# Patient Record
Sex: FEMALE | Race: WHITE | NOT HISPANIC OR LATINO | Employment: FULL TIME | URBAN - METROPOLITAN AREA
[De-identification: names, ages, dates, MRNs, and addresses within clinical notes are randomized per-mention and may not be internally consistent; named-entity substitution may affect disease eponyms.]

---

## 2017-01-03 DIAGNOSIS — N95.1 PERIMENOPAUSAL: ICD-10-CM

## 2017-01-03 DIAGNOSIS — B37.31 CANDIDIASIS OF VULVA AND VAGINA: ICD-10-CM

## 2017-01-03 DIAGNOSIS — N90.89 IRRITATION OF VULVA: ICD-10-CM

## 2017-01-04 RX ORDER — CONJUGATED ESTROGENS 0.62 MG/G
CREAM VAGINAL
Qty: 30 G | Refills: 0 | Status: SHIPPED | OUTPATIENT
Start: 2017-01-04 | End: 2017-08-06 | Stop reason: SDUPTHER

## 2017-01-06 ENCOUNTER — PATIENT MESSAGE (OUTPATIENT)
Dept: OBSTETRICS AND GYNECOLOGY | Facility: CLINIC | Age: 52
End: 2017-01-06

## 2017-01-06 DIAGNOSIS — Z00.00 PERIODIC HEALTH ASSESSMENT, GENERAL SCREENING, ADULT: Primary | ICD-10-CM

## 2017-01-16 ENCOUNTER — HOSPITAL ENCOUNTER (EMERGENCY)
Facility: OTHER | Age: 52
Discharge: HOME | End: 2017-01-16
Attending: EMERGENCY MEDICINE
Payer: COMMERCIAL

## 2017-01-16 ENCOUNTER — NURSE TRIAGE (OUTPATIENT)
Dept: ADMINISTRATIVE | Facility: CLINIC | Age: 52
End: 2017-01-16

## 2017-01-16 VITALS
RESPIRATION RATE: 17 BRPM | TEMPERATURE: 98 F | WEIGHT: 130 LBS | BODY MASS INDEX: 19.7 KG/M2 | SYSTOLIC BLOOD PRESSURE: 104 MMHG | HEIGHT: 68 IN | OXYGEN SATURATION: 99 % | DIASTOLIC BLOOD PRESSURE: 60 MMHG | HEART RATE: 68 BPM

## 2017-01-16 DIAGNOSIS — R10.9 ABDOMINAL PAIN, UNSPECIFIED LOCATION: Primary | ICD-10-CM

## 2017-01-16 LAB
ALBUMIN SERPL BCP-MCNC: 3.7 G/DL
ALP SERPL-CCNC: 62 U/L
ALT SERPL W/O P-5'-P-CCNC: 11 U/L
ANION GAP SERPL CALC-SCNC: 12 MMOL/L
AST SERPL-CCNC: 14 U/L
BACTERIA #/AREA URNS HPF: ABNORMAL /HPF
BASOPHILS # BLD AUTO: 0.01 K/UL
BASOPHILS NFR BLD: 0.1 %
BILIRUB SERPL-MCNC: 0.7 MG/DL
BILIRUB UR QL STRIP: NEGATIVE
BUN SERPL-MCNC: 9 MG/DL
CALCIUM SERPL-MCNC: 8.9 MG/DL
CHLORIDE SERPL-SCNC: 107 MMOL/L
CLARITY UR: CLEAR
CO2 SERPL-SCNC: 20 MMOL/L
COLOR UR: YELLOW
CREAT SERPL-MCNC: 0.7 MG/DL
DIFFERENTIAL METHOD: ABNORMAL
EOSINOPHIL # BLD AUTO: 0 K/UL
EOSINOPHIL NFR BLD: 0.1 %
ERYTHROCYTE [DISTWIDTH] IN BLOOD BY AUTOMATED COUNT: 12.8 %
EST. GFR  (AFRICAN AMERICAN): >60 ML/MIN/1.73 M^2
EST. GFR  (NON AFRICAN AMERICAN): >60 ML/MIN/1.73 M^2
GLUCOSE SERPL-MCNC: 119 MG/DL
GLUCOSE UR QL STRIP: NEGATIVE
HCT VFR BLD AUTO: 37.8 %
HGB BLD-MCNC: 13 G/DL
HGB UR QL STRIP: ABNORMAL
KETONES UR QL STRIP: ABNORMAL
LEUKOCYTE ESTERASE UR QL STRIP: NEGATIVE
LYMPHOCYTES # BLD AUTO: 0.8 K/UL
LYMPHOCYTES NFR BLD: 7.8 %
MCH RBC QN AUTO: 30.7 PG
MCHC RBC AUTO-ENTMCNC: 34.4 %
MCV RBC AUTO: 89 FL
MICROSCOPIC COMMENT: ABNORMAL
MONOCYTES # BLD AUTO: 0.5 K/UL
MONOCYTES NFR BLD: 4.3 %
NEUTROPHILS # BLD AUTO: 9.4 K/UL
NEUTROPHILS NFR BLD: 87.6 %
NITRITE UR QL STRIP: NEGATIVE
PH UR STRIP: 6 [PH] (ref 5–8)
PLATELET # BLD AUTO: 159 K/UL
PMV BLD AUTO: 9.6 FL
POTASSIUM SERPL-SCNC: 3.7 MMOL/L
PROT SERPL-MCNC: 7.1 G/DL
PROT UR QL STRIP: NEGATIVE
RBC # BLD AUTO: 4.24 M/UL
RBC #/AREA URNS HPF: 10 /HPF (ref 0–4)
SODIUM SERPL-SCNC: 139 MMOL/L
SP GR UR STRIP: >=1.03 (ref 1–1.03)
SQUAMOUS #/AREA URNS HPF: 8 /HPF
URN SPEC COLLECT METH UR: ABNORMAL
UROBILINOGEN UR STRIP-ACNC: NEGATIVE EU/DL
WBC # BLD AUTO: 10.76 K/UL
WBC #/AREA URNS HPF: 5 /HPF (ref 0–5)
WBC CLUMPS URNS QL MICRO: ABNORMAL

## 2017-01-16 PROCEDURE — 96361 HYDRATE IV INFUSION ADD-ON: CPT

## 2017-01-16 PROCEDURE — 87086 URINE CULTURE/COLONY COUNT: CPT

## 2017-01-16 PROCEDURE — 63600175 PHARM REV CODE 636 W HCPCS: Performed by: PHYSICIAN ASSISTANT

## 2017-01-16 PROCEDURE — 96374 THER/PROPH/DIAG INJ IV PUSH: CPT

## 2017-01-16 PROCEDURE — 25000003 PHARM REV CODE 250: Performed by: PHYSICIAN ASSISTANT

## 2017-01-16 PROCEDURE — 96375 TX/PRO/DX INJ NEW DRUG ADDON: CPT

## 2017-01-16 PROCEDURE — 99284 EMERGENCY DEPT VISIT MOD MDM: CPT | Mod: 25

## 2017-01-16 PROCEDURE — 81000 URINALYSIS NONAUTO W/SCOPE: CPT

## 2017-01-16 PROCEDURE — 93010 ELECTROCARDIOGRAM REPORT: CPT | Mod: ,,, | Performed by: INTERNAL MEDICINE

## 2017-01-16 PROCEDURE — 85025 COMPLETE CBC W/AUTO DIFF WBC: CPT

## 2017-01-16 PROCEDURE — 80053 COMPREHEN METABOLIC PANEL: CPT

## 2017-01-16 PROCEDURE — 93005 ELECTROCARDIOGRAM TRACING: CPT

## 2017-01-16 RX ORDER — ONDANSETRON 4 MG/1
4 TABLET, ORALLY DISINTEGRATING ORAL EVERY 8 HOURS PRN
Qty: 20 TABLET | Refills: 0
Start: 2017-01-16

## 2017-01-16 RX ORDER — FLUTICASONE PROPIONATE 50 MCG
2 SPRAY, SUSPENSION (ML) NASAL DAILY
COMMUNITY

## 2017-01-16 RX ORDER — ONDANSETRON 2 MG/ML
4 INJECTION INTRAMUSCULAR; INTRAVENOUS
Status: COMPLETED | OUTPATIENT
Start: 2017-01-16 | End: 2017-01-16

## 2017-01-16 RX ORDER — KETOROLAC TROMETHAMINE 30 MG/ML
10 INJECTION, SOLUTION INTRAMUSCULAR; INTRAVENOUS
Status: COMPLETED | OUTPATIENT
Start: 2017-01-16 | End: 2017-01-16

## 2017-01-16 RX ORDER — HYDROCODONE BITARTRATE AND ACETAMINOPHEN 5; 325 MG/1; MG/1
1 TABLET ORAL EVERY 6 HOURS PRN
Qty: 10 TABLET | Refills: 0
Start: 2017-01-16 | End: 2017-01-17 | Stop reason: SDUPTHER

## 2017-01-16 RX ORDER — HYDROCODONE BITARTRATE AND ACETAMINOPHEN 5; 325 MG/1; MG/1
1 TABLET ORAL EVERY 6 HOURS PRN
Qty: 10 TABLET | Refills: 0
Start: 2017-01-16 | End: 2017-01-16

## 2017-01-16 RX ADMIN — SODIUM CHLORIDE 1000 ML: 0.9 INJECTION, SOLUTION INTRAVENOUS at 03:01

## 2017-01-16 RX ADMIN — KETOROLAC TROMETHAMINE 10 MG: 30 INJECTION, SOLUTION INTRAMUSCULAR at 02:01

## 2017-01-16 RX ADMIN — ONDANSETRON 4 MG: 2 INJECTION, SOLUTION INTRAMUSCULAR; INTRAVENOUS at 02:01

## 2017-01-16 NOTE — ED AVS SNAPSHOT
[facility]  [address] [facility] [address]               Joy Grijalvabot   2017  1:38 PM   ED    Description:  Female : 1965   Department:  [facility]           Your Care was Coordinated By:     Provider Role From To    Zohaib Prado MD Attending Provider [phone] --    Jessica Mack PA-C Physician Assistant [phone] --      Reason for Visit     Abdominal Pain           Diagnoses this Visit        Comments    Abdominal pain, unspecified location    -  Primary       ED Disposition     None           To Do List           Follow-up Information     Follow up with Hortencia Nettles MD In 2 days.    Specialty:  Internal Medicine    Contact information:    [address]  [facility] [id number] [phone]          Follow up with [facility].    Specialty:  Emergency Medicine    Why:  If symptoms worsen    Contact information:    [address]  [facility] [address] [phone]       These Medications        Disp Refills Start End    ondansetron (ZOFRAN-ODT) 4 MG TbDL 20 tablet 0 2017     Take 1 tablet (4 mg total) by mouth every 8 (eight) hours as needed (nausea). - Oral    Pharmacy: [facility] - [place], [place] - [address] Ph #: [phone]       hydrocodone-acetaminophen 5-325mg (NORCO) 5-325 mg per tablet 10 tablet 0 2017    Take 1 tablet by mouth every 6 (six) hours as needed for Pain. - Oral    Pharmacy: [facility] - [place], [place] - [address] Ph #: [phone]         [facility]     [facility] -  Assistance  Registered nurses in the [facility] provide clinical advisement, health education, appointment booking, and other advisory services.  Call for this free service at [phone].             Medications           Message  regarding Medications     Verify the changes and/or additions to your medication regime listed below are the same as discussed with your clinician today.  If any of these changes or additions are incorrect, please notify your healthcare provider.        START taking these NEW medications        Refills    ondansetron (ZOFRAN-ODT) 4 MG TbDL 0    Sig: Take 1 tablet (4 mg total) by mouth every 8 (eight) hours as needed (nausea).    Class: No Print    Route: Oral    hydrocodone-acetaminophen 5-325mg (NORCO) 5-325 mg per tablet 0    Sig: Take 1 tablet by mouth every 6 (six) hours as needed for Pain.    Class: No Print    Route: Oral      These medications were administered today        Dose Freq    sodium chloride 0.9% bolus 1,000 mL 1,000 mL ED 1 Time    Sig: Inject 1,000 mLs into the vein ED 1 Time.    Class: Normal    Route: Intravenous    ondansetron injection 4 mg 4 mg ED 1 Time    Sig: Inject 4 mg into the vein ED 1 Time.    Class: Normal    Route: Intravenous    ketorolac injection 10 mg 10 mg ED 1 Time    Sig: Inject 10 mg into the vein ED 1 Time.    Class: Normal    Route: Intravenous      STOP taking these medications     fluconazole (DIFLUCAN) 150 MG Tab            Verify that the below list of medications is an accurate representation of the medications you are currently taking.  If none reported, the list may be blank. If incorrect, please contact your healthcare provider. Carry this list with you in case of emergency.           Current Medications     Bifidobacterium infantis (ALIGN) 4 mg Cap Take 1 capsule by mouth 3 (three) times a week.    escitalopram oxalate (LEXAPRO) 10 MG tablet TAKE 1 TABLET(10 MG) BY MOUTH EVERY DAY    estrogen, conjugated,-medroxyprogesterone 0.625-2.5mg (PREMPRO) 0.625-2.5 mg per tablet Take 1 tablet by mouth once daily.    fluticasone (FLONASE) 50 mcg/actuation nasal spray 2 sprays by Each Nare route once daily.    gabapentin (NEURONTIN) 300 MG capsule TAKE ONE CAPSULE BY MOUTH  "TWICE DAILY AS NEEDED    PREMARIN vaginal cream APPLY 1 GRAM( 1/4 APPLICATOR) VAGINALLY ON MONDAYS AND THURSDAYS    acyclovir (ZOVIRAX) 400 MG tablet Take 1 tablet (400 mg total) by mouth daily as needed.    alprazolam (XANAX) 0.5 MG tablet Take 1 tablet (0.5 mg total) by mouth 3 (three) times daily.    hydrocodone-acetaminophen 5-325mg (NORCO) 5-325 mg per tablet Take 1 tablet by mouth every 6 (six) hours as needed for Pain.    metoprolol tartrate (LOPRESSOR) 25 MG tablet Take 1 tablet (25 mg total) by mouth daily as needed.    ondansetron (ZOFRAN-ODT) 4 MG TbDL Take 1 tablet (4 mg total) by mouth every 8 (eight) hours as needed (nausea).           Clinical Reference Information           Your Vitals Were     BP Pulse Temp Resp Height Weight    116/66 66 98 °F (36.7 °C) (Oral) 15 5' 8" (1.727 m) 59 kg (130 lb)    SpO2 BMI             100% 19.77 kg/m2         Allergies as of 1/16/2017        Reactions    Remeron [Mirtazapine] Nausea And Vomiting    Iodinated Contrast Media - Iv Dye Hives    Monistat 1 (Tioconazole) [Tioconazole] Other (See Comments)    Burning    Clindamycin Rash      Immunizations Administered on Date of Encounter - 1/16/2017     None      ED Micro, Lab, POCT     Start Ordered       Status Ordering Provider    01/16/17 1438 01/16/17 1437  CBC auto differential  STAT      Final result     01/16/17 1438 01/16/17 1437  Comprehensive metabolic panel  STAT      Final result     01/16/17 1339 01/16/17 1338  Urinalysis  STAT      Final result     01/16/17 1338 01/16/17 1338  Urinalysis Microscopic  Once      Final result       ED Imaging Orders     Start Ordered       Status Ordering Provider    01/16/17 1441 01/16/17 1440  CT Renal Stone Study ABD Pelvis WO  1 time imaging      Final result     01/16/17 1438 01/16/17 1437    1 time imaging,   Status:  Canceled      Canceled         Discharge Instructions         Abdominal Pain, Possible Appendicitis, Repeat Exam (Female)    Based on your visit today, the " exact cause of your abdominal (stomach) pain is not certain. You have some of the early symptoms of appendicitis. Because these symptoms can be similar to other stomach problems, however, the diagnosis cannot be made at this time.  Waiting for more time to pass and repeating the exam is the best way to find out whether you have appendicitis. Within the next 12 to 24 hours, the cause of your stomach pain should become clear. During this time, you need to watch for any new symptoms or worsening of your condition. (See below.)  Symptoms  The most common symptom of appendicitis is pain in the abdomen. Since the appendix is in the lower right part of the abdomen, that is the most common place to have pain. Typically, the pain starts near the navel (belly button) and then moves lower and to the right over hours. The pain also tends to worsen over time. It may hurt especially when moving, straining, or coughing.  Other symptoms include:  · Loss of appetite  · Nausea, vomiting  · Low-grade fever  · Constipation or diarrhea  · Not passing gas  Home care  · Rest until your next exam. No lifting, straining, or strenuous activities.  · You may be told not to eat or drink anything before your next exam. Be sure to follow any instructions youre given. If you are allowed to eat:  ¨ Eat a diet low in fiber (called a low-residue diet). Foods allowed include refined breads, white rice, fruit and vegetable juices without pulp, and tender meats. These foods will pass more easily through the colon.  ¨ Avoid whole-grain foods, whole fruits and vegetables, meats, seeds and nuts, fried or fatty foods, dairy, and alcohol and spicy foods.   Follow-up care  Return for your next exam as directed.  When to seek medical advice  Call your healthcare provider or seek treatment right away if:  · You have a fever of 100.4°F (38°C) or higher, or as directed by your provider.  · Your pain gets worse or moves to the lower right part of your  abdomen.  · You have nausea or vomiting that worsens.  · You have diarrhea or constipation that worsens.  · You have blood in your vomit or stool (dark red or black color).  · Your abdomen becomes swollen.  · You become weak or dizzy.  · You think you might be pregnant or have unexpected vaginal bleeding.  Call 911  Call 911 right away if:  · You have trouble breathing.  · You become very confused or sleepy.  · You feel faint or lose consciousness.  · You have an usually fast heart rate.  © 0515-0425 [employer]. [address] [place] [address]. All rights reserved. This information is not intended as a substitute for professional medical care. Always follow your healthcare professional's instructions.          Your Scheduled Appointments     Jan 19, 2017  9:10 AM CST   Mammo Screening with [facility] MAMMO2 SCREEN   [facility] ([facility] )    [address] [place] [id number] [phone]               [facility] complies with applicable Federal civil rights laws and does not discriminate on the basis of race, color, national origin, age, disability, or sex.        Language Assistance Services     ATTENTION: Language assistance services are available, free of charge. Please call [phone].      ATENCIÓN: Si tyrellla jim, tiene a fermin disposición servicios gratuitos de asistencia lingüística. Llame al [phone].     [facility] Ý: N?u b?n nói Ti?ng Vi?t, có các d?ch v? h? tr? ngôn ng? mi?n phí dành cho b?n. G?i s? [phone].

## 2017-01-16 NOTE — DISCHARGE INSTRUCTIONS
Abdominal Pain, Possible Appendicitis, Repeat Exam (Female)    Based on your visit today, the exact cause of your abdominal (stomach) pain is not certain. You have some of the early symptoms of appendicitis. Because these symptoms can be similar to other stomach problems, however, the diagnosis cannot be made at this time.  Waiting for more time to pass and repeating the exam is the best way to find out whether you have appendicitis. Within the next 12 to 24 hours, the cause of your stomach pain should become clear. During this time, you need to watch for any new symptoms or worsening of your condition. (See below.)  Symptoms  The most common symptom of appendicitis is pain in the abdomen. Since the appendix is in the lower right part of the abdomen, that is the most common place to have pain. Typically, the pain starts near the navel (belly button) and then moves lower and to the right over hours. The pain also tends to worsen over time. It may hurt especially when moving, straining, or coughing.  Other symptoms include:  · Loss of appetite  · Nausea, vomiting  · Low-grade fever  · Constipation or diarrhea  · Not passing gas  Home care  · Rest until your next exam. No lifting, straining, or strenuous activities.  · You may be told not to eat or drink anything before your next exam. Be sure to follow any instructions youre given. If you are allowed to eat:  ¨ Eat a diet low in fiber (called a low-residue diet). Foods allowed include refined breads, white rice, fruit and vegetable juices without pulp, and tender meats. These foods will pass more easily through the colon.  ¨ Avoid whole-grain foods, whole fruits and vegetables, meats, seeds and nuts, fried or fatty foods, dairy, and alcohol and spicy foods.   Follow-up care  Return for your next exam as directed.  When to seek medical advice  Call your healthcare provider or seek treatment right away if:  · You have a fever of 100.4°F (38°C) or higher, or as  directed by your provider.  · Your pain gets worse or moves to the lower right part of your abdomen.  · You have nausea or vomiting that worsens.  · You have diarrhea or constipation that worsens.  · You have blood in your vomit or stool (dark red or black color).  · Your abdomen becomes swollen.  · You become weak or dizzy.  · You think you might be pregnant or have unexpected vaginal bleeding.  Call 911  Call 911 right away if:  · You have trouble breathing.  · You become very confused or sleepy.  · You feel faint or lose consciousness.  · You have an usually fast heart rate.  © 9383-7859 [employer]. [address] [place] [address], [place] [address]. All rights reserved. This information is not intended as a substitute for professional medical care. Always follow your healthcare professional's instructions.

## 2017-01-16 NOTE — ED PROVIDER NOTES
Encounter Date: 1/16/2017       History     Chief Complaint   Patient presents with    Abdominal Pain     Pt c/o generalized abdominal pain since this AM with nausea. Denies CP, SOB, or diarrhea.      Review of patient's allergies indicates:   Allergen Reactions    Remeron [mirtazapine] Nausea And Vomiting    Iodinated contrast media - iv dye Hives    Monistat 1 (tioconazole) [tioconazole] Other (See Comments)     Burning    Clindamycin Rash     HPI Comments: Patient is a 51-year-old female history of SVT who presents to the emergency department with abdominal pain.  Patient states she woke up this morning with diffuse abdominal cramping.  Patient states the pain didn't localize in the right lower quadrant.  Patient states the pain is worse with movement and walking.  Patient reports nausea with no vomiting.  Patient denies chest pain or shortness of breath.  Patient denies diarrhea.  Patient denies urinary symptoms.  Patient states she did start her period today.    The history is provided by the patient.     Past Medical History   Diagnosis Date    Anxiety     H/O head injury     Palpitations     Recurrent sinusitis 1/21/2014    Supraventricular tachycardia      Past Medical History Pertinent Negatives   Diagnosis Date Noted    Abnormal Pap smear of cervix 8/18/2016    Asthma 4/1/2016    COPD (chronic obstructive pulmonary disease) 4/1/2016    Diabetes mellitus 4/1/2016    Hypertension 4/1/2016     Past Surgical History   Procedure Laterality Date    Bone marrow surgery      Kidney stone surgery      Ablation  10/2/2014    Colonoscopy N/A 3/7/2016     Procedure: COLONOSCOPY;  Surgeon: Abelardo Mari MD;  Location: [address]);  Service: Endoscopy;  Laterality: N/A;     Family History   Problem Relation Age of Onset    Hypertension Mother     Prostate cancer Father     Breast cancer Cousin      maternal first cousin    Breast cancer Maternal Grandmother      dx in age early 50's     Stroke Maternal Grandfather     Colon cancer Neg Hx     Ovarian cancer Neg Hx     Diabetes Neg Hx      Social History   Substance Use Topics    Smoking status: Never Smoker    Smokeless tobacco: Never Used    Alcohol use Yes      Comment: socially     Review of Systems   Constitutional: Negative for activity change, appetite change, chills, fatigue and fever.   HENT: Negative for congestion, ear discharge, ear pain, hearing loss, mouth sores, sore throat and trouble swallowing.    Respiratory: Negative for cough and shortness of breath.    Cardiovascular: Negative for chest pain.   Gastrointestinal: Positive for abdominal pain and nausea. Negative for blood in stool, constipation, diarrhea and vomiting.   Genitourinary: Negative for dysuria, flank pain and hematuria.   Musculoskeletal: Negative for back pain, neck pain and neck stiffness.   Skin: Negative for rash and wound.   Neurological: Negative for dizziness, seizures, syncope, speech difficulty, weakness, light-headedness and headaches.       Physical Exam   Initial Vitals   BP Pulse Resp Temp SpO2   01/16/17 1336 01/16/17 1336 01/16/17 1336 01/16/17 1336 01/16/17 1336   88/51 63 18 98 °F (36.7 °C) 99 %     Physical Exam    Nursing note and vitals reviewed.  Constitutional: She appears well-developed and well-nourished. She is not diaphoretic.  Non-toxic appearance. No distress.   HENT:   Head: Normocephalic.   Right Ear: Hearing and external ear normal.   Left Ear: Hearing and external ear normal.   Nose: Nose normal.   Mouth/Throat: Uvula is midline and oropharynx is clear and moist. No trismus in the jaw. No uvula swelling. No oropharyngeal exudate.   Eyes: Conjunctivae are normal. Pupils are equal, round, and reactive to light.   Neck: Normal range of motion.   Cardiovascular: Normal rate, regular rhythm and normal heart sounds. Exam reveals no gallop and no friction rub.    No murmur heard.  Pulmonary/Chest: Breath sounds normal. No respiratory  distress. She has no wheezes. She has no rhonchi. She has no rales. She exhibits no tenderness.   Abdominal: Soft. Bowel sounds are normal. She exhibits no distension and no mass. There is tenderness. There is guarding and tenderness at McBurney's point. There is no rebound, no CVA tenderness and negative De Leon's sign.   Lymphadenopathy:     She has no cervical adenopathy.   Neurological: She is alert and oriented to person, place, and time.   Skin: Skin is warm and dry.   Psychiatric: She has a normal mood and affect.         ED Course   Procedures  Labs Reviewed   URINALYSIS - Abnormal; Notable for the following:        Result Value    Specific Gravity, UA >=1.030 (*)     Ketones, UA Trace (*)     Occult Blood UA 2+ (*)     All other components within normal limits   URINALYSIS MICROSCOPIC - Abnormal; Notable for the following:     RBC, UA 10 (*)     Bacteria, UA Moderate (*)     All other components within normal limits   CBC W/ AUTO DIFFERENTIAL - Abnormal; Notable for the following:     Gran # 9.4 (*)     Lymph # 0.8 (*)     Gran% 87.6 (*)     Lymph% 7.8 (*)     All other components within normal limits   COMPREHENSIVE METABOLIC PANEL - Abnormal; Notable for the following:     CO2 20 (*)     Glucose 119 (*)     All other components within normal limits              Imaging Results         CT Renal Stone Study ABD Pelvis WO (Final result) Result time:  01/16/17 15:11:09    Final result by Hugh Katz MD (01/16/17 15:11:09)    Impression:        No nephrolithiasis or other acute findings to explain pain.      Electronically signed by: HUGH KATZ MD  Date:     01/16/17  Time:    15:11     Narrative:    Procedure comments: 5-mm axial images through the abdomen and pelvis were obtained without the use of contrast material per the renal stone study protocol.  Coronal, axial, and sagittal reformatted images were reviewed.    Comparison: The    Findings:    The lung bases are clear without evidence of  pleural fluid.    The kidneys are normal in size and position.  There is no evidence of nephrolithiasis.  The ureters are normal in course to the urinary bladder which itself is normal.  There are no stones in the collecting system or hydronephrosis.    Imaged portions of the liver and spleen demonstrate homogenous attenuation.   The bile ducts, adrenal glands, and pancreas demonstrate no abnormality.  The stomach, small bowel, and colon are unremarkable without evidence of obstruction or inflammation.  There is no ascites or free air.    The aorta and venous structures of the abdomen demonstrate no abnormality.  There is no lymph node enlargement.  The osseous structures are unremarkable.              Medical Decision Making:   Initial Assessment:   Urgent evaluation of a 51-year-old female with history of SVT who presents to the emergency department with abdominal pain.  Patient is afebrile, nontoxic appearing, and hemodynamically stable.  On exam, there is positive McBurney's point.  Patient's pain started diffusely in the abdomen.  Patient does have decreased appetite with nausea.  My differential diagnosis includes but is not limited to gastritis, constipation, appendicitis, UTI.  Labs will be obtained.  Patient is ALLERGIC to contrast.  Abdominal/pelvis CT no contrast will be ordered.  Pt will be given antiemetic, fluids, and analgesic.  Clinical Tests:   Lab Tests: Ordered and Reviewed  Radiological Study: Ordered and Reviewed  ED Management:  4:39 PM  No leukocytosis.  No kidney insufficiency or electrolyte disturbance.  U/A does show red blood cells with moderate bacteria, but patient just recently started her period.  I do not feel that this is urinary tract infection.  Culture will be sent.  CT shows no nephrolithiasis or acute findings to explain pain.  On reevaluation, patient is  in the right lower quadrant.  Her symptoms have improved some.  I gave patient the option of being admitted for  serial abdominal exams.  Patient states she would rather go home and will return with any worsening symptoms.  Patient is given return precautions.  Other:   I have discussed this case with another health care provider.       <> Summary of the Discussion: Dr. Hancock                   ED Course     Clinical Impression:   The encounter diagnosis was Abdominal pain, unspecified location.          Jessica Mack PA-C  01/16/17 [id number]

## 2017-01-16 NOTE — ED NOTES
"Pt presents to ED c/o generalized abdominal pain since 0500 rating pain 5/10. Pt also reporting recent fatigue and "has been sleeping a lot, I slept for 15 hours yesterday." Denies vomiting, SOB, or fever. Reports dizziness and nausea upon standing. Pt AAOx4 and appropriate at this time. Respirations even and unlabored. No acute distress noted. Awaiting further orders. Pt updated on POC. Bed is locked and in lowest position with side rails up x2. Call bell within reach and pt oriented to use of call bell. Pt on continuous cardiac monitoring, continuous pulse ox, and continuous BP cuff. Will continue to monitor.     "

## 2017-01-16 NOTE — ED NOTES
Rounding on the pt has been done. Pt reports pain 0/10, no other complaints at this time. .Pt AAOx4 and appropriate at this time. Respirations even and unlabored. No acute distress noted. Awaiting further orders. Pt updated on POC. Bed is locked and in lowest position with side rails up x2. Call bell within reach and pt oriented to use of call bell. Pt on continuous cardiac monitoring, continuous pulse ox, and continuous BP cuff. Will continue to monitor.

## 2017-01-18 ENCOUNTER — OFFICE VISIT (OUTPATIENT)
Dept: GASTROENTEROLOGY | Facility: CLINIC | Age: 52
End: 2017-01-18
Payer: COMMERCIAL

## 2017-01-18 ENCOUNTER — HOSPITAL ENCOUNTER (OUTPATIENT)
Dept: RADIOLOGY | Facility: HOSPITAL | Age: 52
Discharge: HOME | End: 2017-01-18
Attending: INTERNAL MEDICINE
Payer: COMMERCIAL

## 2017-01-18 VITALS
WEIGHT: 141.13 LBS | TEMPERATURE: 98 F | HEIGHT: 68 IN | SYSTOLIC BLOOD PRESSURE: 115 MMHG | DIASTOLIC BLOOD PRESSURE: 70 MMHG | HEART RATE: 70 BPM | BODY MASS INDEX: 21.39 KG/M2

## 2017-01-18 DIAGNOSIS — R10.814 LEFT LOWER QUADRANT ABDOMINAL TENDERNESS WITHOUT REBOUND TENDERNESS: ICD-10-CM

## 2017-01-18 DIAGNOSIS — R10.813 RIGHT LOWER QUADRANT ABDOMINAL TENDERNESS WITHOUT REBOUND TENDERNESS: Primary | ICD-10-CM

## 2017-01-18 DIAGNOSIS — R14.0 ABDOMINAL BLOATING: ICD-10-CM

## 2017-01-18 DIAGNOSIS — R10.84 GENERALIZED ABDOMINAL PAIN: ICD-10-CM

## 2017-01-18 LAB — BACTERIA UR CULT: NO GROWTH

## 2017-01-18 PROCEDURE — 1159F MED LIST DOCD IN RCRD: CPT | Mod: S$GLB,,, | Performed by: PHYSICIAN ASSISTANT

## 2017-01-18 PROCEDURE — 99213 OFFICE O/P EST LOW 20 MIN: CPT | Mod: S$GLB,,, | Performed by: PHYSICIAN ASSISTANT

## 2017-01-18 PROCEDURE — 74020 XR ABDOMEN FLAT AND ERECT: CPT | Mod: 26,,, | Performed by: RADIOLOGY

## 2017-01-18 PROCEDURE — 74020 XR ABDOMEN FLAT AND ERECT: CPT | Mod: TC

## 2017-01-18 PROCEDURE — 99999 PR PBB SHADOW E&M-EST. PATIENT-LVL IV: CPT | Mod: PBBFAC,,, | Performed by: PHYSICIAN ASSISTANT

## 2017-01-18 NOTE — PATIENT INSTRUCTIONS
Take miralax or sennakot daily for 1 week to help your bowels move    Consider changing to culturelle

## 2017-01-18 NOTE — PROGRESS NOTES
[facility] Consultation Note    Reason for Consult:  The primary encounter diagnosis was Right lower quadrant abdominal tenderness without rebound tenderness. Diagnoses of Left lower quadrant abdominal tenderness without rebound tenderness and Abdominal bloating were also pertinent to this visit.    PCP:   Hortencia Nettles       Referring MD:  No referring provider defined for this encounter.    HPI:  This is a 51 y.o. female here for evaluation of abdominal pain. Seen in the ED 1/16/17 for RLQ pain and nausea, dizziness,  Increased pain with bumps riding in the car. A CT abd/pelvis was normal, performed without contrast since she is allergic to contrast dye. Serial abdominal x-rays were offered to her, however she elected to be discharged. Normal WBC, HGb, lfts.    Today she is feeling 70-80% better. No further abdominal pain. Still having RLQ abdominal tenderness, bloating, poor appetite. Constipation since Monday. Her son does have a stomach virus so she is not sure if she has a similar viral illness.    Hx of IBS-D  Colon 3/2016 - normal    ROS:  Constitutional: No fevers, chills, No weight loss, + poor appetite  ENT: No allergies  CV: No chest pain  Pulm: No cough, No shortness of breath  Ophtho: No vision changes  GI: see HPI  Derm: No rash  Heme: No lymphadenopathy, No bruising  MSK: No arthritis  : No dysuria, No hematuria  Endo: No hot or cold intolerance  Neuro: No syncope, No seizure  Psych: No anxiety, No depression    Medical History:  has a past medical history of Anxiety; H/O head injury; Palpitations; Recurrent sinusitis (1/21/2014); and Supraventricular tachycardia.    Surgical History:  has a past surgical history that includes bone marrow surgery; Kidney stone surgery; Ablation (10/2/2014); and Colonoscopy (N/A, 3/7/2016).    Family History: family history includes Breast cancer in her cousin and maternal grandmother; Hypertension in her mother; Prostate cancer in her  father; Stroke in her maternal grandfather. There is no history of Colon cancer, Ovarian cancer, or Diabetes..     Social History:  reports that she has never smoked. She has never used smokeless tobacco. She reports that she drinks alcohol. She reports that she does not use illicit drugs.    Review of patient's allergies indicates:   Allergen Reactions    Remeron [mirtazapine] Nausea And Vomiting    Iodinated contrast media - iv dye Hives    Monistat 1 (tioconazole) [tioconazole] Other (See Comments)     Burning    Clindamycin Rash       Current Outpatient Prescriptions on File Prior to Visit   Medication Sig Dispense Refill    Bifidobacterium infantis (ALIGN) 4 mg Cap Take 1 capsule by mouth 3 (three) times a week.      estrogen, conjugated,-medroxyprogesterone 0.625-2.5mg (PREMPRO) 0.625-2.5 mg per tablet Take 1 tablet by mouth once daily. 30 tablet 11    gabapentin (NEURONTIN) 300 MG capsule TAKE ONE CAPSULE BY MOUTH TWICE DAILY AS NEEDED 180 capsule 0    PREMARIN vaginal cream APPLY 1 GRAM( 1/4 APPLICATOR) VAGINALLY ON MONDAYS AND THURSDAYS 30 g 0    acyclovir (ZOVIRAX) 400 MG tablet Take 1 tablet (400 mg total) by mouth daily as needed. 30 tablet 2    alprazolam (XANAX) 0.5 MG tablet Take 1 tablet (0.5 mg total) by mouth 3 (three) times daily. 90 tablet 0    escitalopram oxalate (LEXAPRO) 10 MG tablet TAKE 1 TABLET(10 MG) BY MOUTH EVERY DAY 90 tablet 3    fluticasone (FLONASE) 50 mcg/actuation nasal spray 2 sprays by Each Nare route once daily.      metoprolol tartrate (LOPRESSOR) 25 MG tablet Take 1 tablet (25 mg total) by mouth daily as needed. 30 tablet 6    ondansetron (ZOFRAN-ODT) 4 MG TbDL Take 1 tablet (4 mg total) by mouth every 8 (eight) hours as needed (nausea). 20 tablet 0    [DISCONTINUED] ketoconazole (NIZORAL) 2 % shampoo Apply topically once daily. 1 mL 0     No current facility-administered medications on file prior to visit.          Objective Findings:    Vital Signs:  Visit  "Vitals    /70    Pulse 70    Temp 98.3 °F (36.8 °C)    Ht 5' 8" (1.727 m)    Wt 64 kg (141 lb 1.5 oz)    LMP 01/16/2017    BMI 21.45 kg/m2     Body mass index is 21.45 kg/(m^2).    Physical Exam:  General Appearance: Well appearing in no acute distress  Head:   Normocephalic, without obvious abnormality  Eyes:    No scleral icterus  ENT: Neck supple, Lips, mucosa, and tongue normal  Lungs: CTA bilaterally in anterior and posterior fields, no wheezes, no crackles.  Heart:  Regular rate and rhythm, S1, S2 normal, no murmurs heard  Abdomen: Soft, RLQ and LLQ tender to palpation, no guarding or rebound tenderness, non distended with positive bowel sounds in all four quadrants.   Extremities: 2+ pulses, no edema  Skin: No rash  Neurologic: AAO x 3      Labs:  Lab Results   Component Value Date    WBC 10.76 01/16/2017    HGB 13.0 01/16/2017    HCT 37.8 01/16/2017     01/16/2017    CHOL 188 10/11/2012    TRIG 189 (H) 10/11/2012    HDL 44 10/11/2012    ALT 11 01/16/2017    AST 14 01/16/2017     01/16/2017    K 3.7 01/16/2017     01/16/2017    CREATININE 0.7 01/16/2017    BUN 9 01/16/2017    CO2 20 (L) 01/16/2017    TSH 2.353 04/01/2016    INR 1.0 09/29/2014       Imaging:  CT abd/pel without contract - no acute    Endoscopy:    Colon 3/2016 - normal    Assessment:  1. Right lower quadrant abdominal tenderness without rebound tenderness    2. Left lower quadrant abdominal tenderness without rebound tenderness    3. Abdominal bloating    hx IBS    Severe RLQ abdominal pain that started 2 days ago. Concern for appendicitis and CT scan performed without contrast due to contrast dye allergy could not completely rule out appendicitis. Labs did not support appendicitis and Pt has been afebrile for the past 3 days.     Now 70% better, no abdominal pain, but does still have tenderness and bloating    Recommendations:  1. Abdominal x-ray to rule out free air, obstruction, and evaluate stool " burden.    2. miralax or sennakot daily for 1 week, in case her abdominal tenderness and bloating is related to constipation    3. Start taking culturelle    If sx persist consider a gallbladder etiology      No Follow-up on file.      Order summary:  Orders Placed This Encounter    X-Ray Abdomen Flat And Erect         Thank you so much for allowing me to participate in the care of Joy Muhammad PA-C

## 2017-01-18 NOTE — MR AVS SNAPSHOT
[facility] - Gastroenterology  [address]  [facility] [address]  Phone: [phone]  Fax: [phone]                  Joy Veliz   2017 10:30 AM   Office Visit    Description:  Female : 1965   Provider:  Jenny Muhammad PA-C   Department:  [facility] - Gastroenterology           Reason for Visit     Abdominal Pain           Diagnoses this Visit        Comments    Generalized abdominal pain    -  Primary            To Do List           Future Appointments        Provider Department Dept Phone    2017 11:45 AM [facility] [id number] LB LIMIT [facility] [phone]    2017 9:10 AM [facility] MAMMO2 SCREEN [facility] [phone]      Goals (5 Years of Data)     None      [facility]     [facility] Care [facility] -  Assistance  Registered nurses in the [facility] provide clinical advisement, health education, appointment booking, and other advisory services.  Call for this free service at [phone].             Medications           Message regarding Medications     Verify the changes and/or additions to your medication regime listed below are the same as discussed with your clinician today.  If any of these changes or additions are incorrect, please notify your healthcare provider.             Verify that the below list of medications is an accurate representation of the medications you are currently taking.  If none reported, the list may be blank. If incorrect, please contact your healthcare provider. Carry this list with you in case of emergency.           Current Medications     Bifidobacterium infantis (ALIGN) 4 mg Cap Take 1 capsule by mouth 3 (three) times a week.    estrogen, conjugated,-medroxyprogesterone 0.625-2.5mg (PREMPRO) 0.625-2.5 mg per tablet Take 1 tablet by mouth once daily.    gabapentin (NEURONTIN) 300 MG capsule TAKE ONE CAPSULE BY MOUTH TWICE DAILY AS NEEDED    PREMARIN vaginal cream APPLY 1  "GRAM( 1/4 APPLICATOR) VAGINALLY ON MONDAYS AND THURSDAYS    acyclovir (ZOVIRAX) 400 MG tablet Take 1 tablet (400 mg total) by mouth daily as needed.    alprazolam (XANAX) 0.5 MG tablet Take 1 tablet (0.5 mg total) by mouth 3 (three) times daily.    escitalopram oxalate (LEXAPRO) 10 MG tablet TAKE 1 TABLET(10 MG) BY MOUTH EVERY DAY    fluticasone (FLONASE) 50 mcg/actuation nasal spray 2 sprays by Each Nare route once daily.    metoprolol tartrate (LOPRESSOR) 25 MG tablet Take 1 tablet (25 mg total) by mouth daily as needed.    ondansetron (ZOFRAN-ODT) 4 MG TbDL Take 1 tablet (4 mg total) by mouth every 8 (eight) hours as needed (nausea).           Clinical Reference Information           Vital Signs - Last Recorded  Most recent update: 1/18/2017 10:54 AM by Swati Valdez MA    BP Pulse Ht Wt LMP BMI    115/70 70 5' 8" (1.727 m) 64 kg (141 lb 1.5 oz) 01/16/2017 21.45 kg/m2      Blood Pressure          Most Recent Value    BP  115/70      Allergies as of 1/18/2017     Remeron [Mirtazapine]    Iodinated Contrast Media - Iv Dye    Monistat 1 (Tioconazole) [Tioconazole]    Clindamycin      Immunizations Administered on Date of Encounter - 1/18/2017     None      Orders Placed During Today's Visit     Future Labs/Procedures Expected by Expires    X-Ray Abdomen Flat And Erect  1/18/2017 1/18/2018      Instructions    Take miralax or sennakot daily for 1 week to help your bowels move    Consider changing to culturelle       "

## 2017-01-19 ENCOUNTER — PATIENT MESSAGE (OUTPATIENT)
Dept: GASTROENTEROLOGY | Facility: CLINIC | Age: 52
End: 2017-01-19

## 2017-01-28 ENCOUNTER — NURSE TRIAGE (OUTPATIENT)
Dept: ADMINISTRATIVE | Facility: CLINIC | Age: 52
End: 2017-01-28

## 2017-01-28 NOTE — TELEPHONE ENCOUNTER
Reason for Disposition   Caller requesting an appointment, triage offered and declined    Protocols used:  PCP CALL - NO TRIAGE-A-    Patient states that she wants urgent care appointment tomorrow because she is having sinus congestion. appt made.

## 2017-01-29 ENCOUNTER — OFFICE VISIT (OUTPATIENT)
Dept: INTERNAL MEDICINE | Facility: CLINIC | Age: 52
End: 2017-01-29
Payer: COMMERCIAL

## 2017-01-29 VITALS
SYSTOLIC BLOOD PRESSURE: 100 MMHG | HEART RATE: 87 BPM | OXYGEN SATURATION: 99 % | HEIGHT: 68 IN | WEIGHT: 138 LBS | TEMPERATURE: 98 F | BODY MASS INDEX: 20.92 KG/M2 | DIASTOLIC BLOOD PRESSURE: 70 MMHG

## 2017-01-29 DIAGNOSIS — J01.41 ACUTE RECURRENT PANSINUSITIS: Primary | ICD-10-CM

## 2017-01-29 PROCEDURE — 99213 OFFICE O/P EST LOW 20 MIN: CPT | Mod: S$GLB,,, | Performed by: NURSE PRACTITIONER

## 2017-01-29 PROCEDURE — 1159F MED LIST DOCD IN RCRD: CPT | Mod: S$GLB,,, | Performed by: NURSE PRACTITIONER

## 2017-01-29 PROCEDURE — 99999 PR PBB SHADOW E&M-EST. PATIENT-LVL IV: CPT | Mod: PBBFAC,,, | Performed by: NURSE PRACTITIONER

## 2017-01-29 RX ORDER — AMOXICILLIN AND CLAVULANATE POTASSIUM 875; 125 MG/1; MG/1
1 TABLET, FILM COATED ORAL EVERY 12 HOURS
Qty: 14 TABLET | Refills: 0 | Status: SHIPPED | OUTPATIENT
Start: 2017-01-29

## 2017-01-29 RX ORDER — BENZONATATE 200 MG/1
200 CAPSULE ORAL NIGHTLY
Qty: 30 CAPSULE | Refills: 0 | Status: SHIPPED | OUTPATIENT
Start: 2017-01-29

## 2017-01-29 NOTE — PATIENT INSTRUCTIONS
Flonase 1 spray to both nares twice a day or 2 sprays once a day    Sinus wash with neti pot, using sterile water only.    Ibuprofen 400mg every 6h for 3-4 days with food.    Sinusitis (Antibiotic Treatment)    The sinuses are air-filled spaces within the bones of the face. They connect to the inside of the nose. Sinusitis is an inflammation of the tissue lining the sinus cavity. Sinus inflammation can occur during a cold. It can also be due to allergies to pollens and other particles in the air. Sinusitis can cause symptoms of sinus congestion and fullness. A sinus infection causes fever, headache and facial pain. There is often green or yellow drainage from the nose or into the back of the throat (post-nasal drip). You have been given antibiotics to treat this condition.  Home care:  · Take the full course of antibiotics as instructed. Do not stop taking them, even if you feel better.  · Drink plenty of water, hot tea, and other liquids. This may help thin mucus. It also may promote sinus drainage.  · Heat may help soothe painful areas of the face. Use a towel soaked in hot water. Or,  the shower and direct the hot spray onto your face. Using a vaporizer along with a menthol rub at night may also help.   · An expectorant containing guaifenesin may help thin the mucus and promote drainage from the sinuses.  · Over-the-counter decongestants may be used unless a similar medicine was prescribed. Nasal sprays work the fastest. Use one that contains phenylephrine or oxymetazoline. First blow the nose gently. Then use the spray. Do not use these medicines more often than directed on the label or symptoms may get worse. You may also use tablets containing pseudoephedrine. Avoid products that combine ingredients, because side effects may be increased. Read labels. You can also ask the pharmacist for help. (NOTE: Persons with high blood pressure should not use decongestants. They can raise blood  pressure.)  · Over-the-counter antihistamines may help if allergies contributed to your sinusitis.    · Do not use nasal rinses or irrigation during an acute sinus infection, unless told to by your health care provider. Rinsing may spread the infection to other sinuses.  · Use acetaminophen or ibuprofen to control pain, unless another pain medicine was prescribed. (If you have chronic liver or kidney disease or ever had a stomach ulcer, talk with your doctor before using these medicines. Aspirin should never be used in anyone under 18 years of age who is ill with a fever. It may cause severe liver damage.)  · Don't smoke. This can worsen symptoms.  Follow-up care  Follow up with your healthcare provider or our staff if you are not improving within the next week.  When to seek medical advice  Call your healthcare provider if any of these occur:  · Facial pain or headache becoming more severe  · Stiff neck  · Unusual drowsiness or confusion  · Swelling of the forehead or eyelids  · Vision problems, including blurred or double vision  · Fever of 100.4ºF (38ºC) or higher, or as directed by your healthcare provider  · Seizure  · Breathing problems  · Symptoms not resolving within 10 days  © 6840-2010 [employer]. [address] [place] [address], [place] [address]. All rights reserved. This information is not intended as a substitute for professional medical care. Always follow your healthcare professional's instructions.

## 2017-01-29 NOTE — MR AVS SNAPSHOT
[facility] - Internal Medicine  [address]  [place] [address]  Phone: [phone]  Fax: [phone]                  Joy Veliz   2017 2:00 PM   Office Visit    Description:  Female : 1965   Provider:  Gregg Blas DNP   Department:  [facility]           Reason for Visit     Nasal Congestion           Diagnoses this Visit        Comments    Acute recurrent pansinusitis    -  Primary            To Do List           Future Appointments        Provider Department Dept Phone    2017 9:00 AM [facility] M[facility]2 SCREEN [facility] [phone]      Goals (5 Years of Data)     None       These Medications        Disp Refills Start End    amoxicillin-clavulanate 875-125mg (AUGMENTIN) 875-125 mg per tablet 14 tablet 0 2017     Take 1 tablet by mouth every 12 (twelve) hours. - Oral    Pharmacy: [facility] [id number] [place] [address] AT [facility] Ph #: [phone]       benzonatate (TESSALON) 200 MG capsule 30 capsule 0 2017     Take 1 capsule (200 mg total) by mouth every evening. - Oral    Pharmacy: [facility] [id number] [place] [address] AT [facility] Ph #: [phone]         [facility] On [facility]     [facility] -  Assistance  Registered nurses in the [facility] provide clinical advisement, health education, appointment booking, and other advisory services.  Call for this free service at [phone].             Medications           Message regarding Medications     Verify the changes and/or additions to your medication regime listed below are the same as discussed with your clinician today.  If any of these changes or additions are incorrect, please notify your healthcare provider.        START taking these NEW medications        Refills    amoxicillin-clavulanate 875-125mg (AUGMENTIN) 875-125 mg per tablet 0    Sig:  "Take 1 tablet by mouth every 12 (twelve) hours.    Class: Print    Route: Oral    benzonatate (TESSALON) 200 MG capsule 0    Sig: Take 1 capsule (200 mg total) by mouth every evening.    Class: Normal    Route: Oral           Verify that the below list of medications is an accurate representation of the medications you are currently taking.  If none reported, the list may be blank. If incorrect, please contact your healthcare provider. Carry this list with you in case of emergency.           Current Medications     acyclovir (ZOVIRAX) 400 MG tablet Take 1 tablet (400 mg total) by mouth daily as needed.    alprazolam (XANAX) 0.5 MG tablet Take 1 tablet (0.5 mg total) by mouth 3 (three) times daily.    Bifidobacterium infantis (ALIGN) 4 mg Cap Take 1 capsule by mouth 3 (three) times a week.    escitalopram oxalate (LEXAPRO) 10 MG tablet TAKE 1 TABLET(10 MG) BY MOUTH EVERY DAY    estrogen, conjugated,-medroxyprogesterone 0.625-2.5mg (PREMPRO) 0.625-2.5 mg per tablet Take 1 tablet by mouth once daily.    fluticasone (FLONASE) 50 mcg/actuation nasal spray 2 sprays by Each Nare route once daily.    gabapentin (NEURONTIN) 300 MG capsule TAKE ONE CAPSULE BY MOUTH TWICE DAILY AS NEEDED    metoprolol tartrate (LOPRESSOR) 25 MG tablet Take 1 tablet (25 mg total) by mouth daily as needed.    ondansetron (ZOFRAN-ODT) 4 MG TbDL Take 1 tablet (4 mg total) by mouth every 8 (eight) hours as needed (nausea).    PREMARIN vaginal cream APPLY 1 GRAM( 1/4 APPLICATOR) VAGINALLY ON MONDAYS AND THURSDAYS    amoxicillin-clavulanate 875-125mg (AUGMENTIN) 875-125 mg per tablet Take 1 tablet by mouth every 12 (twelve) hours.    benzonatate (TESSALON) 200 MG capsule Take 1 capsule (200 mg total) by mouth every evening.           Clinical Reference Information           Vital Signs - Last Recorded  Most recent update: 1/29/2017  2:05 PM by Mayuri Mccall MA    BP Pulse Temp Ht Wt LMP    100/70 87 97.7 °F (36.5 °C) 5' 8" (1.727 m) 62.6 kg " (138 lb) 01/16/2017    SpO2 BMI             99% 20.98 kg/m2         Blood Pressure          Most Recent Value    BP  100/70      Allergies as of 1/29/2017     Remeron [Mirtazapine]    Iodinated Contrast Media - Iv Dye    Monistat 1 (Tioconazole) [Tioconazole]    Clindamycin      Immunizations Administered on Date of Encounter - 1/29/2017     None      Instructions    Flonase 1 spray to both nares twice a day or 2 sprays once a day    Sinus wash with neti pot, using sterile water only.    Ibuprofen 400mg every 6h for 3-4 days with food.    Sinusitis (Antibiotic Treatment)    The sinuses are air-filled spaces within the bones of the face. They connect to the inside of the nose. Sinusitis is an inflammation of the tissue lining the sinus cavity. Sinus inflammation can occur during a cold. It can also be due to allergies to pollens and other particles in the air. Sinusitis can cause symptoms of sinus congestion and fullness. A sinus infection causes fever, headache and facial pain. There is often green or yellow drainage from the nose or into the back of the throat (post-nasal drip). You have been given antibiotics to treat this condition.  Home care:  · Take the full course of antibiotics as instructed. Do not stop taking them, even if you feel better.  · Drink plenty of water, hot tea, and other liquids. This may help thin mucus. It also may promote sinus drainage.  · Heat may help soothe painful areas of the face. Use a towel soaked in hot water. Or,  the shower and direct the hot spray onto your face. Using a vaporizer along with a menthol rub at night may also help.   · An expectorant containing guaifenesin may help thin the mucus and promote drainage from the sinuses.  · Over-the-counter decongestants may be used unless a similar medicine was prescribed. Nasal sprays work the fastest. Use one that contains phenylephrine or oxymetazoline. First blow the nose gently. Then use the spray. Do not use these  medicines more often than directed on the label or symptoms may get worse. You may also use tablets containing pseudoephedrine. Avoid products that combine ingredients, because side effects may be increased. Read labels. You can also ask the pharmacist for help. (NOTE: Persons with high blood pressure should not use decongestants. They can raise blood pressure.)  · Over-the-counter antihistamines may help if allergies contributed to your sinusitis.    · Do not use nasal rinses or irrigation during an acute sinus infection, unless told to by your health care provider. Rinsing may spread the infection to other sinuses.  · Use acetaminophen or ibuprofen to control pain, unless another pain medicine was prescribed. (If you have chronic liver or kidney disease or ever had a stomach ulcer, talk with your doctor before using these medicines. Aspirin should never be used in anyone under 18 years of age who is ill with a fever. It may cause severe liver damage.)  · Don't smoke. This can worsen symptoms.  Follow-up care  Follow up with your healthcare provider or our staff if you are not improving within the next week.  When to seek medical advice  Call your healthcare provider if any of these occur:  · Facial pain or headache becoming more severe  · Stiff neck  · Unusual drowsiness or confusion  · Swelling of the forehead or eyelids  · Vision problems, including blurred or double vision  · Fever of 100.4ºF (38ºC) or higher, or as directed by your healthcare provider  · Seizure  · Breathing problems  · Symptoms not resolving within 10 days  © 8533-7561 [employer]. [address] [place] [address], [place] [address]. All rights reserved. This information is not intended as a substitute for professional medical care. Always follow your healthcare professional's instructions.

## 2017-01-29 NOTE — PROGRESS NOTES
Subjective:       Patient ID: Joy Veliz is a 51 y.o. female.    Chief Complaint: Nasal Congestion    Sinus Problem   This is a new problem. The current episode started in the past 7 days (4d). There has been no fever. Pain scale: 10 is excruciating, 7 is acceptable. She is experiencing no pain. Associated symptoms include chills, congestion, coughing (green phlegm), ear pain (blockage), sinus pressure, sneezing and a sore throat. Pertinent negatives include no diaphoresis, headaches or shortness of breath. Treatments tried: motrin, flonase.     Review of Systems   Constitutional: Positive for activity change (increased rest for sleep) and chills. Negative for appetite change, diaphoresis and fever.   HENT: Positive for congestion, ear pain (blockage), rhinorrhea, sinus pressure, sneezing, sore throat and voice change (hoarse). Negative for ear discharge, nosebleeds, postnasal drip and trouble swallowing.    Eyes: Negative for photophobia, discharge, redness, itching and visual disturbance.   Respiratory: Positive for cough (green phlegm) and chest tightness (rib pain with cough). Negative for shortness of breath.    Cardiovascular: Negative for chest pain and leg swelling.   Gastrointestinal: Negative for abdominal distention, abdominal pain, blood in stool, constipation, diarrhea, nausea and vomiting.   Genitourinary: Negative for dysuria, frequency, hematuria, urgency and vaginal discharge.   Musculoskeletal: Negative for arthralgias, back pain and myalgias.   Skin: Negative for rash and wound.   Neurological: Negative for dizziness, syncope and headaches.   Hematological: Negative for adenopathy.   Psychiatric/Behavioral: Negative for suicidal ideas.   All other systems reviewed and are negative.      Objective:      Physical Exam   Constitutional: She is oriented to person, place, and time. Vital signs are normal. She appears well-developed and well-nourished. She is cooperative.  Non-toxic appearance. She does  not have a sickly appearance. She does not appear ill. No distress.   HENT:   Head: Normocephalic and atraumatic.   Right Ear: Hearing, tympanic membrane, external ear and ear canal normal.   Left Ear: Hearing, tympanic membrane, external ear and ear canal normal.   Nose: Nose normal.   Mouth/Throat: Uvula is midline, oropharynx is clear and moist and mucous membranes are normal.   Eyes: Conjunctivae and EOM are normal. Pupils are equal, round, and reactive to light. Right eye exhibits no discharge. Left eye exhibits no discharge.   Neck: Normal range of motion. Neck supple.   Cardiovascular: Normal rate, regular rhythm, S1 normal, S2 normal, normal heart sounds and intact distal pulses.  Exam reveals no gallop, no S3, no S4 and no friction rub.    No murmur heard.  Pulmonary/Chest: Effort normal and breath sounds normal. No respiratory distress. She has no decreased breath sounds. She has no wheezes. She has no rhonchi. She has no rales. She exhibits no tenderness.   Abdominal: Soft. Bowel sounds are normal. She exhibits no distension and no mass. There is no guarding.   Musculoskeletal: Normal range of motion. She exhibits no edema or tenderness.   Neurological: She is alert and oriented to person, place, and time. She has normal reflexes. She displays normal reflexes. No cranial nerve deficit. She exhibits normal muscle tone. Coordination normal.   Skin: Skin is warm and dry.   Psychiatric: She has a normal mood and affect. Her behavior is normal. Judgment and thought content normal.       Assessment:       1. Acute recurrent pansinusitis        Plan:       Joy was seen today for nasal congestion.    Diagnoses and all orders for this visit:    Acute recurrent pansinusitis  -     amoxicillin-clavulanate 875-125mg (AUGMENTIN) 875-125 mg per tablet; Take 1 tablet by mouth every 12 (twelve) hours.  -     benzonatate (TESSALON) 200 MG capsule; Take 1 capsule (200 mg total) by mouth every evening.    Recommended  "flonase nasal spray, pt verbalized instructions to look down, spray, then gently sniff  Recommended use of neti-pot with sterile water only  Recommended ibuprofen otc      Pt has been given instructions populated from [employer] database and has verbalized understanding of the after visit summary and information contained wherein.    Follow up with a primary care provider. May go to ER for acute shortness of breath, lightheadedness, fever, or any other emergent complaints or changes in condition.    "This note will be shared with the patient"    The [employer] medical Dictation software was used during the dictation of portions or the entirety of this medical record.  Phonetic or grammatic errors may exist due to the use of this software. For clarification, refer to the author of the document.             "

## 2017-02-01 RX ORDER — GABAPENTIN 300 MG/1
CAPSULE ORAL
Qty: 180 CAPSULE | Refills: 0 | OUTPATIENT
Start: 2017-02-01

## 2017-02-02 ENCOUNTER — PATIENT MESSAGE (OUTPATIENT)
Dept: PSYCHIATRY | Facility: CLINIC | Age: 52
End: 2017-02-02

## 2017-02-02 RX ORDER — GABAPENTIN 300 MG/1
CAPSULE ORAL
Qty: 180 CAPSULE | Refills: 0 | Status: SHIPPED | OUTPATIENT
Start: 2017-02-02 | End: 2017-04-04 | Stop reason: SDUPTHER

## 2017-02-03 RX ORDER — ACYCLOVIR 400 MG/1
TABLET ORAL
Qty: 30 TABLET | Refills: 0 | Status: SHIPPED | OUTPATIENT
Start: 2017-02-03

## 2017-02-22 ENCOUNTER — PATIENT MESSAGE (OUTPATIENT)
Dept: INTERNAL MEDICINE | Facility: CLINIC | Age: 52
End: 2017-02-22

## 2017-03-23 ENCOUNTER — HOSPITAL ENCOUNTER (OUTPATIENT)
Dept: RADIOLOGY | Facility: HOSPITAL | Age: 52
Discharge: HOME | End: 2017-03-23
Attending: OBSTETRICS & GYNECOLOGY
Payer: COMMERCIAL

## 2017-03-23 DIAGNOSIS — Z12.31 VISIT FOR SCREENING MAMMOGRAM: ICD-10-CM

## 2017-03-23 DIAGNOSIS — Z00.00 PERIODIC HEALTH ASSESSMENT, GENERAL SCREENING, ADULT: ICD-10-CM

## 2017-03-23 PROCEDURE — 77063 BREAST TOMOSYNTHESIS BI: CPT | Mod: 26,,, | Performed by: RADIOLOGY

## 2017-03-23 PROCEDURE — 77067 SCR MAMMO BI INCL CAD: CPT | Mod: TC

## 2017-03-23 PROCEDURE — 77067 SCR MAMMO BI INCL CAD: CPT | Mod: 26,,, | Performed by: RADIOLOGY

## 2017-04-04 ENCOUNTER — OFFICE VISIT (OUTPATIENT)
Dept: PSYCHIATRY | Facility: CLINIC | Age: 52
End: 2017-04-04
Payer: COMMERCIAL

## 2017-04-04 DIAGNOSIS — F41.9 ANXIETY: Primary | ICD-10-CM

## 2017-04-04 PROCEDURE — 99214 OFFICE O/P EST MOD 30 MIN: CPT | Mod: S$GLB,,, | Performed by: PSYCHIATRY & NEUROLOGY

## 2017-04-04 PROCEDURE — 1160F RVW MEDS BY RX/DR IN RCRD: CPT | Mod: S$GLB,,, | Performed by: PSYCHIATRY & NEUROLOGY

## 2017-04-04 RX ORDER — ESCITALOPRAM OXALATE 10 MG/1
TABLET ORAL
Qty: 90 TABLET | Refills: 3 | Status: SHIPPED | OUTPATIENT
Start: 2017-04-04 | End: 2017-08-07 | Stop reason: SDUPTHER

## 2017-04-04 RX ORDER — GABAPENTIN 300 MG/1
CAPSULE ORAL
Qty: 180 CAPSULE | Refills: 3 | Status: SHIPPED | OUTPATIENT
Start: 2017-04-04 | End: 2017-08-07 | Stop reason: SDUPTHER

## 2017-04-04 NOTE — PROGRESS NOTES
ESTABLISHED OUTPATIENT VISIT   E/M LEVEL 4: [id number]    ENCOUNTER DATE: 4/4/2017  SITE: [facility] [address]    HISTORY    CHIEF COMPLAINT   Joy Veliz is a 52 y.o. female who presents for follow up of anxiety.    HPI   Doing well psychiatrically.    Plans to move to [place] soon, possibly in June[this is a promotion for the patient]. Son is doing well.    Psychiatric Review Of Systems - Is patient experiencing or having changes in:  sleep: sleeping varies  appetite: no  weight: no  energy/anergy: no  interest/pleasure/anhedonia: no  somatic symptoms: no  libido: no  anxiety/panic: no  guilty/hopelessness: no  concentration: no  S.I.B.s/risky behavior: no  Irritability: no  Racing thoughts: no  Impulsive behaviors: no  Paranoia:no  AVH:no    Recent alcohol: no  Recent drug: no    Medical ROS   No current physical complaint.     PAST MEDICAL, FAMILY AND SOCIAL HISTORY: The patient's past medical, family and social history have been reviewed and updated as appropriate within the electronic medical record - see encounter notes.    PSYCHOTROPIC MEDICATIONS   Neurontin 300 mg at bedtime[at times takes 600 mg], Lexapro 10 mg qam    EXAMINATION    There were no vitals filed for this visit.  Pt. To have vitals and weight done after today's visit    CONSTITUTIONAL  General Appearance: well nourished    MUSCULOSKELETAL  Muscle Strength and Tone: normal strength and tone  Abnormal Involuntary Movements: no abnormal movement noted  Gait and Station: normal gait    PSYCHIATRIC   Level of Consciousness: alert  Orientation: oriented to person, place and time  Grooming: well groomed  Psychomotor Behavior: no restlessness/agitation  Speech: normal in rate, rhythm and volume  Language: normal vocabulary  Mood: stable  Affect: full range and appropriate  Thought Process: logical and goal directed  Associations: intact associations  Thought Content: no SI/HI  Memory: grossly intact  Attention: intact to content of  interview  Fund of Knowledge: appears adequate  Insight: good  Judgement: good    MEDICAL DECISION MAKING    DIAGNOSES  Anxiety d/o N.O.S.    PROBLEM LIST AND MANAGEMENT PLANS    - Anxiety: continue Neurontin and Lexapro as above  - f/u with psychiatrist in [place]    Time with patient: 20 min    LABORATORY DATA  Admission on 01/16/2017, Discharged on 01/16/2017   Component Date Value Ref Range Status    Specimen UA 01/16/2017 Urine, Clean Catch   Final    Color, UA 01/16/2017 Yellow  Yellow, Straw, Zeny Final    Appearance, UA 01/16/2017 Clear  Clear Final    pH, UA 01/16/2017 6.0  5.0 - 8.0 Final    Specific Gravity, UA 01/16/2017 >=1.030* 1.005 - 1.030 Final    Protein, UA 01/16/2017 Negative  Negative Final    Comment: Recommend a 24 hour urine protein or a urine   protein/creatinine ratio if globulin induced proteinuria is  clinically suspected.      Glucose, UA 01/16/2017 Negative  Negative Final    Ketones, UA 01/16/2017 Trace* Negative Final    Bilirubin (UA) 01/16/2017 Negative  Negative Final    Occult Blood UA 01/16/2017 2+* Negative Final    Nitrite, UA 01/16/2017 Negative  Negative Final    Urobilinogen, UA 01/16/2017 Negative  <2.0 EU/dL Final    Leukocytes, UA 01/16/2017 Negative  Negative Final    RBC, UA 01/16/2017 10* 0 - 4 /hpf Final    WBC, UA 01/16/2017 5  0 - 5 /hpf Final    WBC Clumps, UA 01/16/2017 Rare  None-Rare Final    Bacteria, UA 01/16/2017 Moderate* None-Occ /hpf Final    Squam Epithel, UA 01/16/2017 8  /hpf Final    Microscopic Comment 01/16/2017 SEE COMMENT   Final    Comment: Other formed elements not mentioned in the report are not   present in the microscopic examination.       WBC 01/16/2017 10.76  3.90 - 12.70 K/uL Final    RBC 01/16/2017 4.24  4.00 - 5.40 M/uL Final    Hemoglobin 01/16/2017 13.0  12.0 - 16.0 g/dL Final    Hematocrit 01/16/2017 37.8  37.0 - 48.5 % Final    MCV 01/16/2017 89  82 - 98 fL Final    MCH 01/16/2017 30.7  27.0 - 31.0 pg Final     MCHC 01/16/2017 34.4  32.0 - 36.0 % Final    RDW 01/16/2017 12.8  11.5 - 14.5 % Final    Platelets 01/16/2017 159  150 - 350 K/uL Final    MPV 01/16/2017 9.6  9.2 - 12.9 fL Final    Gran # 01/16/2017 9.4* 1.8 - 7.7 K/uL Final    Lymph # 01/16/2017 0.8* 1.0 - 4.8 K/uL Final    Mono # 01/16/2017 0.5  0.3 - 1.0 K/uL Final    Eos # 01/16/2017 0.0  0.0 - 0.5 K/uL Final    Baso # 01/16/2017 0.01  0.00 - 0.20 K/uL Final    Gran% 01/16/2017 87.6* 38.0 - 73.0 % Final    Lymph% 01/16/2017 7.8* 18.0 - 48.0 % Final    Mono% 01/16/2017 4.3  4.0 - 15.0 % Final    Eosinophil% 01/16/2017 0.1  0.0 - 8.0 % Final    Basophil% 01/16/2017 0.1  0.0 - 1.9 % Final    Differential Method 01/16/2017 Automated   Final    Sodium 01/16/2017 139  136 - 145 mmol/L Final    Potassium 01/16/2017 3.7  3.5 - 5.1 mmol/L Final    Chloride 01/16/2017 107  95 - 110 mmol/L Final    CO2 01/16/2017 20* 23 - 29 mmol/L Final    Glucose 01/16/2017 119* 70 - 110 mg/dL Final    BUN, Bld 01/16/2017 9  6 - 20 mg/dL Final    Creatinine 01/16/2017 0.7  0.5 - 1.4 mg/dL Final    Calcium 01/16/2017 8.9  8.7 - 10.5 mg/dL Final    Total Protein 01/16/2017 7.1  6.0 - 8.4 g/dL Final    Albumin 01/16/2017 3.7  3.5 - 5.2 g/dL Final    Total Bilirubin 01/16/2017 0.7  0.1 - 1.0 mg/dL Final    Comment: For infants and newborns, interpretation of results should be based  on gestational age, weight and in agreement with clinical  observations.  Premature Infant recommended reference ranges:  Up to 24 hours.............<8.0 mg/dL  Up to 48 hours............<12.0 mg/dL  3-5 days..................<15.0 mg/dL  6-29 days.................<15.0 mg/dL      Alkaline Phosphatase 01/16/2017 62  55 - 135 U/L Final    AST 01/16/2017 14  10 - 40 U/L Final    ALT 01/16/2017 11  10 - 44 U/L Final    Anion Gap 01/16/2017 12  8 - 16 mmol/L Final    eGFR if African American 01/16/2017 >60  >60 mL/min/1.73 m^2 Final    eGFR if non African American 01/16/2017 >60   >60 mL/min/1.73 m^2 Final    Comment: Calculation used to obtain the estimated glomerular filtration  rate (eGFR) is the CKD-EPI equation. Since race is unknown   in our information system, the eGFR values for   -American and Non--American patients are given   for each creatinine result.      Urine Culture, Routine 01/16/2017 No growth   Final           Alfonso Wood

## 2017-04-04 NOTE — MR AVS SNAPSHOT
[facility]  [address]  [facility] [address]  Phone: [phone]  Fax: [phone]                  Joy Veliz   2017 3:00 PM   Office Visit    Description:  Female : 1965   Provider:  Alfonso Wood MD   Department:  [facility]           Diagnoses this Visit        Comments    Anxiety    -  Primary            To Do List           Goals (5 Years of Data)     None       These Medications        Disp Refills Start End    escitalopram oxalate (LEXAPRO) 10 MG tablet 90 tablet 3 2017     TAKE 1 TABLET(10 MG) BY MOUTH EVERY DAY    Pharmacy: [facility] [address] [place] [address] [facility] Ph #: [phone]       Notes to Pharmacy: **Patient requests 90 days supply**    gabapentin (NEURONTIN) 300 MG capsule 180 capsule 3 2017     Take one or two capsules at bedtime as needed    Pharmacy: [facility] [address] [place] [address] [address] AT [facility] Ph #: [phone]         [facility]     [facility] Nurse Care [facility] -  Assistance  Unless otherwise directed by your provider, please contact [facility], our nurse care line that is available for  assistance.     Registered nurses in the [facility] provide: appointment scheduling, clinical advisement, health education, and other advisory services.  Call: [phone] (toll free)               Medications           Message regarding Medications     Verify the changes and/or additions to your medication regime listed below are the same as discussed with your clinician today.  If any of these changes or additions are incorrect, please notify your healthcare provider.             Verify that the below list of medications is an accurate representation of the medications you are currently taking.  If none reported, the list may be blank. If incorrect, please contact your healthcare provider. Carry this  list with you in case of emergency.           Current Medications     acyclovir (ZOVIRAX) 400 MG tablet Take 1 tablet (400 mg total) by mouth daily as needed.    acyclovir (ZOVIRAX) 400 MG tablet TAKE 1 TABLET BY MOUTH DAILY AS NEEDED    alprazolam (XANAX) 0.5 MG tablet Take 1 tablet (0.5 mg total) by mouth 3 (three) times daily.    amoxicillin-clavulanate 875-125mg (AUGMENTIN) 875-125 mg per tablet Take 1 tablet by mouth every 12 (twelve) hours.    benzonatate (TESSALON) 200 MG capsule Take 1 capsule (200 mg total) by mouth every evening.    Bifidobacterium infantis (ALIGN) 4 mg Cap Take 1 capsule by mouth 3 (three) times a week.    escitalopram oxalate (LEXAPRO) 10 MG tablet TAKE 1 TABLET(10 MG) BY MOUTH EVERY DAY    estrogen, conjugated,-medroxyprogesterone 0.625-2.5mg (PREMPRO) 0.625-2.5 mg per tablet Take 1 tablet by mouth once daily.    fluticasone (FLONASE) 50 mcg/actuation nasal spray 2 sprays by Each Nare route once daily.    gabapentin (NEURONTIN) 300 MG capsule Take one or two capsules at bedtime as needed    metoprolol tartrate (LOPRESSOR) 25 MG tablet Take 1 tablet (25 mg total) by mouth daily as needed.    ondansetron (ZOFRAN-ODT) 4 MG TbDL Take 1 tablet (4 mg total) by mouth every 8 (eight) hours as needed (nausea).    PREMARIN vaginal cream APPLY 1 GRAM( 1/4 APPLICATOR) VAGINALLY ON MONDAYS AND THURSDAYS           Clinical Reference Information           Allergies as of 4/4/2017     Remeron [Mirtazapine]    Iodinated Contrast Media - Iv Dye    Monistat 1 (Tioconazole) [Tioconazole]    Clindamycin      Immunizations Administered on Date of Encounter - 4/4/2017     None      Language Assistance Services     ATTENTION: Language assistance services are available, free of charge. Please call [phone].      ATENCIÓN: Si habla brigidpearl, tiene a fermin disposición servicios gratuitos de asistencia lingüística. Llame al [phone].     CHÚ Ý: N?u b?n nói Ti?ng Vi?t, có các d?ch v? h? tr? ngôn ng? mi?n  [facility] dành cho b?n. G?i s? [phone].         [facility] complies with applicable Federal civil rights laws and does not discriminate on the basis of race, color, national origin, age, disability, or sex.

## 2017-04-06 DIAGNOSIS — N95.1 MENOPAUSAL SYMPTOMS: ICD-10-CM

## 2017-04-06 RX ORDER — CONJUGATED ESTROGENS AND MEDROXYPROGESTERONE ACETATE .625; 2.5 MG/1; MG/1
TABLET, SUGAR COATED ORAL
Qty: 28 TABLET | Refills: 0 | Status: SHIPPED | OUTPATIENT
Start: 2017-04-06 | End: 2017-05-31 | Stop reason: SDUPTHER

## 2017-05-01 RX ORDER — ESCITALOPRAM OXALATE 10 MG/1
TABLET ORAL
Qty: 90 TABLET | Refills: 0 | Status: SHIPPED | OUTPATIENT
Start: 2017-05-01

## 2017-05-31 ENCOUNTER — PATIENT MESSAGE (OUTPATIENT)
Dept: OBSTETRICS AND GYNECOLOGY | Facility: CLINIC | Age: 52
End: 2017-05-31

## 2017-05-31 DIAGNOSIS — N95.1 MENOPAUSAL SYMPTOMS: ICD-10-CM

## 2017-05-31 NOTE — TELEPHONE ENCOUNTER
Called pt and informed her that her prescription is being sent to her pharmacy. Pt verbalized understanding.

## 2017-06-08 ENCOUNTER — TELEPHONE (OUTPATIENT)
Dept: OBSTETRICS AND GYNECOLOGY | Facility: CLINIC | Age: 52
End: 2017-06-08

## 2017-06-08 NOTE — TELEPHONE ENCOUNTER
Left message for pt to call office back. Need to inform her that her appt Wednesday will be at [facility] [place] now instead of

## 2017-06-29 RX ORDER — ACYCLOVIR 400 MG/1
TABLET ORAL
Qty: 30 TABLET | Refills: 0 | Status: SHIPPED | OUTPATIENT
Start: 2017-06-29

## 2017-07-31 ENCOUNTER — PATIENT MESSAGE (OUTPATIENT)
Dept: INTERNAL MEDICINE | Facility: CLINIC | Age: 52
End: 2017-07-31

## 2017-08-06 DIAGNOSIS — N95.1 PERIMENOPAUSAL: ICD-10-CM

## 2017-08-06 DIAGNOSIS — B37.31 CANDIDIASIS OF VULVA AND VAGINA: ICD-10-CM

## 2017-08-06 DIAGNOSIS — N90.89 IRRITATION OF VULVA: ICD-10-CM

## 2017-08-07 RX ORDER — CONJUGATED ESTROGENS 0.62 MG/G
CREAM VAGINAL
Qty: 30 G | Refills: 0 | Status: SHIPPED | OUTPATIENT
Start: 2017-08-07

## 2017-08-07 RX ORDER — GABAPENTIN 300 MG/1
CAPSULE ORAL
Qty: 180 CAPSULE | Refills: 0 | Status: SHIPPED | OUTPATIENT
Start: 2017-08-07

## 2017-08-07 RX ORDER — HYDROXYZINE HYDROCHLORIDE 10 MG/1
10 TABLET, FILM COATED ORAL NIGHTLY PRN
Qty: 30 TABLET | Refills: 2 | Status: SHIPPED | OUTPATIENT
Start: 2017-08-07

## 2017-08-07 RX ORDER — ESCITALOPRAM OXALATE 10 MG/1
TABLET ORAL
Qty: 90 TABLET | Refills: 0 | Status: SHIPPED | OUTPATIENT
Start: 2017-08-07

## 2017-08-07 RX ORDER — ESCITALOPRAM OXALATE 10 MG/1
TABLET ORAL
Qty: 90 TABLET | Refills: 0 | OUTPATIENT
Start: 2017-08-07

## 2017-08-28 ENCOUNTER — HOSPITAL ENCOUNTER (OUTPATIENT)
Dept: LAB | Age: 52
Discharge: HOME | End: 2017-08-28
Payer: COMMERCIAL

## 2017-08-28 PROCEDURE — 87624 HPV HI-RISK TYP POOLED RSLT: CPT | Performed by: OBSTETRICS & GYNECOLOGY

## 2017-08-28 PROCEDURE — 88175 CYTOPATH C/V AUTO FLUID REDO: CPT | Performed by: OBSTETRICS & GYNECOLOGY

## 2017-10-09 RX ORDER — FLUTICASONE PROPIONATE 50 MCG
SPRAY, SUSPENSION (ML) NASAL
Qty: 16 G | Refills: 2 | Status: SHIPPED | OUTPATIENT
Start: 2017-10-09 | End: 2018-06-17 | Stop reason: SDUPTHER

## 2017-10-09 RX ORDER — FLUTICASONE PROPIONATE 50 MCG
SPRAY, SUSPENSION (ML) NASAL
Qty: 16 G | Refills: 0 | Status: SHIPPED | OUTPATIENT
Start: 2017-10-09 | End: 2017-10-09 | Stop reason: SDUPTHER

## 2017-10-16 RX ORDER — ACYCLOVIR 400 MG/1
TABLET ORAL
Qty: 30 TABLET | Refills: 0 | OUTPATIENT
Start: 2017-10-16

## 2017-11-14 ENCOUNTER — ANESTHESIA EVENT (OUTPATIENT)
Dept: SURGERY | Age: 52
End: 2017-11-14
Payer: COMMERCIAL

## 2017-11-14 ENCOUNTER — HOSPITAL ENCOUNTER (OUTPATIENT)
Age: 52
Setting detail: OBSERVATION
Discharge: HOME | End: 2017-11-15
Attending: EMERGENCY MEDICINE | Admitting: SURGERY
Payer: COMMERCIAL

## 2017-11-14 ENCOUNTER — ANESTHESIA (OUTPATIENT)
Dept: SURGERY | Age: 52
End: 2017-11-14
Payer: COMMERCIAL

## 2017-11-14 ENCOUNTER — APPOINTMENT (OUTPATIENT)
Dept: CT IMAGING | Age: 52
End: 2017-11-14
Attending: PHYSICIAN ASSISTANT
Payer: COMMERCIAL

## 2017-11-14 DIAGNOSIS — K35.890 OTHER ACUTE APPENDICITIS: Primary | ICD-10-CM

## 2017-11-14 PROBLEM — K35.80 ACUTE APPENDICITIS: Status: ACTIVE | Noted: 2017-11-14

## 2017-11-14 LAB
ALBUMIN SERPL-MCNC: 3.7 G/DL (ref 3.4–5)
ALBUMIN/GLOB SERPL: 1.1 {RATIO} (ref 0.8–1.7)
ALP SERPL-CCNC: 81 U/L (ref 45–117)
ALT SERPL-CCNC: 19 U/L (ref 13–56)
ANION GAP SERPL CALC-SCNC: 5 MMOL/L (ref 3–18)
APPEARANCE UR: CLEAR
AST SERPL-CCNC: 13 U/L (ref 15–37)
ATRIAL RATE: 71 BPM
BACTERIA URNS QL MICRO: ABNORMAL /HPF
BASOPHILS # BLD: 0 K/UL (ref 0–0.06)
BASOPHILS NFR BLD: 0 % (ref 0–2)
BILIRUB SERPL-MCNC: 0.5 MG/DL (ref 0.2–1)
BILIRUB UR QL: NEGATIVE
BUN SERPL-MCNC: 9 MG/DL (ref 7–18)
BUN/CREAT SERPL: 12 (ref 12–20)
CALCIUM SERPL-MCNC: 8.7 MG/DL (ref 8.5–10.1)
CALCULATED P AXIS, ECG09: 62 DEGREES
CALCULATED R AXIS, ECG10: 88 DEGREES
CALCULATED T AXIS, ECG11: 77 DEGREES
CHLORIDE SERPL-SCNC: 105 MMOL/L (ref 100–108)
CK MB CFR SERPL CALC: NORMAL % (ref 0–4)
CK MB SERPL-MCNC: <1 NG/ML (ref 5–25)
CK SERPL-CCNC: 55 U/L (ref 26–192)
CO2 SERPL-SCNC: 28 MMOL/L (ref 21–32)
COLOR UR: YELLOW
CREAT SERPL-MCNC: 0.74 MG/DL (ref 0.6–1.3)
DIAGNOSIS, 93000: NORMAL
DIFFERENTIAL METHOD BLD: ABNORMAL
EOSINOPHIL # BLD: 0.1 K/UL (ref 0–0.4)
EOSINOPHIL NFR BLD: 1 % (ref 0–5)
EPITH CASTS URNS QL MICRO: ABNORMAL /LPF (ref 0–5)
ERYTHROCYTE [DISTWIDTH] IN BLOOD BY AUTOMATED COUNT: 12.3 % (ref 11.6–14.5)
GLOBULIN SER CALC-MCNC: 3.4 G/DL (ref 2–4)
GLUCOSE SERPL-MCNC: 106 MG/DL (ref 74–99)
GLUCOSE UR STRIP.AUTO-MCNC: NEGATIVE MG/DL
HCG UR QL: NEGATIVE
HCT VFR BLD AUTO: 38.7 % (ref 35–45)
HGB BLD-MCNC: 13.6 G/DL (ref 12–16)
HGB UR QL STRIP: ABNORMAL
KETONES UR QL STRIP.AUTO: ABNORMAL MG/DL
LEUKOCYTE ESTERASE UR QL STRIP.AUTO: ABNORMAL
LYMPHOCYTES # BLD: 1.5 K/UL (ref 0.9–3.6)
LYMPHOCYTES NFR BLD: 14 % (ref 21–52)
MCH RBC QN AUTO: 31.1 PG (ref 24–34)
MCHC RBC AUTO-ENTMCNC: 35.1 G/DL (ref 31–37)
MCV RBC AUTO: 88.6 FL (ref 74–97)
MONOCYTES # BLD: 0.7 K/UL (ref 0.05–1.2)
MONOCYTES NFR BLD: 6 % (ref 3–10)
MUCOUS THREADS URNS QL MICRO: ABNORMAL /LPF
NEUTS SEG # BLD: 8.6 K/UL (ref 1.8–8)
NEUTS SEG NFR BLD: 79 % (ref 40–73)
NITRITE UR QL STRIP.AUTO: NEGATIVE
P-R INTERVAL, ECG05: 144 MS
PH UR STRIP: 6 [PH] (ref 5–8)
PLATELET # BLD AUTO: 179 K/UL (ref 135–420)
PMV BLD AUTO: 9.2 FL (ref 9.2–11.8)
POTASSIUM SERPL-SCNC: 3.9 MMOL/L (ref 3.5–5.5)
PROT SERPL-MCNC: 7.1 G/DL (ref 6.4–8.2)
PROT UR STRIP-MCNC: NEGATIVE MG/DL
Q-T INTERVAL, ECG07: 398 MS
QRS DURATION, ECG06: 82 MS
QTC CALCULATION (BEZET), ECG08: 432 MS
RBC # BLD AUTO: 4.37 M/UL (ref 4.2–5.3)
RBC #/AREA URNS HPF: ABNORMAL /HPF (ref 0–5)
SODIUM SERPL-SCNC: 138 MMOL/L (ref 136–145)
SP GR UR REFRACTOMETRY: 1.02 (ref 1–1.03)
TROPONIN I SERPL-MCNC: <0.02 NG/ML (ref 0–0.04)
UROBILINOGEN UR QL STRIP.AUTO: 1 EU/DL (ref 0.2–1)
VENTRICULAR RATE, ECG03: 71 BPM
WBC # BLD AUTO: 10.9 K/UL (ref 4.6–13.2)
WBC URNS QL MICRO: ABNORMAL /HPF (ref 0–4)

## 2017-11-14 PROCEDURE — 74011250636 HC RX REV CODE- 250/636

## 2017-11-14 PROCEDURE — 99285 EMERGENCY DEPT VISIT HI MDM: CPT

## 2017-11-14 PROCEDURE — 77030021508 HC STPLR ENDO GIA COVD -C: Performed by: SURGERY

## 2017-11-14 PROCEDURE — 76210000017 HC OR PH I REC 1.5 TO 2 HR: Performed by: SURGERY

## 2017-11-14 PROCEDURE — 74176 CT ABD & PELVIS W/O CONTRAST: CPT

## 2017-11-14 PROCEDURE — 77030035029 HC NDL INSUF VERES DISP COVD -B: Performed by: SURGERY

## 2017-11-14 PROCEDURE — 74011250636 HC RX REV CODE- 250/636: Performed by: PHYSICIAN ASSISTANT

## 2017-11-14 PROCEDURE — 77030031139 HC SUT VCRL2 J&J -A: Performed by: SURGERY

## 2017-11-14 PROCEDURE — 81001 URINALYSIS AUTO W/SCOPE: CPT | Performed by: EMERGENCY MEDICINE

## 2017-11-14 PROCEDURE — 77030032490 HC SLV COMPR SCD KNE COVD -B: Performed by: SURGERY

## 2017-11-14 PROCEDURE — 77030008688: Performed by: NURSE ANESTHETIST, CERTIFIED REGISTERED

## 2017-11-14 PROCEDURE — 77030035048 HC TRCR ENDOSC OPTCL COVD -B: Performed by: SURGERY

## 2017-11-14 PROCEDURE — 81025 URINE PREGNANCY TEST: CPT | Performed by: EMERGENCY MEDICINE

## 2017-11-14 PROCEDURE — 74011000258 HC RX REV CODE- 258: Performed by: PHYSICIAN ASSISTANT

## 2017-11-14 PROCEDURE — 99218 HC RM OBSERVATION: CPT

## 2017-11-14 PROCEDURE — 77030009963 HC RELD STPLR ECR J&J -C: Performed by: SURGERY

## 2017-11-14 PROCEDURE — 74011000250 HC RX REV CODE- 250: Performed by: SURGERY

## 2017-11-14 PROCEDURE — 74011250636 HC RX REV CODE- 250/636: Performed by: SURGERY

## 2017-11-14 PROCEDURE — 85025 COMPLETE CBC W/AUTO DIFF WBC: CPT | Performed by: EMERGENCY MEDICINE

## 2017-11-14 PROCEDURE — 74011000272 HC RX REV CODE- 272: Performed by: SURGERY

## 2017-11-14 PROCEDURE — 77030034850: Performed by: SURGERY

## 2017-11-14 PROCEDURE — 77030008771 HC TU NG SALEM SUMP -A: Performed by: NURSE ANESTHETIST, CERTIFIED REGISTERED

## 2017-11-14 PROCEDURE — 77030022473 HC HNDL ENDO GIA UNIV USDA -C: Performed by: SURGERY

## 2017-11-14 PROCEDURE — 77030008603 HC TRCR ENDOSC EPATH J&J -C: Performed by: SURGERY

## 2017-11-14 PROCEDURE — 77030027876 HC STPLR ENDOSC FLX PWR J&J -G1: Performed by: SURGERY

## 2017-11-14 PROCEDURE — 74011250636 HC RX REV CODE- 250/636: Performed by: ANESTHESIOLOGY

## 2017-11-14 PROCEDURE — 77030005515 HC CATH URETH FOL14 BARD -B: Performed by: SURGERY

## 2017-11-14 PROCEDURE — 77030008477 HC STYL SATN SLP COVD -A: Performed by: NURSE ANESTHETIST, CERTIFIED REGISTERED

## 2017-11-14 PROCEDURE — 80053 COMPREHEN METABOLIC PANEL: CPT | Performed by: EMERGENCY MEDICINE

## 2017-11-14 PROCEDURE — 77030016151 HC PROTCTR LNS DFOG COVD -B: Performed by: SURGERY

## 2017-11-14 PROCEDURE — 88304 TISSUE EXAM BY PATHOLOGIST: CPT | Performed by: SURGERY

## 2017-11-14 PROCEDURE — 77030027491 HC SHR ENDOSC COVD -B: Performed by: SURGERY

## 2017-11-14 PROCEDURE — 76010000161 HC OR TIME 1 TO 1.5 HR INTENSV-TIER 1: Performed by: SURGERY

## 2017-11-14 PROCEDURE — 77030018834: Performed by: SURGERY

## 2017-11-14 PROCEDURE — 77030010507 HC ADH SKN DERMBND J&J -B: Performed by: SURGERY

## 2017-11-14 PROCEDURE — 96361 HYDRATE IV INFUSION ADD-ON: CPT

## 2017-11-14 PROCEDURE — 74011000258 HC RX REV CODE- 258: Performed by: SURGERY

## 2017-11-14 PROCEDURE — 77030035051: Performed by: SURGERY

## 2017-11-14 PROCEDURE — 77030009852 HC PCH RTVR ENDOSC COVD -B: Performed by: SURGERY

## 2017-11-14 PROCEDURE — 77030009973 HC RELD STPLR J&J -C: Performed by: SURGERY

## 2017-11-14 PROCEDURE — 74011000250 HC RX REV CODE- 250

## 2017-11-14 PROCEDURE — 82550 ASSAY OF CK (CPK): CPT | Performed by: PHYSICIAN ASSISTANT

## 2017-11-14 PROCEDURE — 77030011640 HC PAD GRND REM COVD -A: Performed by: SURGERY

## 2017-11-14 PROCEDURE — 77030018525 HC STPLR ENDOSC J&J -G1: Performed by: SURGERY

## 2017-11-14 PROCEDURE — 96365 THER/PROPH/DIAG IV INF INIT: CPT

## 2017-11-14 PROCEDURE — 77030035045 HC TRCR ENDOSC VRSPRT BLDLSS COVD -B: Performed by: SURGERY

## 2017-11-14 PROCEDURE — 76060000033 HC ANESTHESIA 1 TO 1.5 HR: Performed by: SURGERY

## 2017-11-14 PROCEDURE — 93005 ELECTROCARDIOGRAM TRACING: CPT

## 2017-11-14 PROCEDURE — 77030002933 HC SUT MCRYL J&J -A: Performed by: SURGERY

## 2017-11-14 PROCEDURE — 74011250637 HC RX REV CODE- 250/637: Performed by: SURGERY

## 2017-11-14 PROCEDURE — 74011250636 HC RX REV CODE- 250/636: Performed by: NURSE ANESTHETIST, CERTIFIED REGISTERED

## 2017-11-14 RX ORDER — GABAPENTIN 300 MG/1
300 CAPSULE ORAL
Status: DISCONTINUED | OUTPATIENT
Start: 2017-11-14 | End: 2017-11-15 | Stop reason: HOSPADM

## 2017-11-14 RX ORDER — ESCITALOPRAM OXALATE 10 MG/1
10 TABLET ORAL DAILY
Status: DISCONTINUED | OUTPATIENT
Start: 2017-11-14 | End: 2017-11-15 | Stop reason: HOSPADM

## 2017-11-14 RX ORDER — VECURONIUM BROMIDE FOR INJECTION 1 MG/ML
INJECTION, POWDER, LYOPHILIZED, FOR SOLUTION INTRAVENOUS AS NEEDED
Status: DISCONTINUED | OUTPATIENT
Start: 2017-11-14 | End: 2017-11-14 | Stop reason: HOSPADM

## 2017-11-14 RX ORDER — SODIUM CHLORIDE 0.9 % (FLUSH) 0.9 %
5-10 SYRINGE (ML) INJECTION AS NEEDED
Status: DISCONTINUED | OUTPATIENT
Start: 2017-11-14 | End: 2017-11-15 | Stop reason: HOSPADM

## 2017-11-14 RX ORDER — NEOSTIGMINE METHYLSULFATE 5 MG/5 ML
SYRINGE (ML) INTRAVENOUS AS NEEDED
Status: DISCONTINUED | OUTPATIENT
Start: 2017-11-14 | End: 2017-11-14 | Stop reason: HOSPADM

## 2017-11-14 RX ORDER — SODIUM CHLORIDE, SODIUM LACTATE, POTASSIUM CHLORIDE, CALCIUM CHLORIDE 600; 310; 30; 20 MG/100ML; MG/100ML; MG/100ML; MG/100ML
INJECTION, SOLUTION INTRAVENOUS
Status: DISCONTINUED | OUTPATIENT
Start: 2017-11-14 | End: 2017-11-14 | Stop reason: HOSPADM

## 2017-11-14 RX ORDER — SODIUM CHLORIDE, SODIUM LACTATE, POTASSIUM CHLORIDE, CALCIUM CHLORIDE 600; 310; 30; 20 MG/100ML; MG/100ML; MG/100ML; MG/100ML
75 INJECTION, SOLUTION INTRAVENOUS CONTINUOUS
Status: DISCONTINUED | OUTPATIENT
Start: 2017-11-14 | End: 2017-11-14 | Stop reason: HOSPADM

## 2017-11-14 RX ORDER — DIPHENHYDRAMINE HYDROCHLORIDE 50 MG/ML
25 INJECTION, SOLUTION INTRAMUSCULAR; INTRAVENOUS
Status: DISCONTINUED | OUTPATIENT
Start: 2017-11-14 | End: 2017-11-15 | Stop reason: HOSPADM

## 2017-11-14 RX ORDER — HYDROCODONE BITARTRATE AND ACETAMINOPHEN 5; 325 MG/1; MG/1
1-2 TABLET ORAL
Status: DISCONTINUED | OUTPATIENT
Start: 2017-11-14 | End: 2017-11-15 | Stop reason: HOSPADM

## 2017-11-14 RX ORDER — MIDAZOLAM HYDROCHLORIDE 1 MG/ML
INJECTION, SOLUTION INTRAMUSCULAR; INTRAVENOUS AS NEEDED
Status: DISCONTINUED | OUTPATIENT
Start: 2017-11-14 | End: 2017-11-14 | Stop reason: HOSPADM

## 2017-11-14 RX ORDER — HYDROMORPHONE HYDROCHLORIDE 2 MG/ML
0.5 INJECTION, SOLUTION INTRAMUSCULAR; INTRAVENOUS; SUBCUTANEOUS
Status: DISCONTINUED | OUTPATIENT
Start: 2017-11-14 | End: 2017-11-14 | Stop reason: HOSPADM

## 2017-11-14 RX ORDER — GABAPENTIN 300 MG/1
300 CAPSULE ORAL
COMMUNITY

## 2017-11-14 RX ORDER — OXYCODONE AND ACETAMINOPHEN 5; 325 MG/1; MG/1
1-2 TABLET ORAL
Status: DISCONTINUED | OUTPATIENT
Start: 2017-11-14 | End: 2017-11-14

## 2017-11-14 RX ORDER — DIPHENHYDRAMINE HYDROCHLORIDE 50 MG/ML
25 INJECTION, SOLUTION INTRAMUSCULAR; INTRAVENOUS
Status: DISCONTINUED | OUTPATIENT
Start: 2017-11-14 | End: 2017-11-14 | Stop reason: HOSPADM

## 2017-11-14 RX ORDER — ESCITALOPRAM OXALATE 10 MG/1
10 TABLET ORAL DAILY
COMMUNITY

## 2017-11-14 RX ORDER — FENTANYL CITRATE 50 UG/ML
50 INJECTION, SOLUTION INTRAMUSCULAR; INTRAVENOUS AS NEEDED
Status: DISCONTINUED | OUTPATIENT
Start: 2017-11-14 | End: 2017-11-14 | Stop reason: HOSPADM

## 2017-11-14 RX ORDER — FENTANYL CITRATE 50 UG/ML
INJECTION, SOLUTION INTRAMUSCULAR; INTRAVENOUS AS NEEDED
Status: DISCONTINUED | OUTPATIENT
Start: 2017-11-14 | End: 2017-11-14 | Stop reason: HOSPADM

## 2017-11-14 RX ORDER — HYDROCODONE BITARTRATE AND ACETAMINOPHEN 5; 325 MG/1; MG/1
1.2 TABLET ORAL
Status: DISCONTINUED | OUTPATIENT
Start: 2017-11-14 | End: 2017-11-14

## 2017-11-14 RX ORDER — KETOROLAC TROMETHAMINE 30 MG/ML
INJECTION, SOLUTION INTRAMUSCULAR; INTRAVENOUS AS NEEDED
Status: DISCONTINUED | OUTPATIENT
Start: 2017-11-14 | End: 2017-11-14 | Stop reason: HOSPADM

## 2017-11-14 RX ORDER — ONDANSETRON 2 MG/ML
INJECTION INTRAMUSCULAR; INTRAVENOUS AS NEEDED
Status: DISCONTINUED | OUTPATIENT
Start: 2017-11-14 | End: 2017-11-14 | Stop reason: HOSPADM

## 2017-11-14 RX ORDER — SODIUM CHLORIDE 0.9 % (FLUSH) 0.9 %
5-10 SYRINGE (ML) INJECTION EVERY 8 HOURS
Status: DISCONTINUED | OUTPATIENT
Start: 2017-11-14 | End: 2017-11-14

## 2017-11-14 RX ORDER — LORAZEPAM 2 MG/ML
1 INJECTION INTRAMUSCULAR ONCE
Status: COMPLETED | OUTPATIENT
Start: 2017-11-14 | End: 2017-11-14

## 2017-11-14 RX ORDER — DEXTROSE MONOHYDRATE AND SODIUM CHLORIDE 5; .45 G/100ML; G/100ML
100 INJECTION, SOLUTION INTRAVENOUS CONTINUOUS
Status: DISCONTINUED | OUTPATIENT
Start: 2017-11-14 | End: 2017-11-15 | Stop reason: HOSPADM

## 2017-11-14 RX ORDER — HYDROCODONE BITARTRATE AND ACETAMINOPHEN 5; 325 MG/1; MG/1
1 TABLET ORAL ONCE
Status: DISCONTINUED | OUTPATIENT
Start: 2017-11-14 | End: 2017-11-14 | Stop reason: HOSPADM

## 2017-11-14 RX ORDER — GLYCOPYRROLATE 0.2 MG/ML
INJECTION INTRAMUSCULAR; INTRAVENOUS AS NEEDED
Status: DISCONTINUED | OUTPATIENT
Start: 2017-11-14 | End: 2017-11-14 | Stop reason: HOSPADM

## 2017-11-14 RX ORDER — DEXAMETHASONE SODIUM PHOSPHATE 4 MG/ML
INJECTION, SOLUTION INTRA-ARTICULAR; INTRALESIONAL; INTRAMUSCULAR; INTRAVENOUS; SOFT TISSUE AS NEEDED
Status: DISCONTINUED | OUTPATIENT
Start: 2017-11-14 | End: 2017-11-14 | Stop reason: HOSPADM

## 2017-11-14 RX ORDER — ONDANSETRON 2 MG/ML
4 INJECTION INTRAMUSCULAR; INTRAVENOUS
Status: DISCONTINUED | OUTPATIENT
Start: 2017-11-14 | End: 2017-11-15 | Stop reason: HOSPADM

## 2017-11-14 RX ORDER — PROPOFOL 10 MG/ML
INJECTION, EMULSION INTRAVENOUS AS NEEDED
Status: DISCONTINUED | OUTPATIENT
Start: 2017-11-14 | End: 2017-11-14 | Stop reason: HOSPADM

## 2017-11-14 RX ORDER — LIDOCAINE HYDROCHLORIDE 20 MG/ML
INJECTION, SOLUTION EPIDURAL; INFILTRATION; INTRACAUDAL; PERINEURAL AS NEEDED
Status: DISCONTINUED | OUTPATIENT
Start: 2017-11-14 | End: 2017-11-14 | Stop reason: HOSPADM

## 2017-11-14 RX ORDER — SUCCINYLCHOLINE CHLORIDE 20 MG/ML
INJECTION INTRAMUSCULAR; INTRAVENOUS AS NEEDED
Status: DISCONTINUED | OUTPATIENT
Start: 2017-11-14 | End: 2017-11-14 | Stop reason: HOSPADM

## 2017-11-14 RX ADMIN — DEXTROSE MONOHYDRATE AND SODIUM CHLORIDE 100 ML/HR: 5; .45 INJECTION, SOLUTION INTRAVENOUS at 15:50

## 2017-11-14 RX ADMIN — DIPHENHYDRAMINE HYDROCHLORIDE 25 MG: 50 INJECTION, SOLUTION INTRAMUSCULAR; INTRAVENOUS at 21:14

## 2017-11-14 RX ADMIN — SODIUM CHLORIDE, SODIUM LACTATE, POTASSIUM CHLORIDE, AND CALCIUM CHLORIDE 75 ML/HR: 600; 310; 30; 20 INJECTION, SOLUTION INTRAVENOUS at 14:43

## 2017-11-14 RX ADMIN — ONDANSETRON 4 MG: 2 INJECTION INTRAMUSCULAR; INTRAVENOUS at 14:05

## 2017-11-14 RX ADMIN — SODIUM CHLORIDE 1000 ML: 900 INJECTION, SOLUTION INTRAVENOUS at 10:12

## 2017-11-14 RX ADMIN — LORAZEPAM 1 MG: 2 INJECTION INTRAMUSCULAR; INTRAVENOUS at 14:42

## 2017-11-14 RX ADMIN — Medication 10 ML: at 17:51

## 2017-11-14 RX ADMIN — VECURONIUM BROMIDE FOR INJECTION 1 MG: 1 INJECTION, POWDER, LYOPHILIZED, FOR SOLUTION INTRAVENOUS at 13:15

## 2017-11-14 RX ADMIN — FENTANYL CITRATE 50 MCG: 50 INJECTION, SOLUTION INTRAMUSCULAR; INTRAVENOUS at 14:05

## 2017-11-14 RX ADMIN — PROPOFOL 150 MG: 10 INJECTION, EMULSION INTRAVENOUS at 13:15

## 2017-11-14 RX ADMIN — GABAPENTIN 300 MG: 300 CAPSULE ORAL at 21:14

## 2017-11-14 RX ADMIN — Medication 3 MG: at 14:05

## 2017-11-14 RX ADMIN — LIDOCAINE HYDROCHLORIDE 100 MG: 20 INJECTION, SOLUTION EPIDURAL; INFILTRATION; INTRACAUDAL; PERINEURAL at 13:15

## 2017-11-14 RX ADMIN — FENTANYL CITRATE 150 MCG: 50 INJECTION, SOLUTION INTRAMUSCULAR; INTRAVENOUS at 13:14

## 2017-11-14 RX ADMIN — SODIUM CHLORIDE, SODIUM LACTATE, POTASSIUM CHLORIDE, CALCIUM CHLORIDE: 600; 310; 30; 20 INJECTION, SOLUTION INTRAVENOUS at 13:08

## 2017-11-14 RX ADMIN — DEXAMETHASONE SODIUM PHOSPHATE 4 MG: 4 INJECTION, SOLUTION INTRA-ARTICULAR; INTRALESIONAL; INTRAMUSCULAR; INTRAVENOUS; SOFT TISSUE at 13:23

## 2017-11-14 RX ADMIN — KETOROLAC TROMETHAMINE 30 MG: 30 INJECTION, SOLUTION INTRAMUSCULAR; INTRAVENOUS at 14:05

## 2017-11-14 RX ADMIN — ESCITALOPRAM OXALATE 10 MG: 10 TABLET ORAL at 17:50

## 2017-11-14 RX ADMIN — SUCCINYLCHOLINE CHLORIDE 100 MG: 20 INJECTION INTRAMUSCULAR; INTRAVENOUS at 13:15

## 2017-11-14 RX ADMIN — VECURONIUM BROMIDE FOR INJECTION 3 MG: 1 INJECTION, POWDER, LYOPHILIZED, FOR SOLUTION INTRAVENOUS at 13:21

## 2017-11-14 RX ADMIN — OXYCODONE HYDROCHLORIDE AND ACETAMINOPHEN 2 TABLET: 5; 325 TABLET ORAL at 19:39

## 2017-11-14 RX ADMIN — GLYCOPYRROLATE 0.4 MG: 0.2 INJECTION INTRAMUSCULAR; INTRAVENOUS at 14:05

## 2017-11-14 RX ADMIN — MIDAZOLAM HYDROCHLORIDE 2 MG: 1 INJECTION, SOLUTION INTRAMUSCULAR; INTRAVENOUS at 13:06

## 2017-11-14 RX ADMIN — PIPERACILLIN SODIUM,TAZOBACTAM SODIUM 3.38 G: 3; .375 INJECTION, POWDER, FOR SOLUTION INTRAVENOUS at 12:24

## 2017-11-14 NOTE — PROGRESS NOTES
conducted an initial consultation and Spiritual Assessment for Morgan Bhatia, who is a 47 y.o.,female. Patients Primary Language is: [place]. According to the patients EMR Worship Affiliation is: Baptism. The reason the Patient came to the hospital is:   Patient Active Problem List    Diagnosis Date Noted    Acute appendicitis 11/14/2017        The  provided the following Interventions:  Initiated a relationship of care and support. Explored issues of tiesha, spirituality and/or Scientologist needs while hospitalized. Listened empathically. Provided chaplaincy education. Provided information about Spiritual Care Services. Offered prayer and assurance of continued prayers on patient's behalf. Chart reviewed. The following outcomes were achieved:  Patient shared some information about their medical narrative and spiritual journey/beliefs. Patient processed feeling about current hospitalization. Patient expressed gratitude for the 's visit. Assessment:  Patient did not indicate any spiritual or Scientologist issues which require Spiritual Care Services interventions at this time. Patient does not have any Scientologist/cultural needs that will affect patients preferences in health care. Plan:  Chaplains will continue to follow and will provide pastoral care on an as needed or requested basis.  recommends bedside caregivers page  on duty if patient shows signs of acute spiritual or emotional distress.     [address] [facility]   Staff [address] [facility]   [phone]

## 2017-11-14 NOTE — ANESTHESIA PREPROCEDURE EVALUATION
Anesthetic History   No history of anesthetic complications            Review of Systems / Medical History  Patient summary reviewed and pertinent labs reviewed    Pulmonary  Within defined limits                 Neuro/Psych         Psychiatric history    Comments: Depression Cardiovascular  Within defined limits                Exercise tolerance: >4 METS     GI/Hepatic/Renal  Within defined limits              Endo/Other  Within defined limits           Other Findings   Comments:   Risk Factors for Postoperative nausea/vomiting:       History of postoperative nausea/vomiting? NO       Female? YES       Motion sickness? NO       Intended opioid administration for postoperative analgesia? YES      Smoking Abstinence  Current Smoker? NO  Elective Surgery? NO  Seen preoperatively by anesthesiologist or proxy prior to day of surgery? YES  Pt abstained from smoking 24 hours prior to anesthesia?  N/A         Physical Exam    Airway  Mallampati: I  TM Distance: 4 - 6 cm  Neck ROM: normal range of motion   Mouth opening: Normal     Cardiovascular  Regular rate and rhythm,  S1 and S2 normal,  no murmur, click, rub, or gallop  Rhythm: regular  Rate: normal         Dental    Dentition: Lower dentition intact and Upper dentition intact     Pulmonary  Breath sounds clear to auscultation               Abdominal  GI exam deferred       Other Findings            Anesthetic Plan    ASA: 2, emergent  Anesthesia type: general          Induction: RSI and Intravenous  Anesthetic plan and risks discussed with: Patient

## 2017-11-14 NOTE — OP NOTES
Operative Note    Procedure: Laparoscopic Appendectomy    Pre-operative Diagnosis: acute appendicitis    Post-operative Diagnosis: acute appendicitis    Anesthesia: General     Surgeon: Dr Jaciel Jones MD FACS    Assistant:  SA Cheung    Findings: acute appendicitis, nonperforated    Estimated Blood Loss:  10mL           Drains: none           Total IV Fluids: 800ml           Specimens: Appendix           Complications:  None; patient tolerated the procedure well. Disposition: PACU - hemodynamically stable. Condition: stable    Procedure Details:     Patient was interviewed and examined, diagnosed with suspected acute appendicitis and recommended to proceed to the operating room for laparoscopic, possible open appendectomy. Informed consent was verified. The risks, benefits, complications, treatment options, and expected outcomes were discussed with the patient. The possibilities of reaction to medication, pulmonary aspiration, perforation of viscus, bleeding, recurrent infection, finding a normal appendix, the need for additional procedures, failure to diagnose a condition, the possible need to convert to an open procedure, and creating a complication requiring transfusion or operation were discussed with the patient. The patient and/or family concurred with the proposed plan, giving informed consent. The patient was taken to the operating room, sequential compression devices were placed, intravenous antibiotics were administered and general endotracheal anesthesia was administered. Time out procedure was performed during which all parties agreed to the proposed planned operation. Muro catheter was placed under sterile conditions. The abdomen was then prepped and draped in the usual sterile fashion. LUQ pneumoperitoneum established after a negative drop test.  Insufflation to 15mmHg.   Infraumbilical incision was made and dissection was carried down using an Optiview 5mm Trocar with 5-0 scope. We verified there was no evidence of injury upon entry. We then switched over to a 5-30 scope and surveyed the abdomen. A 360-degree evaluation of the abdomen was then performed from this trocar site. There were no peritoneal implants identified. The right lower quadrant demonstrated an acute inflammatory process. A 5mm suprapubic trocar was placed under direct visualization as well as a left lower quadrant 10/12 trocar in similar fashion. The cecum was identified as was the terminal ileum. The appendix was dissected free from the surrounding inflammatory tissue using a combination of blunt and suction dissection. The base of the appendix as dissected free from the cecum with a [place] and then a blue load of the [place] stapler was utilized to transect it. The mesoappendix was then divided using a white load. The appendix was then placed in an Endocatch bag and retrieved through the LLQ trocar. The RLQ was then irrigated with saline and aspirated. The staple lines were checked, verified to be hemostatic, staples well formed. Pneumoinsufflation was then vented through the trocar sites. The trocars were vented then removed, incisions were anesthetized with local anesthesia, rendered hemostatic and closed with 4-0 Monocryl and Dermabond. Muro catheter was removed in the operating room. The patient was extubated in the room and taken to the [address] [facility]  Unit in stable condition. Instrument, sponge, and needle counts were reported as  correct x2.

## 2017-11-14 NOTE — ROUTINE PROCESS
TRANSFER - IN REPORT:    Verbal report received from Tim Dupree RN (name) on [facility]  being received from PACU (unit) for routine post - op      Report consisted of patients Situation, Background, Assessment and   Recommendations(SBAR). Information from the following report(s) SBAR, Kardex, OR Summary, Intake/Output, MAR and Recent Results was reviewed with the receiving nurse. Opportunity for questions and clarification was provided. Assessment completed upon patients arrival to unit and care assumed.

## 2017-11-14 NOTE — PROGRESS NOTES
Problem: Falls - Risk of  Goal: *Absence of Falls  Document Hawk Fall Risk and appropriate interventions in the flowsheet.    Outcome: Progressing Towards Goal  Fall Risk Interventions:  Mobility Interventions: Patient to call before getting OOB    Mentation Interventions: Door open when patient unattended    Medication Interventions: Patient to call before getting OOB, Teach patient to arise slowly    Elimination Interventions: Patient to call for help with toileting needs, Toileting schedule/hourly rounds, Call light in reach    History of Falls Interventions: Door open when patient unattended

## 2017-11-14 NOTE — ED PROVIDER NOTES
HPI Comments: 49yo female with hx of SVT and ablation presenting to ED with abdominal pain since last night. Pt reports pain in central abdomen since early this am that has since localized to RLQ. Pt c/o chills, diarrhea and nausea. Denies vomiting. Denies fever, headaches, dizziness, CP, SOB, neck pain, back pain, urinary symptoms or any other symptoms at this time. Has not been able to tolerate PO over past day. Patient is a 47 y.o. female presenting with abdominal pain. Abdominal Pain    This is a new problem. Episode onset: 8 hours. Pertinent negatives include no fever, no nausea, no vomiting, no headaches, no chest pain and no back pain. No past medical history on file. No past surgical history on file. No family history on file. Social History     Social History    Marital status: SINGLE     Spouse name: N/A    Number of children: N/A    Years of education: N/A     Occupational History    Not on file. Social History Main Topics    Smoking status: Not on file    Smokeless tobacco: Not on file    Alcohol use Not on file    Drug use: Not on file    Sexual activity: Not on file     Other Topics Concern    Not on file     Social History Narrative    No narrative on file         ALLERGIES: Clindamycin and Iodinated contrast- oral and iv dye    Review of Systems   Constitutional: Positive for chills. Negative for fever. HENT: Negative. Negative for congestion and facial swelling. Eyes: Negative for discharge and redness. Respiratory: Negative for cough and shortness of breath. Cardiovascular: Negative for chest pain. Gastrointestinal: Positive for abdominal pain. Negative for nausea and vomiting. Endocrine: Negative. Genitourinary: Negative. Musculoskeletal: Negative for back pain and neck pain. Skin: Negative for rash and wound. Allergic/Immunologic: Negative. Neurological: Negative for dizziness, light-headedness and headaches.    Hematological: Negative. Psychiatric/Behavioral: Negative. Vitals:    11/14/17 1015 11/14/17 1030 11/14/17 1130 11/14/17 1145   BP: 120/67 113/64 116/65 115/64   Pulse: 72 72 72 74   Resp: 14 16     Temp:       SpO2: 100% 99% 100% 100%   Weight:       Height:                Physical Exam   Constitutional: She is oriented to person, place, and time. She appears well-developed and well-nourished. No distress. HENT:   Head: Normocephalic and atraumatic. Mouth/Throat: Oropharynx is clear and moist.   Eyes: Conjunctivae are normal.   Neck: Normal range of motion. Neck supple. Cardiovascular: Normal rate, regular rhythm and normal heart sounds. Pulmonary/Chest: Effort normal and breath sounds normal. No respiratory distress. She has no wheezes. She exhibits no tenderness. Abdominal: Soft. She exhibits no distension and no mass. There is tenderness in the right lower quadrant. There is rebound. There is no guarding, no CVA tenderness, no tenderness at McBurney's point and negative Ballesteros's sign. Musculoskeletal: Normal range of motion. Neurological: She is alert and oriented to person, place, and time. No cranial nerve deficit. Coordination normal.   Skin: Skin is warm and dry. No rash noted. She is not diaphoretic. Psychiatric: She has a normal mood and affect. Nursing note and vitals reviewed. MDM  Number of Diagnoses or Management Options  Diagnosis management comments: Consult: Discussed care with Dr. Barbara Vergara (surgeon on call). Standard discussion; including history of patients chief complaint, available diagnostic results, and treatment course. Agrees to admit to surgery. Recommends IV zosyn. No further orders. Reviewed workup results, any meds, and admission plan with patient and any family present. Answered all questions. They agree to the plan for admission at this time.   Efrem Parks PA-C          Amount and/or Complexity of Data Reviewed  Clinical lab tests: ordered and reviewed  Tests in the radiology section of CPT®: ordered and reviewed      ED Course       Procedures

## 2017-11-14 NOTE — H&P
Lou Bond is a 47 y.o. female referred by ED for evaluation of acute appendicitis. Has had a 18  hour history of abdominal pain, predominantly in the generalized abdominal cavity, but has since localized to the RLQ. She notes that for the 1-2 days prior she has had malaise, fatigue and an episode of diarrhea 2 nights ago. .  Patient has nausea denies vomitting denies fevers and has chills. has diarrhea, denies constipation. No other associated findings. Since has been in the ED, pain has improved. History reviewed. No pertinent past medical history. Had a cardiac ablation 3 years ago for SVT  IBS/gluten intolerance (states \"I have a terrible stomach\")      Surgical History:  Has donated bone marrow  cystoscopy    Current Facility-Administered Medications   Medication Dose Route Frequency Provider Last Rate Last Dose    piperacillin-tazobactam (ZOSYN) 3.375 g in 0.9% sodium chloride (MBP/ADV) 100 mL MBP  3.375 g IntraVENous NOW Efrem Parks PA-C   3.375 g at 11/14/17 1224       Allergies   Allergen Reactions    Clindamycin Swelling    Iodinated Contrast- Oral And Iv Dye Hives     Home medications:  Lexapro 10mg po daily  Prempro 0.24-2.5mg po daily  Gabapentin 300mg po qhs    Social History     Social History    Marital status: SINGLE     Spouse name: N/A    Number of children: N/A    Years of education: N/A     Occupational History    Not on file.      Social History Main Topics    Smoking status: Not on file    Smokeless tobacco: Not on file    Alcohol use Not on file    Drug use: Not on file    Sexual activity: Not on file     Other Topics Concern    Not on file     Social History Narrative    No narrative on file       Family history noncontributory      Review of Systems    ROS, positive where in bold:    General: fevers, chills, night sweats, fatigue, weight loss, weight gain    GI: as per HPI    Integumentary: dermatitis or abnormal moles    HEENT:  visual changes, vertigo, epistaxis, dysphagia, hoarseness    Cardiac: chest pain, palpitations, hypertension, edema,  varicosities    Resp:  cough, shortness of breath, wheezing, hemoptysis, snoring,  reactive airway disease    : hematuria, dysuria, frequency, urgency, nocturia, stress urinary incontinence    MSK: weakness, joint pain, arthritis    Endocrine: diabetes, thyroid disease, polyuria, polydipsia, polyphagia, poor wound healing, heat intolerance,cold intolerance    Lymph/Heme: anemia, bruising, history of blood transfusions    Neuro: dizziness, headache, fainting, seizures, stroke    Psychiatry:  Anxiety, depression, psychosis         Objective:     Physical Exam:  Visit Vitals    /74    Pulse 73    Temp 97.7 °F (36.5 °C)    Resp 16    Ht 5' 8\" (1.727 m)    Wt 63.5 kg (140 lb)    LMP 07/14/2017 (Within Months)  Comment: pre menopuase    SpO2 100%    BMI 21.29 kg/m2       General: Well developed, well nourished 47 y.o. female in no acute distress  ENMT: normocephalic, atraumatic mouth:clear, no overt lesions, oral mucosa pink and moist  Neck: supple, no masses, no adenopathy or carotid bruits, trachea midline  Skin: warm, smooth, dry and well perfused  Respiratory: clear to auscultation bilaterally, no wheeze, rhonchi or rales, excursions normal and symmetrical  Cardiovascular: Regular rate and rhythm, no murmurs, clicks, gallops or rubs, no edema or varicosities  Gastrointestinal: soft, RLQ tender, nondistended, normoactive bowel sounds, no hernias, no hepatosplenomegaly  Musculoskeletal: warm, well-perfused, no tenderness or swelling, normal gait/station  Neuro: sensation and strength grossly intact and symmetrical  Psych: alert and oriented to person, place and time      Recent Results (from the past 12 hour(s))   HCG URINE, QL    Collection Time: 11/14/17  8:24 AM   Result Value Ref Range    HCG urine, Ql. NEGATIVE  NEG     URINALYSIS W/ RFLX MICROSCOPIC    Collection Time: 11/14/17  8:24 AM   Result Value Ref Range    Color YELLOW      Appearance CLEAR      Specific gravity 1.023 1.005 - 1.030      pH (UA) 6.0 5.0 - 8.0      Protein NEGATIVE  NEG mg/dL    Glucose NEGATIVE  NEG mg/dL    Ketone TRACE (A) NEG mg/dL    Bilirubin NEGATIVE  NEG      Blood MODERATE (A) NEG      Urobilinogen 1.0 0.2 - 1.0 EU/dL    Nitrites NEGATIVE  NEG      Leukocyte Esterase TRACE (A) NEG     URINE MICROSCOPIC ONLY    Collection Time: 11/14/17  8:24 AM   Result Value Ref Range    WBC 0 to 3 0 - 4 /hpf    RBC 4 to 10 0 - 5 /hpf    Epithelial cells 1+ 0 - 5 /lpf    Bacteria 3+ (A) NEG /hpf    Mucus 2+ (A) NEG /lpf   CBC WITH AUTOMATED DIFF    Collection Time: 11/14/17  9:00 AM   Result Value Ref Range    WBC 10.9 4.6 - 13.2 K/uL    RBC 4.37 4.20 - 5.30 M/uL    HGB 13.6 12.0 - 16.0 g/dL    HCT 38.7 35.0 - 45.0 %    MCV 88.6 74.0 - 97.0 FL    MCH 31.1 24.0 - 34.0 PG    MCHC 35.1 31.0 - 37.0 g/dL    RDW 12.3 11.6 - 14.5 %    PLATELET 324 300 - 282 K/uL    MPV 9.2 9.2 - 11.8 FL    NEUTROPHILS 79 (H) 40 - 73 %    LYMPHOCYTES 14 (L) 21 - 52 %    MONOCYTES 6 3 - 10 %    EOSINOPHILS 1 0 - 5 %    BASOPHILS 0 0 - 2 %    ABS. NEUTROPHILS 8.6 (H) 1.8 - 8.0 K/UL    ABS. LYMPHOCYTES 1.5 0.9 - 3.6 K/UL    ABS. MONOCYTES 0.7 0.05 - 1.2 K/UL    ABS. EOSINOPHILS 0.1 0.0 - 0.4 K/UL    ABS. BASOPHILS 0.0 0.0 - 0.06 K/UL    DF AUTOMATED     METABOLIC PANEL, COMPREHENSIVE    Collection Time: 11/14/17  9:00 AM   Result Value Ref Range    Sodium 138 136 - 145 mmol/L    Potassium 3.9 3.5 - 5.5 mmol/L    Chloride 105 100 - 108 mmol/L    CO2 28 21 - 32 mmol/L    Anion gap 5 3.0 - 18 mmol/L    Glucose 106 (H) 74 - 99 mg/dL    BUN 9 7.0 - 18 MG/DL    Creatinine 0.74 0.6 - 1.3 MG/DL    BUN/Creatinine ratio 12 12 - 20      GFR est AA >60 >60 ml/min/1.73m2    GFR est non-AA >60 >60 ml/min/1.73m2    Calcium 8.7 8.5 - 10.1 MG/DL    Bilirubin, total 0.5 0.2 - 1.0 MG/DL    ALT (SGPT) 19 13 - 56 U/L    AST (SGOT) 13 (L) 15 - 37 U/L    Alk.  phosphatase 81 45 - 117 U/L    Protein, total 7.1 6.4 - 8.2 g/dL    Albumin 3.7 3.4 - 5.0 g/dL    Globulin 3.4 2.0 - 4.0 g/dL    A-G Ratio 1.1 0.8 - 1.7     CARDIAC PANEL,(CK, CKMB & TROPONIN)    Collection Time: 11/14/17  9:00 AM   Result Value Ref Range    CK 55 26 - 192 U/L    CK - MB <1.0 <3.6 ng/ml    CK-MB Index  0.0 - 4.0 %     CALCULATION NOT PERFORMED WHEN RESULT IS BELOW LINEAR LIMIT    Troponin-I, Qt. <0.02 0.0 - 0.045 NG/ML   EKG, 12 LEAD, INITIAL    Collection Time: 11/14/17 10:09 AM   Result Value Ref Range    Ventricular Rate 71 BPM    Atrial Rate 71 BPM    P-R Interval 144 ms    QRS Duration 82 ms    Q-T Interval 398 ms    QTC Calculation (Bezet) 432 ms    Calculated P Axis 62 degrees    Calculated R Axis 88 degrees    Calculated T Axis 77 degrees    Diagnosis       Normal sinus rhythm  Normal ECG  No previous ECGs available  Confirmed by Alverto Avila ([id number]) on 11/14/2017 11:01:35 AM           Imaging:  EXAM: CT of the abdomen and pelvis     INDICATION: 47year-old patient with diffuse abdominal pain, nausea.     COMPARISON: None.     TECHNIQUE: Axial CT imaging of the abdomen and pelvis was performed without oral  or intravenous contrast. Multiplanar reformats were generated.     One or more dose reduction techniques were used on this CT: automated exposure  control, adjustment of the mAs and/or kVp according to patient's size, and  iterative reconstruction techniques. The specific techniques utilized on this CT  exam have been documented in the patient's electronic medical record.  _______________     FINDINGS:     LOWER CHEST: Unremarkable.     LIVER, BILIARY: The unenhanced appearance of the liver demonstrates no focal  abnormality. No biliary dilation. Gallbladder is unremarkable.     PANCREAS: Normal.     SPLEEN: Normal.     ADRENALS: Normal.     KIDNEYS/URETERS/BLADDER: No evidence of hydronephrosis or nephrolithiasis.  No  distal ureteral or bladder stone.     PELVIC ORGANS: Uterus and adnexa have an unremarkable unenhanced appearance.     VASCULATURE: Unremarkable     LYMPH NODES: There are scattered subcentimeter mesenteric and retroperitoneal  lymph nodes, with an increased number of subcentimeter lymph nodes present along  the ileocolic mesentery.     GASTROINTESTINAL TRACT: There is a retrocecal position to the appendix. There is  moderate periappendiceal fat stranding, and a small amount of fluid noted along  the adjacent inferior right paracolic gutter. The appendix is fluid-filled, with  AP diameter of approximately 9 mm. No evidence of radiopaque appendicolith. No  discrete periappendiceal fluid collection. No free intraperitoneal gas. Remaining small and large intestine are normal in course and caliber. No bowel  obstruction.     BONES: No acute or aggressive osseous abnormalities identified.     OTHER: None.     _______________     IMPRESSION  IMPRESSION:     1.  CT findings in keeping with acute appendicitis. No organized periappendiceal  fluid collection. No bowel obstruction or free intraperitoneal gas. Assessment:   APPENDICITIS:   Vera Birmingham presents with abdominal pain. The history does appear to be consistent with appendicitis. , acute    Plan:     - Proceed to OR for laparoscopic, possible open appendectomy. Discussed risks, benefits and likely outcomes of surgery including bleeding, infection, need for reoperation, damage to surrounding structures, development of fecal fistula, possible negative appendectomy, risks of anesthesia.   We have also discussed option of antibiotic only management and have decided to proceed with surgical treatment.  -informed consent obtained  -preoperative antibiotics ordered

## 2017-11-14 NOTE — ED TRIAGE NOTES
Pt arrives with diffuse abd pain that started 3-4 days ago with anorexia and nausea, pain localized in middle of night to lower right side, denies vomiting, but when stands feels nauseous

## 2017-11-14 NOTE — IP AVS SNAPSHOT
Misti Rivera 
 
 
 [address] [facility] [id number] [place] [address] Patient: Max Hewitt MRN: [id number] [facility]:[id number]/3/[id number] About your hospitalization You were admitted on:  November 14, 2017 You last received care in the:  [address] [place] [address],[address] You were discharged on:  November 15, 2017 Why you were hospitalized Your primary diagnosis was:  Not on File Your diagnoses also included:  Acute Appendicitis Things You Need To Do (next 8 weeks) Follow up with Sandor Romberg, MD in 2 week(s)  
for follow up Phone:  [phone] Where:  [address] [facility], [facility], [address] [place] [address] Discharge Orders None A check yosi indicates which time of day the medication should be taken. My Medications TAKE these medications as instructed Instructions Each Dose to Equal  
 Morning Noon Evening Bedtime  
 escitalopram oxalate 10 mg tablet Commonly known as:  Tina Blas Your last dose was: Your next dose is: Take 10 mg by mouth daily. 10 mg HYDROcodone-acetaminophen 5-325 mg per tablet Commonly known as:  Juanetta Rasp Your last dose was: Your next dose is: Take 1-2 Tabs by mouth every six (6) hours as needed. Max Daily Amount: 8 Tabs. 1-2 Tab NEURONTIN 300 mg capsule Generic drug:  gabapentin Your last dose was: Your next dose is: Take 300 mg by mouth nightly. 300 mg PREMPRO 0.625-2.5 mg per tablet Generic drug:  estrogen (conjugated)-medroxyPROGESTERone Your last dose was: Your next dose is: Take 1 Tab by mouth daily. 1 Tab Where to Get Your Medications Information on where to get these meds will be given to you by the nurse or doctor. ! Ask your nurse or doctor about these medications HYDROcodone-acetaminophen 5-325 mg per tablet Discharge Instructions Appendectomy: What to Expect at [facility] Your Recovery Your doctor removed your appendix either by making many small cuts, called incisions, in your belly (laparoscopic surgery) or through open surgery. In open surgery, the doctor makes one large incision. The incisions leave scars that usually fade over time. After your surgery, it is normal to feel weak and tired for several days after you return home. Your belly may be swollen and may be painful. If you had laparoscopic surgery, you may have pain in your shoulder for about 24 hours. You may also feel sick to your stomach and have diarrhea, constipation, gas, or a headache. This usually goes away in a few days. Your recovery time depends on the type of surgery you had. If you had laparoscopic surgery, you will probably be able to return to work or a normal routine 1 to 3 weeks after surgery. If you had an open surgery, it may take 2 to 4 weeks. If your appendix ruptured, you may have a drain in your incision. Your body will work fine without an appendix. You will not have to make any changes in your diet or lifestyle. This care sheet gives you a general idea about how long it will take for you to recover. But each person recovers at a different pace. Follow the steps below to get better as quickly as possible. How can you care for yourself at home? Activity ? · Rest when you feel tired. Getting enough sleep will help you recover. ? · Try to walk each day. Start by walking a little more than you did the day before. Bit by bit, increase the amount you walk. Walking boosts blood flow and helps prevent pneumonia and constipation. ? · For about 2 weeks, avoid lifting anything that would make you strain.  This may include a child, heavy grocery bags and milk containers, a heavy briefcase or backpack, cat litter or dog food bags, or a vacuum . ? · Avoid strenuous activities, such as bicycle riding, jogging, weight lifting, or aerobic exercise, until your doctor says it is okay. ? · You may be able to take showers (unless you have a drain near your incision) 24 to 48 hours after surgery. Pat the incision dry. Do not take a bath for the first 2 weeks, or until your doctor tells you it is okay. If you have a drain near your incision, follow your doctor's instructions. ? · You may drive when you are no longer taking pain medicine and can quickly move your foot from the gas pedal to the brake. You must also be able to sit comfortably for a long period of time, even if you do not plan on going far. You might get caught in traffic. ? · You will probably be able to go back to work in 1 to 3 weeks. If you had an open surgery, it may take 3 to 4 weeks. ? · Your doctor will tell you when you can have sex again. Diet ? · You can eat your normal diet. If your stomach is upset, try bland, low-fat foods like plain rice, broiled chicken, toast, and yogurt. ? · Drink plenty of fluids (unless your doctor tells you not to). ? · You may notice that your bowel movements are not regular right after your surgery. This is common. Try to avoid constipation and straining with bowel movements. You may want to take a fiber supplement every day. If you have not had a bowel movement after a couple of days, ask your doctor about taking a mild laxative. Medicines ? · Your doctor will tell you if and when you can restart your medicines. He or she will also give you instructions about taking any new medicines. ? · If you take blood thinners, such as warfarin (Coumadin), clopidogrel (Plavix), or aspirin, be sure to talk to your doctor. He or she will tell you if and when to start taking those medicines again. Make sure that you understand exactly what your doctor wants you to do. ? · If your appendix ruptured, you will need to take antibiotics. Take them as directed. Do not stop taking them just because you feel better. You need to take the full course of antibiotics. ? · Be safe with medicines. Take pain medicines exactly as directed. ¨ If the doctor gave you a prescription medicine for pain, take it as prescribed. ¨ If you are not taking a prescription pain medicine, take an over-the-counter medicine such as acetaminophen (Tylenol), ibuprofen (Advil, Motrin), or naproxen (Aleve). Read and follow all instructions on the label. ¨ Do not take two or more pain medicines at the same time unless the doctor told you to. Many pain medicines have acetaminophen, which is Tylenol. Too much Tylenol can be harmful. ? · If you think your pain medicine is making you sick to your stomach: 
¨ Take your medicine after meals (unless your doctor has told you not to). ¨ Ask your doctor for a different pain medicine. Incision care ? · If you had an open surgery, you may have staples in your incision. The doctor will take these out in 7 to 10 days. ? · If you have strips of tape on the incision, leave the tape on for a week or until it falls off.  
? · You may wash the area with warm, soapy water 24 to 48 hours after your surgery, unless your doctor tells you not to. Pat the area dry. ? · Keep the area clean and dry. You may cover it with a gauze bandage if it weeps or rubs against clothing. Change the bandage every day. ? · If your appendix ruptured, you may have an incision with packing in it. Change the packing as often as your doctor tells you to. ¨ Packing changes may hurt at first. Taking pain medicine about half an hour before you change the dressing can help. ¨ If your dressing sticks to your wound, try soaking it with warm water for about 10 minutes before you remove it. You can do this in the shower or by placing a wet washcloth over the dressing. ¨ Remove the old packing and flush the incision with water. Gently pat the top area dry. ¨ The size of the incision determines how much gauze you need to put inside. Fold the gauze over once, but do not wad it up so that it hurts. Put it in the wound carefully. You want to keep the sides of the wound from touching. A cotton swab may help you push the gauze in as needed. ¨ Put a gauze pad over the wound, and tape it down. ¨ You may notice greenish gray fluid seeping from your wound as you start to heal. This is normal. It is a sign that your wound is healing. Follow-up care is a key part of your treatment and safety. Be sure to make and go to all appointments, and call your doctor if you are having problems. It's also a good idea to know your test results and keep a list of the medicines you take. When should you call for help? Call 911 anytime you think you may need emergency care. For example, call if: 
? · You passed out (lost consciousness). ? · You are short of breath. Adam Shillings ? Call your doctor now or seek immediate medical care if: 
? · You are sick to your stomach and cannot drink fluids. ? · You cannot pass stools or gas. ? · You have pain that does not get better when you take your pain medicine. ? · You have signs of infection, such as: 
¨ Increased pain, swelling, warmth, or redness. ¨ Red streaks leading from the wound. ¨ Pus draining from the wound. ¨ A fever. ? · You have loose stitches, or your incision comes open. ? · Bright red blood has soaked through the bandage over your incision. ? · You have signs of a blood clot in your leg (called a deep vein thrombosis), such as: 
¨ Pain in your calf, back of knee, thigh, or groin. ¨ Redness and swelling in your leg or groin. ? Watch closely for any changes in your health, and be sure to contact your doctor if you have any problems. Where can you learn more? Go to http://helen.info/. Enter N363 in the search box to learn more about \"Appendectomy: What to Expect at Home. \" Current as of: May 12, 2017 Content Version: 11.4 © 0643-1892 [employer] Care instructions adapted under license by [employer] (which disclaims liability or warranty for this information). If you have questions about a medical condition or this instruction, always ask your healthcare professional. [place] [address] any warranty or liability for your use of this information. Dr. Florencio Arce Discharge Instructions Patient: Judith Hammonds MRN: [id number]  SSN: [id number] YOB: 1965  Age: 47 y.o. Sex: female Activity · As tolerated, walking encouraged, stairs are okay · Avoid strenuous activities - no lifting anything heavier than ten pounds. · You may shower at home, do not soak in a tub. Diet · Regular diet after nausea from the anesthetic has passed. · If nausea and vomiting continues, call your doctor Pain · Take pain medication as directed by your doctor ·  Call your doctor if pain is NOT relieved by medication · DO NOT take aspirin or blood thinners until 48 hours after surgery unless directed by your doctor Follow-Up Phone Calls · Call [phone] today to make your follow-up appointment. · If you have any problems, call your doctor as needed Call your doctor if 
· Excessive bleeding that does not stop after holding mild pressure over the area · Temperature of 101 degrees F or above · Redness,excessive swelling or bruising, and/or green or yellow, smelly discharge from incision After Anesthesia · For the first 24 hours: DO NOT Drive, Drink alcoholic beverages, or make important decisions · Be aware of dizziness following anesthesia and while taking pain medication Medication changes may have been made by your doctor; see Kelsey Tamez form.  Continue home medications as previously prescribed unless instructed by your doctor. Other Instructions: Call the office to schedule a follow-up appointment Appointment date/time: 10 to 14 days Your doctor's phone number: [phone]. [employer] Announcement We are excited to announce that we are making your provider's discharge notes available to you in [employer]. You will see these notes when they are completed and signed by the physician that discharged you from your recent hospital stay. If you have any questions or concerns about any information you see in [employer], please call the Health Information Department where you were seen or reach out to your Primary Care Provider for more information about your plan of care. Introducing [facility]! [facility] introduces [employer] patient portal. Now you can access parts of your medical record, email your doctor's office, and request medication refills online. 1. In your internet browser, go to [email or web address] 2. Click on the First Time User? Click Here link in the Sign In box. You will see the New Member Sign Up page. 3. Enter your [employer] Access Code exactly as it appears below. You will not need to use this code after youve completed the sign-up process. If you do not sign up before the expiration date, you must request a new code. · [employer] Access Code: [email or web address] Expires: 2/13/2018  9:57 AM 
 
4. Enter the last four digits of your Social Security Number (xxxx) and Date of Birth (mm/dd/yyyy) as indicated and click Submit. You will be taken to the next sign-up page. 5. Create a [employer] ID. This will be your [employer] login ID and cannot be changed, so think of one that is secure and easy to remember. 6. Create a [employer] password. You can change your password at any time. 7. Enter your Password Reset Question and Answer. This can be used at a later time if you forget your password. 8. Enter your e-mail address. You will receive e-mail notification when new information is available in [address] 9. Click Sign Up. You can now view and download portions of your medical record. 10. Click the Download Summary menu link to download a portable copy of your medical information. If you have questions, please visit the Frequently Asked Questions section of the [employer] website. Remember, [employer] is NOT to be used for urgent needs. For medical emergencies, dial 911. Now available from your iPhone and Android! Providers Seen During Your Hospitalization Provider Specialty Primary office phone Bashir Rodgers MD Emergency Medicine [phone] Darleen Claude, MD General Surgery [phone] Your Primary Care Physician (PCP) Primary Care Physician Office Phone Office Fax UNKNOWN, PROVIDER ** None ** ** None ** You are allergic to the following Allergen Reactions Clindamycin Swelling Iodinated Contrast- Oral And Iv Dye Hives Recent Documentation Height Weight Breastfeeding? BMI OB Status Smoking Status 1.727 m 63.5 kg No 21.29 kg/m2 Perimenopausal Never Smoker Emergency Contacts Name Discharge Info Relation Home Work Mobile Kelin Ibrahim DISCHARGE CAREGIVER [3] Mother [14]   [phone] Patient Belongings The following personal items are in your possession at time of discharge: 
  Dental Appliances: None  Visual Aid: Glasses      Home Medications: None   Jewelry: None  Clothing: At bedside    Other Valuables: Cell Phone Discharge Instructions Attachments/References HYDROCODONE/ACETAMINOPHEN (BY MOUTH) (ENGLISH) Patient Handouts Hydrocodone/Acetaminophen (By mouth) Acetaminophen (n-twox-q-MIN-oh-fen), Hydrocodone Bitartrate (cuz-daml-QTC-done bye-TAR-trate) Treats pain. This medicine contains a narcotic pain reliever. Brand Name(s): Hycet, Lorcet, Lorcet HD, Lorcet Plus, Lortab 10/325, Lortab 5/325, Lortab 7.5/325, Lortab Elixir, Norco, Verdrocet, Vicodin, Vicodin ES, Vicodin HP, Xodol, Xodol 5/300 There may be other brand names for this medicine. When This Medicine Should Not Be Used: This medicine is not right for everyone. Do not use it if you had an allergic reaction to acetaminophen, hydrocodone, or other narcotic medicines, or stomach or bowel blockage (including paralytic ileus). How to Use This Medicine:  
Capsule, Liquid, Tablet · Your doctor will tell you how much medicine to use. Do not use more than directed. · An overdose can be dangerous. Follow directions carefully so you do not get too much medicine at one time. · Oral liquid: Measure the oral liquid medicine with a marked measuring spoon, oral syringe, or medicine cup. · Drink plenty of liquids to help avoid constipation. · This medicine should come with a Medication Guide. Ask your pharmacist for a copy if you do not have one. · Missed dose: Take a dose as soon as you remember. If it is almost time for your next dose, wait until then and take a regular dose. Do not take extra medicine to make up for a missed dose. · Store the medicine in a closed container at room temperature, away from heat, moisture, and direct light. Flush any unused Norco® tablets down the toilet. Drugs and Foods to Avoid: Ask your doctor or pharmacist before using any other medicine, including over-the-counter medicines, vitamins, and herbal products. · Do not use this medicine if you are using or have used an MAO inhibitor within the past 14 days. · Some medicines can affect how hydrocodone/acetaminophen works. Tell your doctor if you are using any of the following: ¨ Carbamazepine, erythromycin, ketoconazole, mirtazapine, phenytoin, rifampin, ritonavir, tramadol, trazodone ¨ Diuretic (water pill) ¨ Medicine to treat depression or mental health problems ¨ Medicine to treat migraine headaches ¨ Phenothiazine medicine · Tell your doctor if you use anything else that makes you sleepy. Some examples are allergy medicine, narcotic pain medicine, and alcohol. Tell your doctor if you are using buprenorphine, butorphanol, nalbuphine, pentazocine, or a muscle relaxer. · Do not drink alcohol while you are using this medicine. Acetaminophen can damage your liver, and your risk is higher if you also drink alcohol. Warnings While Using This Medicine: · Tell your doctor if you are pregnant or breastfeeding, or if you have kidney disease, liver disease, lung or breathing problems, gallbladder or pancreas problems, an underactive thyroid, Cooke disease, prostate problems, trouble urinating, stomach problems, or a history of head injury or brain tumor, seizures, alcohol or drug addiction. · This medicine may cause the following problems:  
¨ High risk of overdose, which can lead to death ¨ Respiratory depression (serious breathing problem that can be life-threatening) ¨ Liver problems ¨ Serious skin reactions ¨ Serotonin syndrome (when used with certain medicines) · This medicine can be habit-forming. Do not use more than your prescribed dose. Call your doctor if you think your medicine is not working. · This medicine may make you dizzy or drowsy. Do not drive or doing anything else that could be dangerous until you know how this medicine affects you. · This medicine contains acetaminophen. Read the labels of all other medicines you are using to see if they also contain acetaminophen, or ask your doctor or pharmacist. Yelena Jacobson not use more than 4 grams (4,000 milligrams) total of acetaminophen in one day. · Tell any doctor or dentist who treats you that you are using this medicine. This medicine may affect certain medical test results. · This medicine may cause constipation, especially with long-term use.  Ask your doctor if you should use a laxative to prevent and treat constipation. · This medicine could cause infertility. Talk with your doctor before using this medicine if you plan to have children. · Keep all medicine out of the reach of children. Never share your medicine with anyone. Possible Side Effects While Using This Medicine:  
Call your doctor right away if you notice any of these side effects: · Allergic reaction: Itching or hives, swelling in your face or hands, swelling or tingling in your mouth or throat, chest tightness, trouble breathing · Anxiety, restlessness, fast heartbeat, fever, sweating, muscle spasms, twitching, diarrhea, seeing or hearing things that are not there · Blistering, peeling, red skin rash · Blue lips, fingernails, or skin · Dark urine or pale stools, loss of appetite, nausea or vomiting, stomach pain, yellow skin or eyes · Extreme weakness, shallow breathing, slow heartbeat, sweating, seizures, cold or clammy skin · Lightheadedness, dizziness, fainting If you notice these less serious side effects, talk with your doctor: · Constipation, nausea, vomiting · Tiredness or sleepiness If you notice other side effects that you think are caused by this medicine, tell your doctor. Call your doctor for medical advice about side effects. You may report side effects to FDA at 1-848-FDA-7529 © 2017 [address] Information is for End User's use only and may not be sold, redistributed or otherwise used for commercial purposes. The above information is an  only. It is not intended as medical advice for individual conditions or treatments. Talk to your doctor, nurse or pharmacist before following any medical regimen to see if it is safe and effective for you. Please provide this summary of care documentation to your next provider. Signatures-by signing, you are acknowledging that this After Visit Summary has been reviewed with you and you have received a copy. Patient Signature:  ____________________________________________________________ Date:  ____________________________________________________________  
  
Aloma Snellen Provider Signature:  ____________________________________________________________ Date:  ____________________________________________________________

## 2017-11-14 NOTE — PERIOP NOTES
Received care of PT in stretcher. A little anxious, appeared somewhat confused. Asking \"where am I? \" PT reassured and constant reorientation given. Denies pain. Vital signs stable, no respiratory distress noted, on monitor. Will continue to monitor. 1550  TRANSFER - OUT REPORT:    Verbal report given to YUDELKA Wyatt on CARSON Portillo  being transferred to 2200(unit) for routine post - op       Report consisted of patients Situation, Background, Assessment and   Recommendations(SBAR). Information from the following report(s) SBAR, Kardex, OR Summary, MAR and Cardiac Rhythm sinus rhythm was reviewed with the receiving nurse. Lines:   Peripheral IV 11/14/17 Left Antecubital (Active)   Site Assessment Clean, dry, & intact 11/14/2017  2:27 PM   Phlebitis Assessment 0 11/14/2017  2:27 PM   Infiltration Assessment 0 11/14/2017  2:27 PM   Dressing Status Clean, dry, & intact 11/14/2017  2:27 PM   Dressing Type Transparent;Tape 11/14/2017  2:27 PM   Hub Color/Line Status Green; Infusing 11/14/2017  2:27 PM   Action Taken Open ports on tubing capped 11/14/2017  2:27 PM   Alcohol Cap Used Yes 11/14/2017  2:27 PM        Opportunity for questions and clarification was provided.       Patient transported with:   [facility]

## 2017-11-14 NOTE — PROGRESS NOTES
1604: Received patient from PACU, patient denies pain. Educated on use of call bell, white board updated, safety measures in place. 1741: Patient sitting up in bed, family at bedside, patient in good spirits, patient rates pain 0/10, denies nausea, safety measures in place. 1818: Patient assisted to bathroom, voided 500ml of clear yellow urine without difficulty. Returned safely to bed. Bedside shift change report given to Mary Ruffin RN (oncoming nurse) by Lyssa Griffith RN (offgoing nurse). Report included the following information SBAR, Kardex, OR Summary, Intake/Output, MAR and Recent Results.

## 2017-11-15 VITALS
BODY MASS INDEX: 21.22 KG/M2 | HEART RATE: 87 BPM | TEMPERATURE: 97.8 F | WEIGHT: 140 LBS | RESPIRATION RATE: 16 BRPM | OXYGEN SATURATION: 99 % | DIASTOLIC BLOOD PRESSURE: 79 MMHG | SYSTOLIC BLOOD PRESSURE: 123 MMHG | HEIGHT: 68 IN

## 2017-11-15 LAB
ANION GAP SERPL CALC-SCNC: 7 MMOL/L (ref 3–18)
BUN SERPL-MCNC: 3 MG/DL (ref 7–18)
BUN/CREAT SERPL: 5 (ref 12–20)
CALCIUM SERPL-MCNC: 8 MG/DL (ref 8.5–10.1)
CHLORIDE SERPL-SCNC: 107 MMOL/L (ref 100–108)
CO2 SERPL-SCNC: 26 MMOL/L (ref 21–32)
CREAT SERPL-MCNC: 0.63 MG/DL (ref 0.6–1.3)
ERYTHROCYTE [DISTWIDTH] IN BLOOD BY AUTOMATED COUNT: 12.6 % (ref 11.6–14.5)
GLUCOSE SERPL-MCNC: 100 MG/DL (ref 74–99)
HCT VFR BLD AUTO: 34.9 % (ref 35–45)
HGB BLD-MCNC: 11.7 G/DL (ref 12–16)
MCH RBC QN AUTO: 30.5 PG (ref 24–34)
MCHC RBC AUTO-ENTMCNC: 33.5 G/DL (ref 31–37)
MCV RBC AUTO: 91.1 FL (ref 74–97)
PLATELET # BLD AUTO: 182 K/UL (ref 135–420)
PMV BLD AUTO: 9.1 FL (ref 9.2–11.8)
POTASSIUM SERPL-SCNC: 3.7 MMOL/L (ref 3.5–5.5)
RBC # BLD AUTO: 3.83 M/UL (ref 4.2–5.3)
SODIUM SERPL-SCNC: 140 MMOL/L (ref 136–145)
WBC # BLD AUTO: 8.2 K/UL (ref 4.6–13.2)

## 2017-11-15 PROCEDURE — 74011000258 HC RX REV CODE- 258: Performed by: SURGERY

## 2017-11-15 PROCEDURE — 74011250637 HC RX REV CODE- 250/637: Performed by: SURGERY

## 2017-11-15 PROCEDURE — 77030020245 HC SOL INJ 5% D/0.9%NACL

## 2017-11-15 PROCEDURE — 85027 COMPLETE CBC AUTOMATED: CPT | Performed by: SURGERY

## 2017-11-15 PROCEDURE — 80048 BASIC METABOLIC PNL TOTAL CA: CPT | Performed by: SURGERY

## 2017-11-15 PROCEDURE — 36415 COLL VENOUS BLD VENIPUNCTURE: CPT | Performed by: SURGERY

## 2017-11-15 PROCEDURE — 99218 HC RM OBSERVATION: CPT

## 2017-11-15 RX ORDER — HYDROCODONE BITARTRATE AND ACETAMINOPHEN 5; 325 MG/1; MG/1
1-2 TABLET ORAL
Qty: 20 TAB | Refills: 0 | Status: SHIPPED | OUTPATIENT
Start: 2017-11-15 | End: 2017-11-16 | Stop reason: SINTOL

## 2017-11-15 RX ADMIN — HYDROCODONE BITARTRATE AND ACETAMINOPHEN 2 TABLET: 5; 325 TABLET ORAL at 01:25

## 2017-11-15 RX ADMIN — DEXTROSE MONOHYDRATE AND SODIUM CHLORIDE 100 ML/HR: 5; .45 INJECTION, SOLUTION INTRAVENOUS at 01:25

## 2017-11-15 RX ADMIN — ESCITALOPRAM OXALATE 10 MG: 10 TABLET ORAL at 08:38

## 2017-11-15 RX ADMIN — HYDROCODONE BITARTRATE AND ACETAMINOPHEN 2 TABLET: 5; 325 TABLET ORAL at 07:18

## 2017-11-15 NOTE — PROGRESS NOTES
Received bedside shift report from Marian Trujillo RN. Pt is resting in bed, off going nurse administered PRN Norco. White board updated, call bell within reach. 0835 Pt resting in bed, active bowel sounds. NAD noted, no nausea/vomiting reported. Pt states that she is feeling sore today. 1102 Pt complaining of headache. Administered ice pack for neck, pt states that she just wants to go home and lay in her bed. Educated pt on need to stay hydrated and when she can have her pain medication again.

## 2017-11-15 NOTE — PROGRESS NOTES
Certified Lyndsay Molina provider rounded on Elana Saint to provide education related to sleep apnea after chart review for risk factors. Risk factors include:  1. Depression  2. Anxiety    Provided patient with the following pamphlets:  1. What is Sleep Apnea  2. Sleep and Medical problems  3. Sleep & Your Heart  4. Common Sleep Problems for Older Adults  5. Tips For Sleep As You Age  10. Tips For Better Sleep In Older Adults    Patient Education:  1. Reviewed sleep hygiene & effects of poor sleep quality. 2. Reviewed relationship between sleep & heart health. 3. Reviewed relationship between sleep & age. 4. Reviewed relationship between sleep & weight. Recommendations:  1. Referral to sleep specialist for evaluation if symptoms of poor sleep quality present. 2. Order baseline PSG testing to be arranged as an outpatient. 3. Follow up with sleep specialist for treatment and management.

## 2017-11-15 NOTE — DISCHARGE INSTRUCTIONS
Appendectomy: What to Expect at Home  Your Recovery    Your doctor removed your appendix either by making many small cuts, called incisions, in your belly (laparoscopic surgery) or through open surgery. In open surgery, the doctor makes one large incision. The incisions leave scars that usually fade over time. After your surgery, it is normal to feel weak and tired for several days after you return home. Your belly may be swollen and may be painful. If you had laparoscopic surgery, you may have pain in your shoulder for about 24 hours. You may also feel sick to your stomach and have diarrhea, constipation, gas, or a headache. This usually goes away in a few days. Your recovery time depends on the type of surgery you had. If you had laparoscopic surgery, you will probably be able to return to work or a normal routine 1 to 3 weeks after surgery. If you had an open surgery, it may take 2 to 4 weeks. If your appendix ruptured, you may have a drain in your incision. Your body will work fine without an appendix. You will not have to make any changes in your diet or lifestyle. This care sheet gives you a general idea about how long it will take for you to recover. But each person recovers at a different pace. Follow the steps below to get better as quickly as possible. How can you care for yourself at home? Activity  ? · Rest when you feel tired. Getting enough sleep will help you recover. ? · Try to walk each day. Start by walking a little more than you did the day before. Bit by bit, increase the amount you walk. Walking boosts blood flow and helps prevent pneumonia and constipation. ? · For about 2 weeks, avoid lifting anything that would make you strain. This may include a child, heavy grocery bags and milk containers, a heavy briefcase or backpack, cat litter or dog food bags, or a vacuum .    ? · Avoid strenuous activities, such as bicycle riding, jogging, weight lifting, or aerobic exercise, until your doctor says it is okay. ? · You may be able to take showers (unless you have a drain near your incision) 24 to 48 hours after surgery. Pat the incision dry. Do not take a bath for the first 2 weeks, or until your doctor tells you it is okay. If you have a drain near your incision, follow your doctor's instructions. ? · You may drive when you are no longer taking pain medicine and can quickly move your foot from the gas pedal to the brake. You must also be able to sit comfortably for a long period of time, even if you do not plan on going far. You might get caught in traffic. ? · You will probably be able to go back to work in 1 to 3 weeks. If you had an open surgery, it may take 3 to 4 weeks. ? · Your doctor will tell you when you can have sex again. Diet  ? · You can eat your normal diet. If your stomach is upset, try bland, low-fat foods like plain rice, broiled chicken, toast, and yogurt. ? · Drink plenty of fluids (unless your doctor tells you not to). ? · You may notice that your bowel movements are not regular right after your surgery. This is common. Try to avoid constipation and straining with bowel movements. You may want to take a fiber supplement every day. If you have not had a bowel movement after a couple of days, ask your doctor about taking a mild laxative. Medicines  ? · Your doctor will tell you if and when you can restart your medicines. He or she will also give you instructions about taking any new medicines. ? · If you take blood thinners, such as warfarin (Coumadin), clopidogrel (Plavix), or aspirin, be sure to talk to your doctor. He or she will tell you if and when to start taking those medicines again. Make sure that you understand exactly what your doctor wants you to do. ? · If your appendix ruptured, you will need to take antibiotics. Take them as directed. Do not stop taking them just because you feel better. You need to take the full course of antibiotics. ? · Be safe with medicines. Take pain medicines exactly as directed. ¨ If the doctor gave you a prescription medicine for pain, take it as prescribed. ¨ If you are not taking a prescription pain medicine, take an over-the-counter medicine such as acetaminophen (Tylenol), ibuprofen (Advil, Motrin), or naproxen (Aleve). Read and follow all instructions on the label. ¨ Do not take two or more pain medicines at the same time unless the doctor told you to. Many pain medicines have acetaminophen, which is Tylenol. Too much Tylenol can be harmful. ? · If you think your pain medicine is making you sick to your stomach:  ¨ Take your medicine after meals (unless your doctor has told you not to). ¨ Ask your doctor for a different pain medicine. Incision care  ? · If you had an open surgery, you may have staples in your incision. The doctor will take these out in 7 to 10 days. ? · If you have strips of tape on the incision, leave the tape on for a week or until it falls off.   ? · You may wash the area with warm, soapy water 24 to 48 hours after your surgery, unless your doctor tells you not to. Pat the area dry. ? · Keep the area clean and dry. You may cover it with a gauze bandage if it weeps or rubs against clothing. Change the bandage every day. ? · If your appendix ruptured, you may have an incision with packing in it. Change the packing as often as your doctor tells you to. ¨ Packing changes may hurt at first. Taking pain medicine about half an hour before you change the dressing can help. ¨ If your dressing sticks to your wound, try soaking it with warm water for about 10 minutes before you remove it. You can do this in the shower or by placing a wet washcloth over the dressing. ¨ Remove the old packing and flush the incision with water. Gently pat the top area dry. ¨ The size of the incision determines how much gauze you need to put inside.  Fold the gauze over once, but do not wad it up so that it hurts. Put it in the wound carefully. You want to keep the sides of the wound from touching. A cotton swab may help you push the gauze in as needed. ¨ Put a gauze pad over the wound, and tape it down. ¨ You may notice greenish gray fluid seeping from your wound as you start to heal. This is normal. It is a sign that your wound is healing. Follow-up care is a key part of your treatment and safety. Be sure to make and go to all appointments, and call your doctor if you are having problems. It's also a good idea to know your test results and keep a list of the medicines you take. When should you call for help? Call 911 anytime you think you may need emergency care. For example, call if:  ? · You passed out (lost consciousness). ? · You are short of breath. Peggyann Gang ? Call your doctor now or seek immediate medical care if:  ? · You are sick to your stomach and cannot drink fluids. ? · You cannot pass stools or gas. ? · You have pain that does not get better when you take your pain medicine. ? · You have signs of infection, such as:  ¨ Increased pain, swelling, warmth, or redness. ¨ Red streaks leading from the wound. ¨ Pus draining from the wound. ¨ A fever. ? · You have loose stitches, or your incision comes open. ? · Bright red blood has soaked through the bandage over your incision. ? · You have signs of a blood clot in your leg (called a deep vein thrombosis), such as:  ¨ Pain in your calf, back of knee, thigh, or groin. ¨ Redness and swelling in your leg or groin. ? Watch closely for any changes in your health, and be sure to contact your doctor if you have any problems. Where can you learn more? Go to http://maryjo-katalina.info/. Enter T343 in the search box to learn more about \"Appendectomy: What to Expect at Home. \"  Current as of: May 12, 2017  Content Version: 11.4  © 7554-1044 Healthwise, [employer].  Care instructions adapted under license by [employer] (which disclaims liability or warranty for this information). If you have questions about a medical condition or this instruction, always ask your healthcare professional. [place] [address] any warranty or liability for your use of this information. Dr. Yolanda Negron Discharge Instructions     Patient: Edward Holman MRN: [id number]  SSN: [id number]    YOB: 1965  Age: 47 y.o. Sex: female      Activity  · As tolerated, walking encouraged, stairs are okay  · Avoid strenuous activities - no lifting anything heavier than ten pounds. · You may shower at home, do not soak in a tub. Diet  · Regular diet after nausea from the anesthetic has passed. · If nausea and vomiting continues, call your doctor    Pain  · Take pain medication as directed by your doctor  ·  Call your doctor if pain is NOT relieved by medication  · DO NOT take aspirin or blood thinners until 48 hours after surgery unless directed by your doctor    Follow-Up Phone Calls  · Call [phone] today to make your follow-up appointment. · If you have any problems, call your doctor as needed    Call your doctor if  · Excessive bleeding that does not stop after holding mild pressure over the area  · Temperature of 101 degrees F or above  · Redness,excessive swelling or bruising, and/or green or yellow, smelly discharge from incision    After Anesthesia  · For the first 24 hours: DO NOT Drive, Drink alcoholic beverages, or make important decisions  · Be aware of dizziness following anesthesia and while taking pain medication    Medication changes may have been made by your doctor; see Kia Lanius form. Continue home medications as previously prescribed unless instructed by your doctor. Other Instructions: Call the office to schedule a follow-up appointment    Appointment date/time: 10 to 14 days    Your doctor's phone number: [phone].

## 2017-11-15 NOTE — PROGRESS NOTES
Rajni   Discharge Planning/ Assessment    Reasons for Intervention: Spoke with pt, lives with 7y/o son, her mom is watching him while she is here. independent with adls and amb. Employed, insured. pcp dr Korey Rocha. Designates her mom for dcp. obs letter given, copy to chart. plan home.     High Risk Criteria  [] Yes  [x]No   Physician Referral  [] Yes  [x]No        Date    Nursing Referral  [] Yes  [x]No        Date    Patient/Family Request  [] Yes  [x]No        Date       Resources:    Medicare  [] Yes  []No   Medicaid  [] Yes  []No   No Resources  [] Yes  []No   Private Insurance  [x] Yes  []No    Name/Phone Number    Other  [] Yes  []No        (i.e. Workman's Comp)         Prior Services:    Prior Services  [] Yes  [x]No   Home Health  [] Yes  []No   [address]  [] Yes  []No        Number of [address]  [] Yes  []No       Meals on Wheels  [] Yes  []No   Office on Aging  [] Yes  []No   Transportation Services  [] Yes  []No   Nursing Home  [] Yes  []No        Nursing Home Name    [address]  [] Yes  []No        [address] Name    Other       Information Source:      Information obtained from  [x] Patient  [] Parent   [] [address]  [] Child  [] Spouse   [] Significant Other/Partner   [] Friend      [] EMS    [] Nursing Home Chart          [] Other:   Chart Review  [] Yes  []No     Family/Support System:    Patient lives with  [] Alone    [] Spouse   [] Significant Other  [x] Children  [] Caretaker   [] Parent  [] Sibling     [] Other       Other Support System:    Is the patient responsible for care of others  [x] Yes  []No   Information of person caring for patient on  discharge    Managers financial affairs independently  [x] Yes  []No   If no, explain:      Status Prior to Admission:    Mental Status  [] Awake  [] Alert  [x] Oriented  [] Quiet/Calm [] Lethargic/Sedated   [] Disoriented  [] Restless/Anxious  [] Combative Personal Care  [] Dependent  [x] Independent Personal Care  [] Requires Assistance   Meal Preparation Ability  [x] Independent   [] Standby Assistance   [] Minimal Assistance   [] Moderate Assistance  [] Maximum Assistance     [] Total Assistance   Chores  [x] Independent with Chores   [] N/A Nursing Home Resident   [] Requires Assistance   Bowel/Bladder  [x] Continent  [] Catheter  [] Incontinent  [] Ostomy Self-Care    [] Urine Diversion Self-Care  [] Maximum Assistance     [] Total Assistance   Number of Persons needed for assistance    DME at home  [] [address], [place]  [] [address], Straight   [] Commode    [] Bathroom/Grab Bars  [] Hospital Bed  [] Nebulizer  [] Oxygen           [] Raised Toilet Seat  [] Shower Chair  [] Side Rails for Bed   [] Tub Transfer Bench   [] Jasmin Sun  [] Hugh Edmonds, Standard      [] Other:   Vendor      Treatment Presently Receiving:    Current Treatments  [] Chemotherapy  [] Dialysis  [] Insulin  [] IVAB [] IVF   [] O2  [] PCA   [] PT   [] RT   [] Tube Feedings   [] Wound Care     Psychosocial Evaluation:    Verbalized Knowledge of Disease Process  [x] Patient  []Family   Coping with Disease Process  [] Patient  []Family   Requires Further Counseling Coping with Disease Process  [] Patient  []Family     Identified Projected Needs:    Home Health Aid  [] Yes  []No   Transportation  [] Yes  []No   Education  [] Yes  []No        Specific Education     Financial Counseling  [] Yes  []No   Inability to Care for Self/Will Require 24 hour care  [] Yes  []No   Pain Management  [] Yes  []No   Home Infusion Therapy  [] Yes  []No   Oxygen Therapy  [] Yes  []No   DME  [] Yes  []No   Long Term Care Placement  [] Yes  []No   Rehab  [] Yes  []No   Physical Therapy  [] Yes  []No   Needs Anticipated At This Time  [] Yes  [x]No     Intra-Hospital Referral:    [facility]  [] Yes  [x]No     [] Yes  [x]No   Patient Representative  [] Yes  [x]No   Staff for Teaching Needs  [] Yes  [x]No Specialty Teaching Needs     Diabetic Educator  [] Yes  [x]No   Referral for Diabetic Educator Needed  [] Yes  [x]No  If Yes, place order for Nutritionist or Diabetic Consult     Tentative Discharge Plan:    Home with No Services  [x] Yes  []No   Home with [address]  [] Yes  []No        If Yes, specify type    [address]  [] Yes  []No        If Yes, specify type    Meals on Wheels  [] Yes  []No   Office of Aging  [] Yes  []No   NHP  [] Yes  []No   Return to the [address]  [] Yes  []No   Rehab Therapy  [] Yes  []No   Acute Rehab  [] Yes  []No   Subacute Rehab  [] Yes  []No   Private Care  [] Yes  []No   Substance Abuse Referral  [] Yes  []No   Transportation  [] Yes  []No   Chore Service  [] Yes  []No   Inpatient Hospice  [] Yes  []No   OP RT  [] Yes  [] No   OP Hemo  [] Yes  [] No   OP PT  [] Yes  []No   Support Group  [] Yes  []No   Reach to Recovery  [] Yes  []No   OP Oncology Clinic  [] Yes  []No   Clinic Appointment  [] Yes  []No   DME  [] Yes  []No   Comments    Name of D/C Planner or  Given to Patient or Family Gabby keen rn cm   Phone Number [phone]        Extension    Date 11/15/17   Time    If you are discharged home, whom do you designate to participate in your discharge plan and receive any information needed?      Enter name of designee         Phone # of designee         Address of designee         Updated         Patient refused to designate any           individual

## 2017-11-15 NOTE — PROGRESS NOTES
Patient has designated _____________mother___________ to participate in his/her discharge plan and to receive any needed information.      Name: Re Salcedo  Address:  Phone number:[phone]

## 2017-11-15 NOTE — ADDENDUM NOTE
Addendum  created 11/14/17 2041 by Ezequiel Zhang MD    Anesthesia Attestations filed, Sign clinical note

## 2017-11-15 NOTE — PROGRESS NOTES
Problem: Surgical Pathway Day of Surgery    Goal: Activity/Safety  Outcome: Progressing Towards Goal  Pt ambulated halls with assist    Goal: Nutrition/Diet  Outcome: Progressing Towards Goal  Pt tolerated liquids and denies nausea    Goal: Treatments/Interventions/Procedures  Outcome: Progressing Towards Goal  Patient Vitals for the past 12 hrs:   Temp Pulse Resp BP SpO2   11/14/17 1928 97.4 °F (36.3 °C) 92 18 112/74 96 %   11/14/17 1605 98.3 °F (36.8 °C) 85 15 112/70 96 %   11/14/17 1550 - 86 17 - 99 %   11/14/17 1547 - 86 16 - 99 %   11/14/17 1543 - 85 15 104/62 99 %   11/14/17 1528 - 85 16 109/62 99 %   11/14/17 1523 - 83 16 107/60 99 %   11/14/17 1518 - 91 13 110/64 99 %   11/14/17 1513 - 84 17 111/63 98 %   11/14/17 1508 - 86 16 110/60 98 %   11/14/17 1453 - 85 15 115/61 96 %   11/14/17 1421 97.9 °F (36.6 °C) 98 22 118/63 100 %   11/14/17 1418 - 99 20 - 100 %   11/14/17 1417 - - - 118/63 -   11/14/17 1248 97.7 °F (36.5 °C) 77 16 125/78 100 %   11/14/17 1200 - 73 - 124/74 100 %   11/14/17 1145 - 74 - 115/64 100 %   11/14/17 1130 - 72 - 116/65 100 %   11/14/17 1030 - 72 16 113/64 99 %   11/14/17 1015 - 72 14 120/67 100 %     Goal: *Optimal pain control at patient's stated goal  Outcome: Progressing Towards Goal  Pt reports pain well controlled    Goal: *Adequate urinary output (equal to or greater than 30 milliliters/hour)  Ambulatory Surgery patients voiding without difficulty. Outcome: Progressing Towards Goal  Voiding without difficulty    Problem: Falls - Risk of    Goal: *Absence of Falls  Document Hawk Fall Risk and appropriate interventions in the flowsheet.    Outcome: Progressing Towards Goal    Fall Risk Interventions:  Mobility Interventions: Patient to call before getting OOB    Mentation Interventions: Door open when patient unattended    Medication Interventions: Patient to call before getting OOB, Teach patient to arise slowly    Elimination Interventions: Patient to call for help with toileting needs, Toileting schedule/hourly rounds, Call light in reach    History of Falls Interventions: Door open when patient unattended

## 2017-11-15 NOTE — DISCHARGE SUMMARY
General Surgery Discharge Note    Admission Date: 11/14/2017    Discharge Date:  11/15/17    Admission Diagnosis:  Acute appendicitis    Discharge Diagnosis:  Same     Procedures:   Laparoscopic appendectomy    Postop Complications: none    Hospital Course:  Patient was admitted on 11/14/2017 for acute appendicitis and taken to the operating room for laparoscopic appendectomy. Operation was without significant complication. Patient admitted to the floor postoperatively, monitored as per protocol. Diet sequentially advanced beginning POD 1, pain medications transitioned to oral during the hospital course. At the time of discharge the patient is afebrile, vital signs stable,  tolerating a diet, voiding spontaneously, ambulatory with adequate pain control with oral medications and clear surgical sites without evidence of infection. Discharge Diet:  Regular Diet    Discharge Medications:  As per medication reconciliation    Local wound care with daily showers, keep wounds clean and dry    Activity: as desired, no lifting greater than 15lbs or situps for 30 days    Special Instructions:   No driving until activity is not influenced by incisional pain and off narcotics   No bath or hot tub until wounds are healed   Notify [facility] for a fever>101, redness or foul-smelling  drainage from incision, or worsening pain. Followup with surgeon in 10-14 days.

## 2017-11-15 NOTE — PROGRESS NOTES
Care Management Interventions  PCP Verified by CM: Yes  Palliative Care Criteria Met (RRAT>21 & CHF Dx)?: No  Mode of Transport at Discharge: Other (see comment)  Transition of Care Consult (CM Consult): Discharge Planning  Discharge Durable Medical Equipment: No  Physical Therapy Consult: No  Occupational Therapy Consult: No  Speech Therapy Consult: No  Current Support Network:  Other  Confirm Follow Up Transport: Family  Plan discussed with Pt/Family/Caregiver: Yes  Discharge Location  Discharge Placement: Home

## 2017-11-15 NOTE — PROGRESS NOTES
2050  Pt reports itching after taking Percocet. Order obtained from Dr Ghazal Gimenez for PRN Benadryl and pain meds changed to Norco    Bedside and Verbal shift change report given to Rodriguez Blanton RN by Jennifer Cohen RN. Report included the following information SBAR, Kardex, Intake/Output and MAR.

## 2017-11-16 RX ORDER — HYDROMORPHONE HYDROCHLORIDE 2 MG/1
2 TABLET ORAL
Qty: 20 TAB | Refills: 0 | Status: SHIPPED | OUTPATIENT
Start: 2017-11-16

## 2017-11-20 ENCOUNTER — OFFICE VISIT (OUTPATIENT)
Dept: SURGERY | Age: 52
End: 2017-11-20

## 2017-11-20 ENCOUNTER — HOSPITAL ENCOUNTER (OUTPATIENT)
Dept: LAB | Age: 52
Discharge: HOME | End: 2017-11-20
Payer: COMMERCIAL

## 2017-11-20 VITALS
BODY MASS INDEX: 21.22 KG/M2 | SYSTOLIC BLOOD PRESSURE: 133 MMHG | HEIGHT: 68 IN | DIASTOLIC BLOOD PRESSURE: 79 MMHG | RESPIRATION RATE: 20 BRPM | TEMPERATURE: 97.2 F | WEIGHT: 140 LBS | HEART RATE: 81 BPM

## 2017-11-20 DIAGNOSIS — K35.30 ACUTE APPENDICITIS WITH LOCALIZED PERITONITIS: ICD-10-CM

## 2017-11-20 DIAGNOSIS — K35.30 ACUTE APPENDICITIS WITH LOCALIZED PERITONITIS: Primary | ICD-10-CM

## 2017-11-20 LAB
ANION GAP SERPL CALC-SCNC: 6 MMOL/L (ref 3–18)
BASOPHILS # BLD: 0 K/UL (ref 0–0.06)
BASOPHILS NFR BLD: 1 % (ref 0–2)
BUN SERPL-MCNC: 8 MG/DL (ref 7–18)
BUN/CREAT SERPL: 13 (ref 12–20)
CALCIUM SERPL-MCNC: 9 MG/DL (ref 8.5–10.1)
CHLORIDE SERPL-SCNC: 105 MMOL/L (ref 100–108)
CO2 SERPL-SCNC: 31 MMOL/L (ref 21–32)
CREAT SERPL-MCNC: 0.62 MG/DL (ref 0.6–1.3)
DIFFERENTIAL METHOD BLD: NORMAL
EOSINOPHIL # BLD: 0.1 K/UL (ref 0–0.4)
EOSINOPHIL NFR BLD: 1 % (ref 0–5)
ERYTHROCYTE [DISTWIDTH] IN BLOOD BY AUTOMATED COUNT: 12.4 % (ref 11.6–14.5)
GLUCOSE SERPL-MCNC: 93 MG/DL (ref 74–99)
HCT VFR BLD AUTO: 38.3 % (ref 35–45)
HGB BLD-MCNC: 13.2 G/DL (ref 12–16)
LYMPHOCYTES # BLD: 1.8 K/UL (ref 0.9–3.6)
LYMPHOCYTES NFR BLD: 36 % (ref 21–52)
MCH RBC QN AUTO: 30.8 PG (ref 24–34)
MCHC RBC AUTO-ENTMCNC: 34.5 G/DL (ref 31–37)
MCV RBC AUTO: 89.5 FL (ref 74–97)
MONOCYTES # BLD: 0.4 K/UL (ref 0.05–1.2)
MONOCYTES NFR BLD: 8 % (ref 3–10)
NEUTS SEG # BLD: 2.6 K/UL (ref 1.8–8)
NEUTS SEG NFR BLD: 54 % (ref 40–73)
PLATELET # BLD AUTO: 220 K/UL (ref 135–420)
PMV BLD AUTO: 9.3 FL (ref 9.2–11.8)
POTASSIUM SERPL-SCNC: 3.9 MMOL/L (ref 3.5–5.5)
RBC # BLD AUTO: 4.28 M/UL (ref 4.2–5.3)
SODIUM SERPL-SCNC: 142 MMOL/L (ref 136–145)
WBC # BLD AUTO: 4.9 K/UL (ref 4.6–13.2)

## 2017-11-20 PROCEDURE — 36415 COLL VENOUS BLD VENIPUNCTURE: CPT | Performed by: SURGERY

## 2017-11-20 PROCEDURE — 80048 BASIC METABOLIC PNL TOTAL CA: CPT | Performed by: SURGERY

## 2017-11-20 PROCEDURE — 85025 COMPLETE CBC W/AUTO DIFF WBC: CPT | Performed by: SURGERY

## 2017-11-20 NOTE — PATIENT INSTRUCTIONS
If you have any questions or concerns about today's appointment, the verbal and/or written instructions you were given for follow up care, please call our office at [phone].     [facility] - [address]    [phone] office  [phone]

## 2017-11-20 NOTE — PROGRESS NOTES
Subjective: Kiana Palumbo is a 47 y.o. female presents for postop care 1 weeks status post lap appendectomy. Called the office earlier today. She notes that over the past 3 days she has been feeling a bit \"woozy\". No loss of consciousness, no fainting. She notes that she feels \"drugged\". She ahs not been taking percocet or norco as they both caused itching. She has been taking alleve as she has developed a neck spasm in the past few days. She is not sleeping well due to the neck spasm. She has no abdominal pain. No nausea or vomiting. She is urinating regularly. SHe is drinking well. She moved her bowel for the first time last night. She has been taking stool softeners. She notes she has been eating small amounts, but tolerating well. Objective:     Visit Vitals    /79 (BP 1 Location: Left arm, BP Patient Position: At rest)    Pulse 81    Temp 97.2 °F (36.2 °C) (Oral)    Resp 20    Ht 5' 8\" (1.727 m)    Wt 63.5 kg (140 lb)    LMP 07/14/2017 (Within Months)  Comment: pre menopuase    BMI 21.29 kg/m2       General:  alert, cooperative, no distress, appears stated age   Abdomen: soft, bowel sounds active, non-tender   Incision:   healing well, no drainage, no erythema, no hernia, no seroma, no swelling, no dehiscence, incision well approximated, moderate bruising. Pathology:acute appendicitis with serositis    Assessment:     Doing well postoperatively.   I am not sure why she is having dizziness; BP is normal, HR is normal and she is afebrile iwht a benign exam.  At this time, I will send her to get BMP/CBC and if they are normal, I will defer to her PCP for further evaluation    Plan:     - as above

## 2017-11-20 NOTE — PROGRESS NOTES
Elana Saint is a 47 y.o. female who presents today with   Chief Complaint   Patient presents with    Surgical Follow-up     Appendectomy 11/14/2017                1. Have you been to the ER, urgent care clinic since your last visit? Hospitalized since your last visit? No    2. Have you seen or consulted any other health care providers outside of the [address] [place] [address] since your last visit? Include any pap smears or colon screening.  No

## 2017-11-21 ENCOUNTER — TELEPHONE (OUTPATIENT)
Dept: SURGERY | Age: 52
End: 2017-11-21

## 2017-11-21 NOTE — TELEPHONE ENCOUNTER
----- Message from Darleen Claude, MD sent at 11/21/2017  7:55 AM EST -----  Please notify patient her labs are within normal limits

## 2017-11-30 ENCOUNTER — TELEPHONE (OUTPATIENT)
Dept: SURGERY | Age: 52
End: 2017-11-30

## 2017-11-30 NOTE — TELEPHONE ENCOUNTER
Patient called to cancel post op appt for today. Per patient, she began to feel much better the day after seeing Dr. Patrick Black for her initial post op appt. Patient's only concern that that she has a small piece of suture material or possibly a scab sticking out at one of the incision sites. Offered for patient to come in as scheduled to have the site inspected and the suture trimmed. Patient declined, stating \"I will keep an eye on it\" Encouraged patient to call with any other concerns or if suture does not fall off. Patient verbalized understanding.

## 2018-02-09 RX ORDER — FLUTICASONE PROPIONATE 50 MCG
SPRAY, SUSPENSION (ML) NASAL
Qty: 16 G | Refills: 3 | Status: SHIPPED | OUTPATIENT
Start: 2018-02-09

## 2018-02-09 NOTE — TELEPHONE ENCOUNTER
Pt was advised about her medication, also stated that she has found a PCP and she as [place] a new [place].

## 2018-04-09 RX ORDER — ESCITALOPRAM OXALATE 10 MG/1
TABLET ORAL
Qty: 90 TABLET | Refills: 0 | OUTPATIENT
Start: 2018-04-09

## 2018-05-17 ENCOUNTER — HOSPITAL ENCOUNTER (OUTPATIENT)
Dept: GENERAL RADIOLOGY | Age: 53
Discharge: HOME | End: 2018-05-17
Payer: COMMERCIAL

## 2018-05-17 DIAGNOSIS — M25.552 LEFT HIP PAIN: ICD-10-CM

## 2018-05-17 PROCEDURE — 73502 X-RAY EXAM HIP UNI 2-3 VIEWS: CPT

## 2018-06-20 RX ORDER — FLUTICASONE PROPIONATE 50 MCG
SPRAY, SUSPENSION (ML) NASAL
Qty: 16 ML | Refills: 0 | Status: SHIPPED | OUTPATIENT
Start: 2018-06-20 | End: 2019-03-15 | Stop reason: SDUPTHER

## 2018-06-20 NOTE — TELEPHONE ENCOUNTER
LVM for patient informing her that her Flonase was called in and to also remind her to establish herself with a new PCP.

## 2018-06-21 ENCOUNTER — HOSPITAL ENCOUNTER (OUTPATIENT)
Dept: ULTRASOUND IMAGING | Age: 53
Discharge: HOME | End: 2018-06-21
Attending: FAMILY MEDICINE
Payer: COMMERCIAL

## 2018-06-21 DIAGNOSIS — R10.2 PELVIC PAIN: ICD-10-CM

## 2018-06-21 DIAGNOSIS — R10.9 ABDOMINAL PAIN: ICD-10-CM

## 2018-06-21 PROCEDURE — 76700 US EXAM ABDOM COMPLETE: CPT

## 2018-06-21 PROCEDURE — 76830 TRANSVAGINAL US NON-OB: CPT

## 2018-08-28 ENCOUNTER — HOSPITAL ENCOUNTER (OUTPATIENT)
Dept: MAMMOGRAPHY | Age: 53
Discharge: HOME | End: 2018-08-28
Payer: COMMERCIAL

## 2018-08-28 ENCOUNTER — HOSPITAL ENCOUNTER (OUTPATIENT)
Dept: LAB | Age: 53
Discharge: HOME | End: 2018-08-28
Payer: COMMERCIAL

## 2018-08-28 DIAGNOSIS — Z13.9 VISIT FOR SCREENING: ICD-10-CM

## 2018-08-28 PROCEDURE — 88175 CYTOPATH C/V AUTO FLUID REDO: CPT | Performed by: OBSTETRICS & GYNECOLOGY

## 2018-08-28 PROCEDURE — 87624 HPV HI-RISK TYP POOLED RSLT: CPT | Performed by: OBSTETRICS & GYNECOLOGY

## 2018-08-28 PROCEDURE — 77063 BREAST TOMOSYNTHESIS BI: CPT

## 2018-12-08 ENCOUNTER — HOSPITAL ENCOUNTER (EMERGENCY)
Age: 53
Discharge: HOME | End: 2018-12-09
Attending: EMERGENCY MEDICINE
Payer: COMMERCIAL

## 2018-12-08 DIAGNOSIS — K21.00 GASTROESOPHAGEAL REFLUX DISEASE WITH ESOPHAGITIS: Primary | ICD-10-CM

## 2018-12-08 LAB
ALBUMIN SERPL-MCNC: 3.5 G/DL (ref 3.4–5)
ALBUMIN/GLOB SERPL: 1 {RATIO} (ref 0.8–1.7)
ALP SERPL-CCNC: 76 U/L (ref 45–117)
ALT SERPL-CCNC: 19 U/L (ref 13–56)
ANION GAP SERPL CALC-SCNC: 5 MMOL/L (ref 3–18)
AST SERPL-CCNC: 12 U/L (ref 15–37)
BASOPHILS # BLD: 0 K/UL (ref 0–0.1)
BASOPHILS NFR BLD: 0 % (ref 0–2)
BILIRUB SERPL-MCNC: 0.4 MG/DL (ref 0.2–1)
BUN SERPL-MCNC: 8 MG/DL (ref 7–18)
BUN/CREAT SERPL: 10 (ref 12–20)
CALCIUM SERPL-MCNC: 9 MG/DL (ref 8.5–10.1)
CHLORIDE SERPL-SCNC: 105 MMOL/L (ref 100–108)
CO2 SERPL-SCNC: 28 MMOL/L (ref 21–32)
CREAT SERPL-MCNC: 0.78 MG/DL (ref 0.6–1.3)
DIFFERENTIAL METHOD BLD: ABNORMAL
EOSINOPHIL # BLD: 0.1 K/UL (ref 0–0.4)
EOSINOPHIL NFR BLD: 1 % (ref 0–5)
ERYTHROCYTE [DISTWIDTH] IN BLOOD BY AUTOMATED COUNT: 12.7 % (ref 11.6–14.5)
GLOBULIN SER CALC-MCNC: 3.5 G/DL (ref 2–4)
GLUCOSE SERPL-MCNC: 89 MG/DL (ref 74–99)
HCT VFR BLD AUTO: 36.9 % (ref 35–45)
HGB BLD-MCNC: 12.7 G/DL (ref 12–16)
LIPASE SERPL-CCNC: 108 U/L (ref 73–393)
LYMPHOCYTES # BLD: 2.3 K/UL (ref 0.9–3.6)
LYMPHOCYTES NFR BLD: 27 % (ref 21–52)
MCH RBC QN AUTO: 31.1 PG (ref 24–34)
MCHC RBC AUTO-ENTMCNC: 34.4 G/DL (ref 31–37)
MCV RBC AUTO: 90.2 FL (ref 74–97)
MONOCYTES # BLD: 0.7 K/UL (ref 0.05–1.2)
MONOCYTES NFR BLD: 8 % (ref 3–10)
NEUTS SEG # BLD: 5.4 K/UL (ref 1.8–8)
NEUTS SEG NFR BLD: 64 % (ref 40–73)
PLATELET # BLD AUTO: 199 K/UL (ref 135–420)
PMV BLD AUTO: 9.4 FL (ref 9.2–11.8)
POTASSIUM SERPL-SCNC: 3.7 MMOL/L (ref 3.5–5.5)
PROT SERPL-MCNC: 7 G/DL (ref 6.4–8.2)
RBC # BLD AUTO: 4.09 M/UL (ref 4.2–5.3)
SODIUM SERPL-SCNC: 138 MMOL/L (ref 136–145)
TROPONIN I SERPL-MCNC: <0.02 NG/ML (ref 0–0.04)
WBC # BLD AUTO: 8.5 K/UL (ref 4.6–13.2)

## 2018-12-08 PROCEDURE — 74011000250 HC RX REV CODE- 250: Performed by: EMERGENCY MEDICINE

## 2018-12-08 PROCEDURE — 99285 EMERGENCY DEPT VISIT HI MDM: CPT

## 2018-12-08 PROCEDURE — S0028 INJECTION, FAMOTIDINE, 20 MG: HCPCS | Performed by: EMERGENCY MEDICINE

## 2018-12-08 PROCEDURE — 80053 COMPREHEN METABOLIC PANEL: CPT

## 2018-12-08 PROCEDURE — 83690 ASSAY OF LIPASE: CPT

## 2018-12-08 PROCEDURE — 74011250636 HC RX REV CODE- 250/636: Performed by: EMERGENCY MEDICINE

## 2018-12-08 PROCEDURE — 74011250637 HC RX REV CODE- 250/637: Performed by: EMERGENCY MEDICINE

## 2018-12-08 PROCEDURE — 85025 COMPLETE CBC W/AUTO DIFF WBC: CPT

## 2018-12-08 PROCEDURE — 96374 THER/PROPH/DIAG INJ IV PUSH: CPT

## 2018-12-08 PROCEDURE — 84484 ASSAY OF TROPONIN QUANT: CPT

## 2018-12-08 PROCEDURE — 93005 ELECTROCARDIOGRAM TRACING: CPT

## 2018-12-08 RX ORDER — FAMOTIDINE 10 MG/ML
20 INJECTION INTRAVENOUS
Status: COMPLETED | OUTPATIENT
Start: 2018-12-08 | End: 2018-12-08

## 2018-12-08 RX ADMIN — SODIUM CHLORIDE 1000 ML: 900 INJECTION, SOLUTION INTRAVENOUS at 22:07

## 2018-12-08 RX ADMIN — FAMOTIDINE 20 MG: 10 INJECTION, SOLUTION INTRAVENOUS at 22:07

## 2018-12-08 RX ADMIN — LIDOCAINE HYDROCHLORIDE 40 ML: 20 SOLUTION ORAL; TOPICAL at 21:02

## 2018-12-09 VITALS
TEMPERATURE: 98.1 F | OXYGEN SATURATION: 100 % | DIASTOLIC BLOOD PRESSURE: 70 MMHG | RESPIRATION RATE: 15 BRPM | SYSTOLIC BLOOD PRESSURE: 133 MMHG | HEART RATE: 71 BPM

## 2018-12-09 LAB
ATRIAL RATE: 79 BPM
CALCULATED P AXIS, ECG09: 53 DEGREES
CALCULATED R AXIS, ECG10: 73 DEGREES
CALCULATED T AXIS, ECG11: 61 DEGREES
DIAGNOSIS, 93000: NORMAL
P-R INTERVAL, ECG05: 134 MS
Q-T INTERVAL, ECG07: 380 MS
QRS DURATION, ECG06: 86 MS
QTC CALCULATION (BEZET), ECG08: 435 MS
TROPONIN I SERPL-MCNC: <0.02 NG/ML (ref 0–0.04)
VENTRICULAR RATE, ECG03: 79 BPM

## 2018-12-09 PROCEDURE — 74011250636 HC RX REV CODE- 250/636

## 2018-12-09 PROCEDURE — 74011250637 HC RX REV CODE- 250/637

## 2018-12-09 PROCEDURE — C9113 INJ PANTOPRAZOLE SODIUM, VIA: HCPCS

## 2018-12-09 PROCEDURE — 74011250637 HC RX REV CODE- 250/637: Performed by: EMERGENCY MEDICINE

## 2018-12-09 PROCEDURE — 84484 ASSAY OF TROPONIN QUANT: CPT

## 2018-12-09 PROCEDURE — 96375 TX/PRO/DX INJ NEW DRUG ADDON: CPT

## 2018-12-09 RX ORDER — SUCRALFATE 1 G/1
TABLET ORAL
Status: COMPLETED
Start: 2018-12-09 | End: 2018-12-09

## 2018-12-09 RX ORDER — PANTOPRAZOLE SODIUM 40 MG/10ML
INJECTION, POWDER, LYOPHILIZED, FOR SOLUTION INTRAVENOUS
Status: COMPLETED
Start: 2018-12-09 | End: 2018-12-09

## 2018-12-09 RX ORDER — SIMETHICONE 80 MG
80 TABLET,CHEWABLE ORAL
Status: COMPLETED | OUTPATIENT
Start: 2018-12-09 | End: 2018-12-09

## 2018-12-09 RX ORDER — SUCRALFATE 1 G/1
1 TABLET ORAL
Status: COMPLETED | OUTPATIENT
Start: 2018-12-09 | End: 2018-12-09

## 2018-12-09 RX ORDER — PANTOPRAZOLE SODIUM 40 MG/10ML
40 INJECTION, POWDER, LYOPHILIZED, FOR SOLUTION INTRAVENOUS
Status: COMPLETED | OUTPATIENT
Start: 2018-12-09 | End: 2018-12-09

## 2018-12-09 RX ORDER — FAMOTIDINE 20 MG/1
20 TABLET, FILM COATED ORAL 2 TIMES DAILY
Qty: 60 TAB | Refills: 0 | Status: SHIPPED | OUTPATIENT
Start: 2018-12-09

## 2018-12-09 RX ORDER — SIMETHICONE 80 MG
TABLET,CHEWABLE ORAL
Status: COMPLETED
Start: 2018-12-09 | End: 2018-12-09

## 2018-12-09 RX ADMIN — PANTOPRAZOLE SODIUM 40 MG: 40 INJECTION, POWDER, FOR SOLUTION INTRAVENOUS at 01:39

## 2018-12-09 RX ADMIN — SIMETHICONE CHEW TAB 80 MG 80 MG: 80 TABLET ORAL at 01:38

## 2018-12-09 RX ADMIN — SUCRALFATE 1 G: 1 TABLET ORAL at 01:39

## 2018-12-09 RX ADMIN — SUCRALFATE 1 G: 1 TABLET ORAL at 01:37

## 2018-12-09 RX ADMIN — SIMETHICONE CHEW TAB 80 MG 80 MG: 80 TABLET ORAL at 01:39

## 2018-12-09 RX ADMIN — PANTOPRAZOLE SODIUM 40 MG: 40 INJECTION, POWDER, LYOPHILIZED, FOR SOLUTION INTRAVENOUS at 01:39

## 2018-12-09 NOTE — ED TRIAGE NOTES
Comes in complaining of chest discomfort for several days, states that she has been belching a lot. Also complains of a headache that started today.

## 2018-12-09 NOTE — DISCHARGE INSTRUCTIONS
Gastroesophageal Reflux Disease (GERD): Care Instructions  Your Care Instructions    Gastroesophageal reflux disease (GERD) is the backward flow of stomach acid into the esophagus. The esophagus is the tube that leads from your throat to your stomach. A one-way valve prevents the stomach acid from moving up into this tube. When you have GERD, this valve does not close tightly enough. If you have mild GERD symptoms including heartburn, you may be able to control the problem with antacids or over-the-counter medicine. Changing your diet, losing weight, and making other lifestyle changes can also help reduce symptoms. Follow-up care is a key part of your treatment and safety. Be sure to make and go to all appointments, and call your doctor if you are having problems. It's also a good idea to know your test results and keep a list of the medicines you take. How can you care for yourself at home? · Take your medicines exactly as prescribed. Call your doctor if you think you are having a problem with your medicine. · Your doctor may recommend over-the-counter medicine. For mild or occasional indigestion, antacids, such as Tums, Gaviscon, Mylanta, or Maalox, may help. Your doctor also may recommend over-the-counter acid reducers, such as Pepcid AC, Tagamet HB, Zantac 75, or Prilosec. Read and follow all instructions on the label. If you use these medicines often, talk with your doctor. · Change your eating habits. ? It's best to eat several small meals instead of two or three large meals. ? After you eat, wait 2 to 3 hours before you lie down. ? Chocolate, mint, and alcohol can make GERD worse. ? Spicy foods, foods that have a lot of acid (like tomatoes and oranges), and coffee can make GERD symptoms worse in some people. If your symptoms are worse after you eat a certain food, you may want to stop eating that food to see if your symptoms get better.   · Do not smoke or chew tobacco. Smoking can make GERD worse. If you need help quitting, talk to your doctor about stop-smoking programs and medicines. These can increase your chances of quitting for good. · If you have GERD symptoms at night, raise the head of your bed 6 to 8 inches by putting the frame on blocks or placing a foam wedge under the head of your mattress. (Adding extra pillows does not work.)  · Do not wear tight clothing around your middle. · Lose weight if you need to. Losing just 5 to 10 pounds can help. When should you call for help? Call your doctor now or seek immediate medical care if:    · You have new or different belly pain.     · Your stools are black and tarlike or have streaks of blood.    Watch closely for changes in your health, and be sure to contact your doctor if:    · Your symptoms have not improved after 2 days.     · Food seems to catch in your throat or chest.   Where can you learn more? Go to http://maryjo-katalina.info/. Enter C099 in the search box to learn more about \"Gastroesophageal Reflux Disease (GERD): Care Instructions. \"  Current as of: March 28, 2018  Content Version: 11.8  © 3127-1517 [employer]. Care instructions adapted under license by [employer] (which disclaims liability or warranty for this information). If you have questions about a medical condition or this instruction, always ask your healthcare professional. [address] any warranty or liability for your use of this information. Upper GI Endoscopy: Before Your Procedure  What is an upper GI endoscopy? An upper gastrointestinal (or GI) endoscopy is a test that allows your doctor to look at the inside of your esophagus, stomach, and the first part of your small intestine, called the duodenum. The esophagus is the tube that carries food to your stomach. The doctor uses a thin, lighted tube that bends. It is called an endoscope, or scope.   The doctor puts the tip of the scope in your mouth and gently moves it down your throat. The scope is a flexible video camera. The doctor looks at a monitor (like a TV set or a computer screen) as he or she moves the scope. A doctor may do this test, which is also called a procedure, to look for ulcers, tumors, infection, or bleeding. It also can be used to look for signs of acid backing up into your esophagus. This is called gastroesophageal reflux disease, or GERD. The doctor can use the scope to take a sample of tissue for study (a biopsy). The doctor also can use the scope to take out growths or stop bleeding. Follow-up care is a key part of your treatment and safety. Be sure to make and go to all appointments, and call your doctor if you are having problems. It's also a good idea to know your test results and keep a list of the medicines you take. What happens before the procedure?   Preparing for the procedure    · Understand exactly what procedure is planned, along with the risks, benefits, and other options. · Tell your doctors ALL the medicines, vitamins, supplements, and herbal remedies you take. Some of these can increase the risk of bleeding or interact with anesthesia.     · If you take blood thinners, such as warfarin (Coumadin), clopidogrel (Plavix), or aspirin, be sure to talk to your doctor. He or she will tell you if you should stop taking these medicines before your procedure. Make sure that you understand exactly what your doctor wants you to do.     · Your doctor will tell you which medicines to take or stop before your procedure. You may need to stop taking certain medicines a week or more before the procedure. So talk to your doctor as soon as you can.     · If you have an advance directive, let your doctor know. It may include a living will and a durable power of  for health care. Bring a copy to the hospital. If you don't have one, you may want to prepare one.  It lets your doctor and loved ones know your health care wishes. Doctors advise that everyone prepare these papers before any type of surgery or procedure. Procedures can be stressful. This information will help you understand what you can expect. And it will help you safely prepare for your procedure. What happens on the day of the procedure? · Follow the instructions exactly about when to stop eating and drinking. If you don't, your procedure may be canceled. If your doctor told you to take your medicines on the day of the procedure, take them with only a sip of water.     · Take a bath or shower before you come in for your procedure. Do not apply lotions, perfumes, deodorants, or nail polish.     · Take off all jewelry and piercings. And take out contact lenses, if you wear them.    At the hospital or surgery center   · Bring a picture ID.     · The test may take 15 to 30 minutes.     · The doctor may spray medicine on the back of your throat to numb it. You also will get medicine to prevent pain and to relax you.     · You will lie on your left side. The doctor will put the scope in your mouth and toward the back of your throat. The doctor will tell you when to swallow. This helps the scope move down your throat. You will be able to breathe normally. The doctor will move the scope down your esophagus into your stomach. The doctor also may look at the duodenum.     · If your doctor wants to take a sample of tissue for a biopsy, he or she may use small surgical tools, which are put into the scope, to cut off some tissue. You will not feel a biopsy, if one is taken. The doctor also can use the tools to stop bleeding or to do other treatments, if needed.     · You will stay at the hospital or surgery center for 1 to 2 hours until the medicine you were given wears off. Going home   · Be sure you have someone to drive you home.  Anesthesia and pain medicine make it unsafe for you to drive.     · You will be given more specific instructions about recovering from your procedure. They will cover things like diet, wound care, follow-up care, driving, and getting back to your normal routine. When should you call your doctor? · You have questions or concerns.     · You don't understand how to prepare for your procedure.     · You become ill before the procedure (such as fever, flu, or a cold).     · You need to reschedule or have changed your mind about having the procedure. Where can you learn more? Go to http://maryjo-katalina.info/. Enter P790 in the search box to learn more about \"Upper GI Endoscopy: Before Your Procedure. \"  Current as of: March 28, 2018  Content Version: 11.8  © 3654-3558 [employer], [employer]. Care instructions adapted under license by [employer] (which disclaims liability or warranty for this information). If you have questions about a medical condition or this instruction, always ask your healthcare professional. [address] any warranty or liability for your use of this information.

## 2018-12-09 NOTE — ED PROVIDER NOTES
EMERGENCY DEPARTMENT HISTORY AND PHYSICAL EXAM 
 
8:49 PM 
 
 
Date: 12/8/2018 Patient Name: Emil Adams History of Presenting Illness Chief Complaint Patient presents with  Epigastric Pain  Chest Pain History Provided By: Patient Additional History (Context): Emil Adams is a 46 y.o. female with SVT who presents with worsening indigestion that started a few days ago. She is also complaining of nausea and trouble swallowing. She has not taken any pain meds or steroids recently. Denies vomiting, dysuria, fever, and diaphoresis. No recent travel, no hx of DVT, or recent surgeries. She does not smoke or drink. Her mom did have a DVT. PCP: Gay Isaac MD 
 
Current Facility-Administered Medications Medication Dose Route Frequency Provider Last Rate Last Dose  sucralfate (CARAFATE) tablet 1 g  1 g Oral NOW Charles Dash MD      
 simethicone (MYLICON) tablet 80 mg  80 mg Oral Charles Leone MD      
 pantoprazole (PROTONIX) injection 40 mg  40 mg IntraVENous Charles Leone MD      
 
Current Outpatient Medications Medication Sig Dispense Refill  famotidine (PEPCID) 20 mg tablet Take 1 Tab by mouth two (2) times a day. 60 Tab 0  
 HYDROmorphone (DILAUDID) 2 mg tablet Take 1 Tab by mouth every four (4) hours as needed for Pain. Max Daily Amount: 12 mg. 20 Tab 0  
 escitalopram oxalate (LEXAPRO) 10 mg tablet Take 10 mg by mouth daily.  estrogen, conjugated,-medroxyPROGESTERone (PREMPRO) 0.625-2.5 mg per tablet Take 1 Tab by mouth daily.  gabapentin (NEURONTIN) 300 mg capsule Take 300 mg by mouth nightly. Past History Past Medical History: 
Past Medical History:  
Diagnosis Date  Anxiety  SVT (supraventricular tachycardia) (HCC) Past Surgical History: 
Past Surgical History:  
Procedure Laterality Date  HX APPENDECTOMY  11/14/2017  HX SVT ABLATION  2014 Family History: 
Family History Problem Relation Age of Onset  Breast Cancer Maternal Grandmother  Breast Cancer Other Maternal cousin Social History: 
Social History Tobacco Use  Smoking status: Never Smoker  Smokeless tobacco: Never Used Substance Use Topics  Alcohol use: No  
  Frequency: Never  Drug use: No  
 
 
Allergies: Allergies Allergen Reactions  Clindamycin Swelling  Iodinated Contrast- Oral And Iv Dye Hives Review of Systems Review of Systems Constitutional: Negative for diaphoresis and fever. HENT: Positive for trouble swallowing. Cardiovascular: Positive for chest pain (indigestion). Gastrointestinal: Positive for nausea. Negative for vomiting. Genitourinary: Negative for dysuria. All other systems reviewed and are negative. Physical Exam  
 
Visit Vitals /70 Pulse 72 Temp 98.1 °F (36.7 °C) Resp 16 SpO2 100% Physical Exam  
Constitutional: She is oriented to person, place, and time. She appears well-developed and well-nourished. HENT:  
Head: Normocephalic and atraumatic. Eyes: EOM are normal. Pupils are equal, round, and reactive to light. Right eye exhibits no discharge. Left eye exhibits no discharge. Neck: Normal range of motion. Neck supple. No JVD present. No tracheal deviation present. Cardiovascular: Normal rate, regular rhythm and normal heart sounds. No murmur heard. No calf tenderness Pulmonary/Chest: Effort normal and breath sounds normal. No respiratory distress. She has no wheezes. She has no rales. Lungs clear bilaterally Abdominal: Soft. She exhibits no distension. There is no tenderness. There is no rebound. Hyperactive bowel sounds Musculoskeletal: Normal range of motion. She exhibits no edema, tenderness or deformity. Neurological: She is alert and oriented to person, place, and time. No cranial nerve deficit.   
5/5 strength UE/LE, 5/5 sensation UE/LE 
  
 Skin: Skin is warm and dry. No rash noted. No erythema. Psychiatric: She has a normal mood and affect. Her behavior is normal.  
 
 
 
Diagnostic Study Results Labs - Recent Results (from the past 12 hour(s)) EKG, 12 LEAD, INITIAL Collection Time: 12/08/18  8:30 PM  
Result Value Ref Range Ventricular Rate 79 BPM  
 Atrial Rate 79 BPM  
 P-R Interval 134 ms QRS Duration 86 ms  
 Q-T Interval 380 ms QTC Calculation (Bezet) 435 ms Calculated P Axis 53 degrees Calculated R Axis 73 degrees Calculated T Axis 61 degrees Diagnosis Normal sinus rhythm Normal ECG When compared with ECG of 14-NOV-2017 10:09, No significant change was found CBC WITH AUTOMATED DIFF Collection Time: 12/08/18  8:45 PM  
Result Value Ref Range WBC 8.5 4.6 - 13.2 K/uL  
 RBC 4.09 (L) 4.20 - 5.30 M/uL  
 HGB 12.7 12.0 - 16.0 g/dL HCT 36.9 35.0 - 45.0 % MCV 90.2 74.0 - 97.0 FL  
 MCH 31.1 24.0 - 34.0 PG  
 MCHC 34.4 31.0 - 37.0 g/dL  
 RDW 12.7 11.6 - 14.5 % PLATELET 381 299 - 728 K/uL MPV 9.4 9.2 - 11.8 FL  
 NEUTROPHILS 64 40 - 73 % LYMPHOCYTES 27 21 - 52 % MONOCYTES 8 3 - 10 % EOSINOPHILS 1 0 - 5 % BASOPHILS 0 0 - 2 %  
 ABS. NEUTROPHILS 5.4 1.8 - 8.0 K/UL  
 ABS. LYMPHOCYTES 2.3 0.9 - 3.6 K/UL  
 ABS. MONOCYTES 0.7 0.05 - 1.2 K/UL  
 ABS. EOSINOPHILS 0.1 0.0 - 0.4 K/UL  
 ABS. BASOPHILS 0.0 0.0 - 0.1 K/UL  
 DF AUTOMATED    
LIPASE Collection Time: 12/08/18  8:45 PM  
Result Value Ref Range Lipase 108 73 - 221 U/L  
METABOLIC PANEL, COMPREHENSIVE Collection Time: 12/08/18  8:45 PM  
Result Value Ref Range Sodium 138 136 - 145 mmol/L Potassium 3.7 3.5 - 5.5 mmol/L Chloride 105 100 - 108 mmol/L  
 CO2 28 21 - 32 mmol/L Anion gap 5 3.0 - 18 mmol/L Glucose 89 74 - 99 mg/dL BUN 8 7.0 - 18 MG/DL Creatinine 0.78 0.6 - 1.3 MG/DL  
 BUN/Creatinine ratio 10 (L) 12 - 20 GFR est AA >60 >60 ml/min/1.73m2 GFR est non-AA >60 >60 ml/min/1.73m2 Calcium 9.0 8.5 - 10.1 MG/DL Bilirubin, total 0.4 0.2 - 1.0 MG/DL  
 ALT (SGPT) 19 13 - 56 U/L  
 AST (SGOT) 12 (L) 15 - 37 U/L Alk. phosphatase 76 45 - 117 U/L Protein, total 7.0 6.4 - 8.2 g/dL Albumin 3.5 3.4 - 5.0 g/dL Globulin 3.5 2.0 - 4.0 g/dL A-G Ratio 1.0 0.8 - 1.7    
TROPONIN I Collection Time: 12/08/18  8:45 PM  
Result Value Ref Range Troponin-I, Qt. <0.02 0.0 - 0.045 NG/ML  
TROPONIN I Collection Time: 12/09/18 12:33 AM  
Result Value Ref Range Troponin-I, Qt. <0.02 0.0 - 0.045 NG/ML Radiologic Studies - No orders to display Medical Decision Making I am the first provider for this patient. I reviewed the vital signs, available nursing notes, past medical history, past surgical history, family history and social history. Vital Signs-Reviewed the patient's vital signs. Records Reviewed: Nursing Notes Provider Notes (Medical Decision Making): MDM Number of Diagnoses or Management Options Gastroesophageal reflux disease with esophagitis:  
Diagnosis management comments: Diff dx: gerd, gastritis, acs Pt has hx gerd, has had similar presentations with gerd in past, reports belching, voice change, EKG benign, serial trop neg, cxr neg. Some relief with H2 blocker, will rx this. Have her f/u with GI doc, heart score is 1 for age, no need for stress at this time. Pt comfortable with d/c, will return if worsening. No concern for PE at this time, no hypoxia, tachypnea, tachycardia. Safe for d/c, careful return precautions given. Pt/family comfortable with plan Beulah Mccormick MD 
 
 
 
 
 
Diagnosis Clinical Impression:  
1. Gastroesophageal reflux disease with esophagitis Disposition: HOME Follow-up Information Follow up With Specialties Details Why Contact Info Brooklynn Gage MD Family Practice Go in 2 days For Follow up [id number] [facility] [address] [facility] [id number] [phone] [facility] EMERGENCY DEPT Emergency Medicine Go to As needed, If symptoms worsen [address] 
[phone] Medication List  
  
START taking these medications   
famotidine 20 mg tablet Commonly known as:  PEPCID Take 1 Tab by mouth two (2) times a day. CONTINUE taking these medications   
escitalopram oxalate 10 mg tablet Commonly known as:  Cari Ladamrita HYDROmorphone 2 mg tablet Commonly known as:  DILAUDID Take 1 Tab by mouth every four (4) hours as needed for Pain. Max Daily Amount: 12 mg. NEURONTIN 300 mg capsule Generic drug:  gabapentin PREMPRO 0.625-2.5 mg per tablet Generic drug:  estrogen (conjugated)-medroxyPROGESTERone Where to Get Your Medications Information about where to get these medications is not yet available Ask your nurse or doctor about these medications · famotidine 20 mg tablet 
  
 
_______________________________ Attestations: 
Scribe Attestation Karyn Cordova acting as a scribe for and in the presence of Mejia Moore MD     
December 08, 2018 at 8:58 PM 
    
Provider Attestation:     
I personally performed the services described in the documentation, reviewed the documentation, as recorded by the scribe in my presence, and it accurately and completely records my words and actions. December 08, 2018 at 8:58 PM - Mejia Moore MD   
_______________________________

## 2019-03-15 RX ORDER — FLUTICASONE PROPIONATE 50 MCG
SPRAY, SUSPENSION (ML) NASAL
Qty: 16 ML | Refills: 0 | Status: SHIPPED | OUTPATIENT
Start: 2019-03-15

## 2019-04-15 RX ORDER — FLUTICASONE PROPIONATE 50 MCG
SPRAY, SUSPENSION (ML) NASAL
Qty: 16 ML | Refills: 0 | OUTPATIENT
Start: 2019-04-15

## 2019-04-15 NOTE — TELEPHONE ENCOUNTER
Please tell the patient that flonase is OTC.  She needs a PCP. She has not seen Dr Nettles since 2016.

## 2019-12-16 ENCOUNTER — HOSPITAL ENCOUNTER (OUTPATIENT)
Dept: MAMMOGRAPHY | Age: 54
Discharge: HOME | End: 2019-12-16
Payer: COMMERCIAL

## 2019-12-16 DIAGNOSIS — Z12.31 VISIT FOR SCREENING MAMMOGRAM: ICD-10-CM

## 2019-12-16 PROCEDURE — 77063 BREAST TOMOSYNTHESIS BI: CPT

## 2022-02-22 ENCOUNTER — HOSPITAL ENCOUNTER (OUTPATIENT)
Dept: LAB | Age: 57
Discharge: HOME | End: 2022-02-22
Payer: COMMERCIAL

## 2022-02-22 PROCEDURE — 88175 CYTOPATH C/V AUTO FLUID REDO: CPT

## 2022-02-22 PROCEDURE — 87624 HPV HI-RISK TYP POOLED RSLT: CPT

## 2022-04-21 ENCOUNTER — HOSPITAL ENCOUNTER (OUTPATIENT)
Dept: PHYSICAL THERAPY | Age: 57
Discharge: HOME | End: 2022-04-21
Payer: COMMERCIAL

## 2022-04-21 PROCEDURE — 97162 PT EVAL MOD COMPLEX 30 MIN: CPT

## 2022-04-21 PROCEDURE — 97535 SELF CARE MNGMENT TRAINING: CPT

## 2022-04-21 NOTE — PROGRESS NOTES
PT SHOULDER EVAL AND TREATMENT      Patient Name: Xiao Crawford  Date:2022  : 1965  [x]  Patient  Verified  Payor: BLUE CROSS / Plan: [employer] [facility] [place] / Product Type: PPO /    In time:1240  Out time:113  Total Treatment Time (min): 33  Total Timed Codes (min): 15  1:1 Treatment Time (MC/BC only): 33   Visit #: 1 of 8-12    Treatment Area: Right shoulder pain [M25.511]  Acute post-operative pain [G89.18]    SUBJECTIVE  Pain Level (0-10 scale):  (C):9  (B):0  (W):10    Any medication changes, allergies to medications, diagnosis change, or new procedure performed: see summary sheet for update  Subjective functional status/changes:  CHIEF COMPLAINT:  Patient reports sp R shoulder large RTC repair DOS 2022. Patient has been having issue with reaction to pain medications after being on multiple medications therefore pain levels are elevated. Patient is in contact with surgeons office. THEREFORE PATIENT SEVERELY GUARDED DURING INITIAL EVAL. Patient reports long hx of pain for 1.5 - 2 years insidious onset with progressive worsening. Attempted conservative management with PT and steroids with short term pain relief only resulting in surgical repair. Patient is R hand dominant. Patient has support system of mother and 15 yo son.    DEFICITS: deficits associated with PROM protocol, sleep, PLOF as , rec activities: walking and yard work, keeping up with 15 yo child   OBJECTIVE:   Posture: in sling  ROM:  L WFL                                       PROM   Right   Flexion 13   Extension  0   Scaption/ABD 16   ER @ 0 Deg ABD Lacking 15 deg   IR/ADD (FIR) IR to stomach   CS - general stiffness ; decreased 25% throughout   Strength:     NT sec to post op protocol                                                              strength :  NT     Palpation  [] Min  [] Mod  [] Severe    Location:      Patient Education/ Therapeutic Exercise : [x] Discussed POT including PT expectation, established HEP with pictures and instruction, signs of infection   (minutes) : 15    Manual: not tolerated today     Modality (rationale): ICE 10 MIN WHILE INSTRUCTING ON HEP     Pain Level (0-10 scale) post treatment: 9    ASSESSMENT  [x]  See Plan of Care    PLAN  [x]  Upgrade activities as tolerated  [x] Other:_POC   1-3 x 6 weeks     Alfonzo Pedersen, PT 4/21/2022    Justification for Eval Code Complexity:  Patient History : long hx of shoulder pain   Examination see exam   Clinical Presentation: evolving with post op protocol   Clinical Decision Making : FOTO : 38 /100

## 2022-04-21 NOTE — PROGRESS NOTES
[address] [facility], [place] [facility] AT [address]  Phone: [phone] Fax: [phone] / STATEMENT OF MEDICAL NECESSITY FOR PHYSICAL THERAPY SERVICES  Patient Name: Roberto Ellis : 1965   Medical   Diagnosis: Right shoulder pain [M25.511]  Acute post-operative pain [G89.18] Treatment Diagnosis: R shoulder RCR    Onset Date: DOS 2022     Referral Source: [facility]): 2022   Prior Hospitalization: See medical history Provider #: [id number]   Prior Level of Function: Functional I    Comorbidities: None noted   Medications: Verified on Patient Summary List   The Plan of Care and following information is based on the information from the initial evaluation.   ========================================================================  Assessment / key information:  Pt is a 61y.o. year old female who presents with  sp R shoulder large RTC repair DOS 2022. Patient has been having issue with reaction to pain medications after being on multiple medications therefore pain levels are elevated. Patient is in contact with surgeons office. THEREFORE PATIENT SEVERELY GUARDED DURING INITIAL EVAL. Patient reports long hx of pain for 1.5 - 2 years insidious onset with progressive worsening. Attempted conservative management with PT and steroids with short term pain relief only resulting in surgical repair. Patient is R hand dominant. Patient has support system of mother and 15 yo son.  DEFICITS: deficits associated with PROM protocol, sleep, PLOF as , rec activities: walking and yard work, keeping up with 15 yo child   Posture: in sling  ROM:  L WFL                                       PROM   Right   Flexion 13   Extension  0   [place]/ABD 16   ER @ 0 Deg ABD Lacking 15 deg   IR/ADD (FIR) IR to stomach   CS - general stiffness ; decreased 25% throughout   Elbow PROM 5 - 114   Strength:     NT sec to post op protocol                                                              strength :  NT .    Home exercise program initiated on initial evaluation to address these deficits. Pt would benefit from PT to address these deficits for increased functional mobility and QOL.  ========================================================================  Eval Complexity: History: MEDIUM  Complexity : 1-2 comorbidities / personal factors will impact the outcome/ POC Exam:HIGH Complexity : 4+ Standardized tests and measures addressing body structure, function, activity limitation and / or participation in recreation  Presentation: MEDIUM Complexity : Evolving with changing characteristics  Clinical Decision Making:MEDIUM Complexity : FOTO score of 26-74Overall Complexity:MEDIUM  Problem List: pain affecting function, decrease ROM, decrease strength, edema affecting function, impaired gait/ balance, decrease ADL/ functional abilitiies, decrease activity tolerance, decrease flexibility/ joint mobility, decrease transfer abilities and other FOTO 38/100   Treatment Plan may include any combination of the following: Therapeutic exercise, Therapeutic activities, Neuromuscular re-education, Physical agent/modality, Gait/balance training, Manual therapy, Aquatic therapy, Patient education, Self Care training, Functional mobility training, Home safety training and Stair training  Patient / Family readiness to learn indicated by: asking questions, trying to perform skills and interest  Persons(s) to be included in education: patient (P)  Barriers to Learning/Limitations: None  Measures taken:    Patient Goal (s): \"relief \"   Patient self reported health status: good  Rehabilitation Potential: good   Short Term Goals: To be accomplished in  1  weeks:  1. Pt will be independent and compliant with HEP  Status at last assessment :HEP est at initial eval and will be progress as needed.  Long Term Goals:  To be accomplished in 8-12  treatments:  1. Patient will increase FOTO score to 64/100 for indications of increased functional mobility. Status at last assessment : 38/100   2. Patient will demo PROM shoulder flexion to 90 deg per protocol for progression of joint mobility   Status at last assessment : 13 deg   3. Patient will demo PROM ER to 40 deg per protocol for progression to decrease impingement risk   Status at last assessment :lacking 15   4. Patient will demo PROM elbow 0-130 for ease with progression to feeding   Status at last assessment :5-114   Frequency / Duration:   Patient to be seen  2-3  times per week for 8-16  treatments:   Patient / Caregiver education and instruction: self care, activity modification and exercises  Therapist Signature: Oskar Champagne, CHARANJIT Date: 1/53/5621   Certification Period: NA Time: 133p   ========================================================================  I certify that the above Physical Therapy Services are being furnished while the patient is under my care. I agree with the treatment plan and certify that this therapy is necessary. Physician Signature:        Date:       Time:                                         Dian Mirza*  Please sign and return to [facility] or you may fax the signed copy to [phone]. Thank you.

## 2022-04-25 ENCOUNTER — HOSPITAL ENCOUNTER (OUTPATIENT)
Dept: PHYSICAL THERAPY | Age: 57
Discharge: HOME | End: 2022-04-25
Payer: COMMERCIAL

## 2022-04-25 PROCEDURE — 97110 THERAPEUTIC EXERCISES: CPT

## 2022-04-25 PROCEDURE — 97016 VASOPNEUMATIC DEVICE THERAPY: CPT

## 2022-04-25 NOTE — PROGRESS NOTES
PHYSICAL THERAPY - DAILY TREATMENT NOTE    Patient Name: Cristian Saul        Date: 2022  : 1965   YES Patient  Verified  Visit #:    2  of   8-10  Insurance: Payor: BLUE CROSS / Plan: [employer] [facility] / Product Type: PPO /      In time: 4:25 Out time: 0520   Total Treatment Time: 55     Medicare/BCBS Time Tracking (below)   Total Timed Codes (min):  40 1:1 Treatment Time:  40     TREATMENT AREA = Right shoulder pain [M25.511]  Acute post-operative pain [G89.18]    SUBJECTIVE    Pain Level (on 0 to 10 scale):  4 / 10   Medication Changes/New allergies or changes in medical history, any new surgeries or procedures?     NO    If yes, update Summary List   Subjective Functional Status/Changes:  []  No changes reported     \"My pain is a 4/10 now but an 8/10 when I got dressed today\"          OBJECTIVE    Therapeutic Procedures:  Min Procedure Specifics + Rationale   n/a [x]  Patient Education (performed throughout session) [x] Review HEP    [] Progressed/Changed HEP based on:   [] proper performance and advancement of Therex/TA   [] reduction in pain level    [] increased functional capacity       [] change in directional preference   40 [x] Therapeutic Exercise   [x]  See Flowsheet   Rationale: increase ROM and increase strength to improve the patients ability to participate in ADL's      Modality rationale: decrease inflammation, decrease pain, increase tissue extensibility and increase muscle contraction/control to improve the patients ability to perform ADL's with greater ease   Min Type Additional Details   15 [x]  Cold Pack/Game ready  [] pre-SARAH          [x] post-SARAH  []  Ice massage Location: Right shoulder  [x] Semi-Fowlers position              [] prone  [] legs elevated    [x] legs flat   [x] Skin assessment post-treatment:  [x]intact [x]redness- no adverse reaction       []redness - adverse reaction:     Other Objective/Functional Measures:    PT initiated program based on deficits noted on day of initial evaluation. POCX established per protocol provided by Dr. Suzy Conteh. PT and pt had lengthy discussion on post op restrictions and PROM only per protocol. Pt verbalized her understnading. See flow sheet for more details. Progressed therex per flow sheet. Post Treatment Pain Level (on 0 to 10) scale:   4  / 10     ASSESSMENT    Assessment/Changes in Function:     Pt tolerated first post initial evaluation well with mild pain limiting PROM. PT and pt had length discussion on PROM only and post-op precautions. Pt verbalized her understanding. Significant muscle guarding limited PROM. []  See Plan of Care  []  See Progress Note/ Recertification  []  See Discharge Summary        Patient will continue to benefit from skilled PT services to modify and progress therapeutic interventions, address functional mobility deficits, address ROM deficits, address strength deficits, analyze and address soft tissue restrictions, analyze and cue movement patterns, analyze and modify body mechanics/ergonomics, assess and modify postural abnormalities, address imbalance/dizziness and instruct in home and community integration  to attain remaining goals   Progress toward goals / Updated goals:    No progress to report as this was first visit post initial evaluation.       PLAN    [x]  Upgrade activities as tolerated  [x]  Update interventions per flow sheet YES Continue plan of care   []  Discharge due to :    []  Other:      Therapist: Tabitha Alvarado DPT    Date: 4/25/2022 Time: 4:35 PM     Future Appointments   Date Time Provider Jmai Parrish   4/26/2022  3:30 PM [address] [place] [address] [facility]   5/3/2022  1:15 PM [address] [place] [address] [facility]   5/5/2022  3:30 PM Lakisha Walker, PT [facility]   5/9/2022 12:15 PM Emerald Beckham, PT [facility]   5/12/2022 12:15 PM Emerald Beckham, PT [facility]   5/16/2022 10:45 AM Emerald Beckham, PT [facility]   5/19/2022 11:30 AM Emerald Beckham, PT [facility]   5/23/2022 10:45 DEE Santana, PT [facility]   5/26/2022  2:00 PM Shellie Smith., PT [facility]   5/31/2022  2:00 PM Shellie Smith., PT [facility]   6/2/2022  1:15 PM Shellie Smith., PT [facility]   6/7/2022  2:00 PM Shellie Smith., PT [facility]   6/9/2022  1:15 PM Shellie Smith., PT [facility]   6/14/2022  2:00 PM Shellie Smith., PT [facility]   6/16/2022  1:15 PM Shellie Smith., PT [facility]   6/21/2022  2:00 PM Shellie Smith., PT [facility]   6/23/2022  1:15 PM Shellie Smith., PT [facility]   6/28/2022  2:00 PM Shellie Smith., PT [facility]   6/30/2022  1:15 PM Shellie Smith., PT [facility]

## 2022-04-26 ENCOUNTER — HOSPITAL ENCOUNTER (OUTPATIENT)
Dept: PHYSICAL THERAPY | Age: 57
Discharge: HOME | End: 2022-04-26
Payer: COMMERCIAL

## 2022-04-26 PROCEDURE — 97016 VASOPNEUMATIC DEVICE THERAPY: CPT

## 2022-04-26 PROCEDURE — 97140 MANUAL THERAPY 1/> REGIONS: CPT

## 2022-04-26 PROCEDURE — 97110 THERAPEUTIC EXERCISES: CPT

## 2022-04-26 NOTE — PROGRESS NOTES
PHYSICAL THERAPY - DAILY TREATMENT NOTE    Patient Name: Shahzad Patricia        Date: 2022  : 1965   YES Patient  Verified  Visit #:   3   of   8-10  Insurance: Payor: BLUE CROSS / Plan: [employer] [facility] / Product Type: PPO /      In time: 330 Out time: 425   Total Treatment Time: 55     Medicare/BCBS Time Tracking (below)   Total Timed Codes (min):  40 1:1 Treatment Time:  40     TREATMENT AREA = Right shoulder pain [M25.511]  Acute post-operative pain [G89.18]    SUBJECTIVE    Pain Level (on 0 to 10 scale):  2  / 10   Medication Changes/New allergies or changes in medical history, any new surgeries or procedures? NO    If yes, update Summary List   Subjective Functional Status/Changes:  []  No changes reported     \"I am not as sore as I thought I was going to be after yesterdays session\"          OBJECTIVE    Therapeutic Procedures:  Min Procedure Specifics + Rationale   n/a [x]  Patient Education (performed throughout session) [x] Review HEP    [] Progressed/Changed HEP based on:   [] proper performance and advancement of Therex/TA   [] reduction in pain level    [] increased functional capacity       [] change in directional preference   25 [x] Therapeutic Exercise   [x]  See Flowsheet   Rationale: increase ROM and increase strength to improve the patients ability to participate in ADL's    15 [x]  Manual Therapy         Right shoulder PROM in all planes.  (constant vcs to relax shoulder d/t muscle guarding.)  Rationale: decrease pain, increase ROM, increase tissue extensibility, decrease trigger points and increase postural awareness to attain functional use and participation with ADL's     Modality rationale: decrease inflammation, decrease pain, increase tissue extensibility and increase muscle contraction/control to improve the patients ability to perform ADL's with greater ease   Min Type Additional Details   15 [x]  Cold Pack  [] pre-SARAH          [x] post-SARAH  []  Ice massage Location: (Vaso/Game ready)Right shoulder  [] supine              [] prone  [x] Semi-Fowlers    [x] legs flat   [x] Skin assessment post-treatment:  [x]intact [x]redness- no adverse reaction       []redness - adverse reaction:     Other Objective/Functional Measures:  PT added scap squeezes and neck stretches to address deficits. Min vcs for proper exercise form. PT continued to emphasize PROM only. Encouraged pt to continue HEP. See flow sheet for more details. Progressed therex per flow sheet. Post Treatment Pain Level (on 0 to 10) scale:   2 / 10     ASSESSMENT    Assessment/Changes in Function:     Muscle guarding continues to limit PROM however significantly improved from prior sessions. PT will continue to progress pt per protocol. []  See Plan of Care  []  See Progress Note/ Recertification  []  See Discharge Summary        Patient will continue to benefit from skilled PT services to modify and progress therapeutic interventions, address functional mobility deficits, address ROM deficits, address strength deficits, analyze and address soft tissue restrictions, analyze and cue movement patterns, analyze and modify body mechanics/ergonomics, assess and modify postural abnormalities, address imbalance/dizziness and instruct in home and community integration  to attain remaining goals   Progress toward goals / Updated goals: · Short Term Goals: To be accomplished in  1  weeks:  1. Pt will be independent and compliant with HEP  Status at last assessment :Pt repots compliance. · Long Term Goals: To be accomplished in  8-12  treatments:  1. Patient will increase FOTO score to 64/100 for indications of increased functional mobility. Status at last assessment : 38/100   2. Patient will demo PROM shoulder flexion to 90 deg per protocol for progression of joint mobility   Status at last assessment : 13 deg   3.  Patient will demo PROM ER to 40 deg per protocol for progression to decrease impingement risk Status at last assessment :lacking 15   4.  Patient will demo PROM elbow 0-130 for ease with progression to feeding   Status at last assessment :5-114      PLAN    [x]  Upgrade activities as tolerated  [x]  Update interventions per flow sheet YES Continue plan of care   []  Discharge due to :    []  Other:      Therapist: Hanane Cohen DPT    Date: 4/26/2022 Time: 3:25 PM     Future Appointments   Date Time Provider Jami Parrish   4/26/2022  3:30 PM [address] [id number] [facility]   5/3/2022  1:15 PM [address] [address] [facility]   5/5/2022  3:30 PM [place] Oh[place]., PT [facility]   5/9/2022 12:15 PM Orlando Langston, PT [facility]   5/12/2022 12:15 PM Orlando Langston, PT [facility]   5/16/2022 10:45 AM Orlando Langston, PT [facility]   5/19/2022 11:30 AM Orlando Langston, PT [facility]   5/23/2022 10:45 AM Orlando Langston, PT [facility]   5/26/2022  2:00 PM [place] Oh[place]., PT [facility]   5/31/2022  2:00 PM [place]an Oh[place]., PT [facility]   6/2/2022  1:15 PM [place]an Oh[place]., PT [facility]   6/7/2022  2:00 PM [place]an Oh[place]., PT [facility]   6/9/2022  1:15 PM [place]an Oh[place]., PT [facility]   6/14/2022  2:00 PM [place]an Oh[place]., PT [facility]   6/16/2022  1:15 PM [place]an Oh[place]., PT [facility]   6/21/2022  2:00 PM [place]an Oh[place]., PT [facility]   6/23/2022  1:15 PM [place]an Ohog[place]., PT [facility]   6/28/2022  2:00 PM [place] Oh[place]., PT [facility]   6/30/2022  1:15 PM Kayla Melo., PT [facility]

## 2022-05-03 ENCOUNTER — HOSPITAL ENCOUNTER (OUTPATIENT)
Dept: PHYSICAL THERAPY | Age: 57
Discharge: HOME | End: 2022-05-03
Payer: COMMERCIAL

## 2022-05-03 PROCEDURE — 97140 MANUAL THERAPY 1/> REGIONS: CPT

## 2022-05-03 PROCEDURE — 97110 THERAPEUTIC EXERCISES: CPT

## 2022-05-03 NOTE — PROGRESS NOTES
PHYSICAL THERAPY - DAILY TREATMENT NOTE    Patient Name: Tanvir Jesus        Date: 5/3/2022  : 1965   yes Patient  Verified  Visit #:   4   of   8-10  Insurance: Payor: BLUE CROSS / Plan: [facility] / Product Type: PPO /      In time: 1:19 Out time: 2:30   Total Treatment Time: 71     Medicare/BCBS Time Tracking (below)   Total Timed Codes (min):  61 1:1 Treatment Time:  61     TREATMENT AREA =  Right shoulder pain [M25.511]  Acute post-operative pain [G89.18]    SUBJECTIVE  Pain Level (on 0 to 10 scale):  3  / 10   Medication Changes/New allergies or changes in medical history, any new surgeries or procedures?    no  If yes, update Summary List   Subjective Functional Status/Changes:  []  No changes reported     I am still having a really hard time sleeping at night and the muscles in the right side of my neck going down to my shoulder feels really tight and sore.           OBJECTIVE  Modalities Rationale:     decrease inflammation and decrease pain to improve patient's ability to improve functional abilities    min [] Estim, type/location:                                      []  att     []  unatt     []  w/US     []  w/ice    []  w/heat    min []  Mechanical Traction: type/lbs                   []  pro   []  sup   []  int   []  cont    []  before manual    []  after manual    min []  Ultrasound, settings/location:      min []  Iontophoresis w/ dexamethasone, location:                                               []  take home patch       []  in clinic    min []  Ice     []  Heat    location/position:     min []  Vasopneumatic Device, press/temp:    If using vaso (only need to measure limb vaso being performed on)      pre-treatment girth :       post-treatment girth :       measured at (landmark location) :    Vasopnuematic compression justification:  Per bilateral girth measures taken and listed above the edema is considered significant and having an impact on the patient's self care and ADLs min []  Other:    [x] Skin assessment post-treatment (if applicable):    [x]  intact    [x]  redness- no adverse reaction                  []redness - adverse reaction:      41  1:1 min Therapeutic Exercise:  [x]  See flow sheet   Rationale:      increase ROM to improve the patients ability to improve functional abilities      20  1:1 min Manual Therapy: STM/tissue mobs to R cervical/upper trap musculature, sidelying scapular glides, extensive inhibitory/relaxation techniques, gentle biceps stretching to neutral and extensive multi transitional GH PROM between all planes   Rationale:      decrease pain, increase ROM, increase tissue extensibility, decrease trigger points and increase postural awareness to improve patient's ability to improve functional mobility   The manual therapy interventions were performed at a separate and distinct time from the therapeutic activities interventions. Billed With/As:   [] TE   [] TA   [] Neuro   [] Self Care Patient Education: [x] Review HEP    [] Progressed/Changed HEP based on:   [] positioning   [] body mechanics   [] transfers   [] heat/ice application    [] other:      Other Objective/Functional Measures:  Verbal cueing for proper form/technique with various exercises during treatment today  Reviewed HEP, ergonomics and relaxation techniques    Post Treatment Pain Level (on 0 to 10) scale:   2  / 10     ASSESSMENT  Assessment/Changes in Function:   Pt still has difficulty with relaxing involved UE with therex especially with pendulums and seated forearm exercises. Pt also presented with increased apprehension and guarding with all passive stretching in all planes even with performing extensive inhibitory/relaxation techniques and multi transitional passive stretching between all planes. Pt was able to achieve flexion and scaption passive end ranges to approximately 90 degrees and elbow extension to near neutral with extensive manual/passive stretching today. Will continue to progress/advance patient within current POC as tolerated with monitoring symptoms. []  See Progress Note/Recertification   Patient will continue to benefit from skilled PT services to modify and progress therapeutic interventions, address functional mobility deficits, address ROM deficits, analyze and address soft tissue restrictions, analyze and cue movement patterns, analyze and modify body mechanics/ergonomics, assess and modify postural abnormalities and instruct in home and community integration to attain remaining goals. Progress toward goals / Updated goals: · Short Term Goals: To be accomplished in  1  weeks:  1.  Pt will be independent and compliant with HEP  Status at last assessment :Pt repots compliance. 5/3/22: Met, Pt reports independence and compliance with current HEP 2-3x/day  · Long Term Goals: To be accomplished in  8-12  treatments:  1.  Patient will increase FOTO score to 64/100 for indications of increased functional mobility.    Status at last assessment : 38/100   2.  Patient will demo PROM shoulder flexion to 90 deg per protocol for progression of joint mobility   Status at last assessment : 13 deg   3. Patient will demo PROM ER to 40 deg per protocol for progression to decrease impingement risk   Status at last assessment :lacking 15   4. Patient will demo PROM elbow 0-130 for ease with progression to feeding   Status at last assessment :5-114      PLAN  [x]  Upgrade activities as tolerated yes Continue plan of care   []  Discharge due to :    []  Other:      Therapist: Sarah Crespo PTA    Date: 5/3/2022 Time: 1:19 PM     Future Appointments   Date Time Provider Jami Parrish   5/5/2022  3:30 PM Thomas Jones, PT [facility]   5/9/2022 12:15 PM Hilda Corral PT [facility]   5/12/2022 12:15 PM Hilda Corral, PT [facility]   5/16/2022 10:45 AM Hilda Corral PT [facility]   5/19/2022 11:30 AM Hilda Corral PT [facility]   5/23/2022 10:45 AM Darshan Hernandez Michael Mo, PT [facility]   5/26/2022  2:00 PM Graceann Lota., PT [facility]   5/31/2022  2:00 PM Graceann Lota., PT [facility]   6/2/2022  1:15 PM Graceann Lota., PT [facility]   6/7/2022  2:00 PM Graceann Lota., PT [facility]   6/9/2022  1:15 PM Graceann Lota., PT [facility]   6/14/2022  2:00 PM Graceann Lota., PT [facility]   6/16/2022  1:15 PM Graceann Lota., PT [facility]   6/21/2022  2:00 PM Graceann Lota., PT [facility]   6/23/2022  1:15 PM Graceann Lota., PT [facility]   6/28/2022  2:00 PM Graceann Lota., PT [facility]   6/30/2022  1:15 PM Graceann Lota., PT [facility]

## 2022-05-05 ENCOUNTER — HOSPITAL ENCOUNTER (OUTPATIENT)
Dept: PHYSICAL THERAPY | Age: 57
Discharge: HOME | End: 2022-05-05
Payer: COMMERCIAL

## 2022-05-05 PROCEDURE — 97140 MANUAL THERAPY 1/> REGIONS: CPT

## 2022-05-05 PROCEDURE — 97110 THERAPEUTIC EXERCISES: CPT

## 2022-05-05 PROCEDURE — 97016 VASOPNEUMATIC DEVICE THERAPY: CPT

## 2022-05-05 NOTE — PROGRESS NOTES
PT DAILY TREATMENT NOTE     Patient Name: Diamond Coates  Date:2022  : 1965  [x]  Patient  Verified  Payor: BLUE CROSS / Plan: [employer] [facility] / Product Type: PPO /    In time:333  Out time:435  Total Treatment Time (min): 62  Total Timed Codes (min): 52  1:1 Treatment Time (min): 338 - 425 47   Visit #: 5  of 8-10    Treatment Area: Right shoulder pain [M25.511]  Acute post-operative pain [G89.18]    SUBJECTIVE  Pain Level (0-10 scale): 2/10   Any medication changes, allergies to medications, adverse drug reactions, diagnosis change, or new procedure performed?: [x] No    [] Yes (see summary sheet for update)  Subjective functional status/changes:   [] No changes reported  Patient reports doing well over    OBJECTIVE  Modality rationale: decrease edema, decrease inflammation and decrease pain to improve the patients ability to increased activity tolerance and QOL    Min Type Additional Details    [] Estim: []Att   []Unatt        []TENS instruct                  []IFC  []Premod   []NMES                     []Other:  []w/US   []w/ice   []w/heat  Position:  Location:    []  Traction: [] Cervical       []Lumbar                       [] Prone          []Supine                       []Intermittent   []Continuous Lbs:  [] before manual  [] after manual    []  Ultrasound: []Continuous   [] Pulsed                           []1MHz   []3MHz Location:  W/cm2:    []  Iontophoresis with dexamethasone         Location: [] Take home patch   [] In clinic    []  Ice     []  heat  []  Ice massage Position:  Location:   10 [x]  Vasopneumatic Device  If using vaso (only need to measure limb vaso being performed on)      pre-treatment girth :  26      post-treatment girth : 22      measured at (landmark location)  MID BICEP  Pressure:       [x] lo [] med [] hi   Temperature: [x] lo [] med [] hi   [x] Skin assessment post-treatment:  [x]intact []redness- no adverse reaction       []redness - adverse reaction: 32 min Therapeutic Exercise:  [x] See flow sheet :Initiated ther ex per flow sheet    Rationale: increase ROM and improve flexibility to improve the patients ability to progress to improve joint mobility of shoulder and proximal and distal joints     20 min Manual Therapy:  PROM wrist, elbow, shoulder to tolerance    Rationale: decrease pain, increase ROM and increase tissue extensibility to prevent adhesive capsulitis and progress functional reach per protocol   The manual therapy interventions were performed at a separate and distinct time from the therapeutic activities interventions    X with TE/MT min Patient Education: [x] Review HEP - updated pictures       Other Objective/Functional Measures: Therex initiated today - first FU post eval   Elbow PROM 0-150     Return to light work - checking emails    Progressed to G gripper     Pain Level (0-10 scale) post treatment: 0 at rest     ASSESSMENT/Changes in Function: Patient progressing slowly jay protocol. Able to manage pain with prescription pain meds at night and Tylenol during the day . Saw PA Monday and took stitches out. Patient and PT  feels VASO is beneficial post treatment for pain management - low compression only tolerated. Improved tolerance to MT with talking through it . Updated HEP pictures per hard chart. Adjusted elbow strap on brace as patient feels she is constantly trying to hold the arm up despite her awareness of the poor posture. Patient will continue to benefit from skilled PT services to modify and progress therapeutic interventions, address functional mobility deficits, address ROM deficits, address strength deficits, analyze and address soft tissue restrictions, analyze and cue movement patterns, analyze and modify body mechanics/ergonomics and assess and modify postural abnormalities to attain remaining goals.      [x]  See Plan of Care  []  See progress note/recertification  []  See Discharge Summary Progress towards goals / Updated goals: · Short Term Goals: To be accomplished in  1  weeks:  1.  Pt will be independent and compliant with HEP  Status at last assessment :Pt repots compliance. 5/3/22: Met, Pt reports independence and compliance with current HEP 2-3x/day    · Long Term Goals: To be accomplished in  8-12  treatments:  1.  Patient will increase FOTO score to 64/100 for indications of increased functional mobility.    Status at last assessment : 38/100     2.  Patient will demo PROM shoulder flexion to 90 deg per protocol for progression of joint mobility   Status at last assessment : 13 deg     3. Patient will demo PROM ER to 40 deg per protocol for progression to decrease impingement risk   Status at last assessment :lacking 15     4. Patient will demo PROM elbow 0-130 for ease with progression to feeding   Status at last assessment :5-114   Current: 0-150 deg 5/5/2022    PLAN  [x]  Upgrade activities as tolerated     []  Continue plan of care  [x]  Update interventions per flow sheet       []  Discharge due to:_  [x]  Other:_assess response to tx  Add more scap mobility - sh rolls/ elevation/ depression     Amarilis Grider, PT 5/5/2022   Future Appointments   Date Time Provider Jami Parrish   5/5/2022  3:30 PM Vernette Emlyn., PT [facility]   5/9/2022 12:15 PM Reta Wilcox, PT [facility]   5/12/2022 12:15 PM Reta Wilcox, PT [facility]   5/16/2022 10:45 AM Reta Wilcox, PT [facility]   5/19/2022 11:30 AM Reta Wilcox, PT [facility]   5/23/2022 10:45 AM Reta Wilcox, PT [facility]   5/26/2022  2:00 PM Vernette Dustin., PT [facility]   5/31/2022  2:00 PM Vernette Emlyn., PT [facility]   6/2/2022  1:15 PM Vernette Dustin., PT [facility]   6/7/2022  2:00 PM Vernette Emlyn., PT [facility]   6/9/2022  1:15 PM Vernette Dustin., PT [facility]   6/14/2022  2:00 PM Vernette Dustin., PT [facility]   6/16/2022  1:15 PM Vernette Emlyn., PT [facility]   6/21/2022  2:00 PM Jolanta Eth., PT [facility]   6/23/2022  1:15 PM Jolanta Eth., PT [facility]   6/28/2022  2:00 PM Jolanta Eth., PT [facility]   6/30/2022  1:15 PM Jolanta Eth., PT [facility]

## 2022-05-09 ENCOUNTER — HOSPITAL ENCOUNTER (OUTPATIENT)
Dept: PHYSICAL THERAPY | Age: 57
Discharge: HOME | End: 2022-05-09
Payer: COMMERCIAL

## 2022-05-09 PROCEDURE — 97140 MANUAL THERAPY 1/> REGIONS: CPT

## 2022-05-09 PROCEDURE — 97110 THERAPEUTIC EXERCISES: CPT

## 2022-05-09 PROCEDURE — 97016 VASOPNEUMATIC DEVICE THERAPY: CPT

## 2022-05-09 NOTE — PROGRESS NOTES
PT DAILY TREATMENT NOTE     Patient Name: Aleen Essex  Date:2022  : 1965  [x]  Patient  Verified  Payor: BLUE CROSS / Plan: [employer] [facility] / Product Type: PPO /    In time:12:19  Out time:1:20  Total Treatment Time (min): 61  Visit #: 6 of 8-10     Medicare/BCBS Only   Total Timed Codes (min): 51 1:1 Treatment Time:  51      Treatment Area: Right shoulder pain [M25.511]  Acute post-operative pain [G89.18]    SUBJECTIVE  Pain Level (0-10 scale): 1  Any medication changes, allergies to medications, adverse drug reactions, diagnosis change, or new procedure performed?: [x] No    [] Yes (see summary sheet for update)  Subjective functional status/changes:   [] No changes reported  \"after th elast PT session I was having spasms (Noted in the anterior deltoid) and the pain was high\"  When asked about elbow pt notes \"It wakes me up and I have to take the elbow out of the sling. Feels like my elbow needs to straighten out. \"       OBJECTIVE  Modality rationale: decrease edema, decrease inflammation and decrease pain to improve the patients ability to decrease DOMS, improve ROM    Min Type Additional Details   10 [x]  Vasopneumatic Device:  If using vaso (only need to measure limb vaso being performed on)      pre-treatment girth :  27cm       post-treatment girth :  26.5      measured at (landmark location)  Axilla / proximal humerus   Pressure: [x] lo [] med [] hi   Temp: [x] lo [] med [] hi   [x] Skin assessment post-treatment:  [x]intact []redness- no adverse reaction       []redness - adverse reaction:       31 min Therapeutic Exercise:  [x] See flow sheet :    Scapular mobility: A/P rolls in sitting with UE supported on pillow  Wrist flexion/extension supported with pillow, against gravity. Rationale: increase ROM and increase strength to [id number] the patients ability to reach, self-care, ADLs    20 min Manual Therapy:   R GHJ distraction for pain management.  PROM wrist, elbow, shoulder to tolerance; DTM to R UT after PROM. Rationale: decrease pain, increase ROM and increase tissue extensibility to improve reaching, self care, dressing  The manual therapy interventions were performed at a separate and distinct time from the therapeutic activities interventions   (na Add 59 Modifier in conjunction with TA treatment)            x min Patient Education: [x] Review HEP    [] Progressed/Changed HEP based on:     -Advised able to come out of sling at home with increased frequency to avoid elbow flexion contracture and to decrease c/o pain. -pain sciences education   -advised on use of mirror feedback for scapular ROM     [] positioning   [] body mechanics   [] transfers   [] heat/ice application        Other Objective/Functional Measures:   Pt 2 weeks and 6 days post-operative  Pain ranges 1 to 5/10 (worst after PT last week)    PROM R shoulder flexion: 80 deg; ER 5 deg; Scaption: 50 deg    AROM R supination: 69 deg     Notes some numbness in the distal lateral arm and into the R wrist  Possible involvement of lateral antibrachial cutaneous n: advised on how to bridge the area to avoid pressure with use of the sling      Pain Level (0-10 scale) post treatment: 1    ASSESSMENT/Changes in Function: Requires VC to avoid fear avoidance with scpular rolls. Demo's approx 25 to 50% AROM R shoulder vs the L during shoulder rolls. Discomfort in the R anterior shoulder after MT; resolved with cryotherapy. Pt co'nt to be fearful and guarded with movements. Progressing with PROM R shoulder elevation but limited with major guarding into ER.       Patient will continue to benefit from skilled PT services to modify and progress therapeutic interventions, address functional mobility deficits, address ROM deficits, address strength deficits, analyze and address soft tissue restrictions, analyze and cue movement patterns, analyze and modify body mechanics/ergonomics, assess and modify postural abnormalities and instruct in home and community integration to attain remaining goals. []  See Plan of Care  []  See progress note/recertification  []  See Discharge Summary         Progress towards goals / Updated goals: · Short Term Goals: To be accomplished in  1  weeks:  1.  Pt will be independent and compliant with HEP  Status at last assessment :Pt repots compliance. 5/3/22: Met, Pt reports independence and compliance with current HEP 2-3x/day     · Long Term Goals: To be accomplished in  8-12  treatments:  1.  Patient will increase FOTO score to 64/100 for indications of increased functional mobility. Status at last assessment : 38/100      2.  Patient will demo PROM shoulder flexion to 90 deg per protocol for progression of joint mobility   Status at last assessment : 13 deg  Current: goal progressing with 80 deg PROM after MT (5/9/22)      3. Patient will demo PROM ER to 40 deg per protocol for progression to decrease impingement risk   Status at last assessment :lacking 15  Current: slow progress (5/9/22)     4. Patient will demo PROM elbow 0-130 for ease with progression to feeding   Status at last assessment :5-114   Current: 0-150 deg 5/5/2022    PLAN  [x]  Upgrade activities as tolerated     [x]  Continue plan of care  []  Update interventions per flow sheet       []  Discharge due to:_  [x]  Other:_ add wrist  Weight NV.    Assess response to kumar La, PT 5/9/2022  8:18 AM    Future Appointments   Date Time Provider Jami Parrish   5/9/2022 12:15 PM Lory Amaya, PT [facility]   5/12/2022 12:15 PM Lory Amaya, PT [facility]   5/16/2022 10:45 AM Lory Amaya, PT [facility]   5/19/2022 11:30 AM Lory Amaya, PT [facility]   5/23/2022 10:45 AM Lory Amaya, PT [facility]   5/26/2022  2:00 PM Kamla Spivey, PT [facility]   5/31/2022  2:00 PM [facility]more., [facility]   6/2/2022  1:15 PM Kamla Gilmore., [facility]   6/7/2022  2:00 PM Marcus Brian N., PT [facility]   6/9/2022  1:15 PM Jennett Comber., PT [facility]   6/14/2022  2:00 PM Jennett Comber., PT [facility]   6/16/2022  1:15 PM Jennett Comber., PT [facility]   6/21/2022  2:00 PM Jennett Comber., PT [facility]   6/23/2022  1:15 PM Jennett Comber., PT [facility]   6/28/2022  2:00 PM Jennett Comber., PT [facility]   6/30/2022  1:15 PM Jennett Comber., PT [facility]

## 2022-05-12 ENCOUNTER — HOSPITAL ENCOUNTER (OUTPATIENT)
Dept: PHYSICAL THERAPY | Age: 57
Discharge: HOME | End: 2022-05-12
Payer: COMMERCIAL

## 2022-05-12 PROCEDURE — 97140 MANUAL THERAPY 1/> REGIONS: CPT

## 2022-05-12 PROCEDURE — 97016 VASOPNEUMATIC DEVICE THERAPY: CPT

## 2022-05-12 PROCEDURE — 97110 THERAPEUTIC EXERCISES: CPT

## 2022-05-12 NOTE — PROGRESS NOTES
PT DAILY TREATMENT NOTE     Patient Name: Meredith Dominguez  Date:2022  : 1965  [x]  Patient  Verified  Payor: YE RADHA / Plan: Genoveva Stallings / Product Type: PPO /    In time:12:20  Out time: 1:16  Total Treatment Time (min):  56  Visit #: 7 of 8-10     Medicare/BCBS Only   Total Timed Codes (min): 44 1:1 Treatment Time:  44      Treatment Area: Right shoulder pain [M25.511]  Acute post-operative pain [G89.18]    SUBJECTIVE  Pain Level (0-10 scale): <1/10   Any medication changes, allergies to medications, adverse drug reactions, diagnosis change, or new procedure performed?: [x] No    [] Yes (see summary sheet for update)  Subjective functional status/changes:   [] No changes reported  Felt pretty good after the last session. I've tried to come out of sling more too. Then that night or the next night it was not good. Yesterday was a wash and I was in the ED with my son. Called my MD because I can't sleep and I'd like a sleep aid    Pt 5 min late for treatment. OBJECTIVE  Modality rationale: decrease edema, decrease inflammation and decrease pain to improve the patients ability to decrease DOMS, improve ROM    Min Type Additional Details   12+ 2 set-up  [x]  Vasopneumatic Device:  If using vaso (only need to measure limb vaso being performed on)      pre-treatment girth :  29cm (over shirt)        post-treatment girth :  28.4cm       measured at (landmark location)  Axilla / proximal humerus   Pressure: [x] lo [] med [] hi   Temp: [x] lo [] med [] hi   [x] Skin assessment post-treatment:  [x]intact []redness- no adverse reaction       []redness - adverse reaction:       10 min Therapeutic Exercise:  [x] See flow sheet :    Scapular mobility: A/P rolls in sitting with UE supported on pillow  Wrist flexion/extension supported with pillow, against gravity.       Rationale: increase ROM and increase strength to [id number] the patients ability to reach, self-care, ADLs    34 min Manual Therapy:   DTM to R UT, LS.  SCJ mobilizations; R GHJ distraction for pain management. PROM wrist, elbow, shoulder to tolerance. L SL scapular mobs in all planes   Rationale: decrease pain, increase ROM and increase tissue extensibility to improve reaching, self care, dressing  The manual therapy interventions were performed at a separate and distinct time from the therapeutic activities interventions   (na Add 59 Modifier in conjunction with TA treatment)            x min Patient Education: [x] Review HEP    [] Progressed/Changed HEP based on:     -Advised able to come out of sling at home to avoid elbow contracture. Best supported with chair or supine. -pain sciences education   -advised on use of mirror feedback for scapular ROM  -avoid too much HEP and resulting mm soreness/trigger points  -Use of ice or heat as needed for pain management      [] positioning   [] body mechanics   [] transfers   [x] heat/ice application        Other Objective/Functional Measures:     PROM R shoulder flexion: 85 deg; ER 8 deg; Scaption: 62 deg        Pain Level (0-10 scale) post treatment: 0    ASSESSMENT/Changes in Function: pt con't with firm end feel and very restricted mobility between the humeral head and the scapula. Minor increases in ROM today. Pt co'nt to have fair tolerance to MT but better when talking. Verbalize understanding of not over-doing HEP at home to avoid mm soreness. Patient will continue to benefit from skilled PT services to modify and progress therapeutic interventions, address functional mobility deficits, address ROM deficits, address strength deficits, analyze and address soft tissue restrictions, analyze and cue movement patterns, analyze and modify body mechanics/ergonomics, assess and modify postural abnormalities and instruct in home and community integration to attain remaining goals.      []  See Plan of Care  []  See progress note/recertification  []  See Discharge Summary         Progress towards goals / Updated goals: · Short Term Goals: To be accomplished in  1  weeks:  1.  Pt will be independent and compliant with HEP  Status at last assessment :Pt repots compliance. 5/3/22: Met, Pt reports independence and compliance with current HEP 2-3x/day     · Long Term Goals: To be accomplished in  8-12  treatments:  1.  Patient will increase FOTO score to 64/100 for indications of increased functional mobility.    Status at last assessment : 38/100      2.  Patient will demo PROM shoulder flexion to 90 deg per protocol for progression of joint mobility   Status at last assessment : 13 deg  Current: goal progressing with 85 deg deg PROM after MT (5/9/22)      3. Patient will demo PROM ER to 40 deg per protocol for progression to decrease impingement risk   Status at last assessment :lacking 15  Current: pt has 7 deg  (5/12/22)     4. Patient will demo PROM elbow 0-130 for ease with progression to feeding   Status at last assessment :5-114   Current: 0-150 deg 5/5/2022    PLAN  [x]  Upgrade activities as tolerated     [x]  Continue plan of care  []  Update interventions per flow sheet       []  Discharge due to:_  [x]  Other:_ .  con't to progress ROM within protocol guidelines and to pt tolerance without increasing c/o pain     Manuela Joseph, PT 5/12/2022  8:18 AM    Future Appointments   Date Time Provider Jami Parrish   5/12/2022 12:15 PM Billy Flattery, PT [facility]   5/16/2022 10:45 AM Billy Flattery, PT [facility]   5/19/2022 11:30 AM Billy Flattery, PT [facility]   5/23/2022 10:45 AM Billy Flattery, PT [facility]   5/26/2022  2:00 PM Mabeline Heading., PT [facility]   5/31/2022  2:00 PM Mabeline Heading., PT [facility]   6/2/2022  1:15 PM [address] [place] [address] [facility]   6/7/2022  2:00 PM Mabeline Heading., PT [facility]   6/9/2022  1:15 PM Mabeline Heading., PT [facility]   6/14/2022  2:00 PM Mabeline Heading., PT [facility]   6/16/2022  1:15 PM Mabeline Heading., PT [facility]   6/21/2022  2:00 PM Harlen Chrissie., PT [facility]   6/23/2022  1:15 PM Harlen Chrissie., PT [facility]   6/28/2022  2:00 PM Harlen Chrissie., PT [facility]   6/30/2022  1:15 PM Harlen Chrissie., PT [facility]

## 2022-05-16 ENCOUNTER — HOSPITAL ENCOUNTER (OUTPATIENT)
Dept: PHYSICAL THERAPY | Age: 57
Discharge: HOME | End: 2022-05-16
Payer: COMMERCIAL

## 2022-05-16 PROCEDURE — 97140 MANUAL THERAPY 1/> REGIONS: CPT

## 2022-05-16 PROCEDURE — 97016 VASOPNEUMATIC DEVICE THERAPY: CPT

## 2022-05-16 PROCEDURE — 97110 THERAPEUTIC EXERCISES: CPT

## 2022-05-16 NOTE — PROGRESS NOTES
PT DAILY TREATMENT NOTE     Patient Name: Janne Favre  [id number]  : 1965  [x]  Patient  Verified  Payor: BLUE CROSS / Plan: [employer] [facility] / Product Type: PPO /    In time:10:50  Out time: 11:44  Total Treatment Time (min): 54  Visit #: 8 of 8-10     Medicare/BCBS Only   Total Timed Codes (min): 39 1:1 Treatment Time:  39      Treatment Area: Right shoulder pain [M25.511]  Acute post-operative pain [G89.18]    SUBJECTIVE  Pain Level (0-10 scale): 1-2  Any medication changes, allergies to medications, adverse drug reactions, diagnosis change, or new procedure performed?: [x] No    [] Yes (see summary sheet for update)  Subjective functional status/changes:   [] No changes reported  Pt notes she can't stay out of the sling unless she is sitting because it was such a large repair. MD did not give her any medications for sleep but she will call PCP. Pt is 3 weeks 6 days post operative       OBJECTIVE  Modality rationale: decrease edema, decrease inflammation and decrease pain to improve the patients ability to decrease DOMS, improve ROM    Min Type Additional Details   15 min   [x]  Vasopneumatic Device:  If using vaso (only need to measure limb vaso being performed on)      pre-treatment girth :  24cm      post-treatment girth :  23.5cm      measured at (landmark location) : mid nathan    Pressure: [x] lo [] med [] hi   Temp: [x] lo [] med [] hi   [x] Skin assessment post-treatment:  [x]intact []redness- no adverse reaction       []redness - adverse reaction:       16 min Therapeutic Exercise:  [x] See flow sheet :    Scapular mobility: A/P rolls in sitting with UE supported on pillow  Wrist flexion/extension supported with pillow, against gravity. Rationale: increase ROM and increase strength to [id number] the patients ability to reach, self-care, ADLs    23 min Manual Therapy:   DTM to R UT, LS prior to MT.  SCJ mobilizations; R GHJ distraction (gentle grade I) for pain management. PROM wrist, elbow, shoulder to tolerance. L SL scapular mobs in all planes; radial head glides for supination. Wrist mobs for flexion and supination. Rationale: decrease pain, increase ROM and increase tissue extensibility to improve reaching, self care, dressing  The manual therapy interventions were performed at a separate and distinct time from the therapeutic activities interventions   (na Add 59 Modifier in conjunction with TA treatment)            x min Patient Education: [x] Review HEP    [] Progressed/Changed HEP based on:     -Advised able to come out of sling at home only when sitting, to avoid elbow contracture.  -advised on use of mirror feedback for scapular ROM  -Use of ice or heat as needed for pain management      [] positioning   [] body mechanics   [] transfers   [x] heat/ice application        Other Objective/Functional Measures:     PROM R shoulder flexion: 85 deg; ER 0 deg (after MT); abduction (no ER): 55 deg        Pain Level (0-10 scale) post treatment: 0    ASSESSMENT/Changes in Function:  Pt progressing better with pain management but slowly with ROM gains nir into ER. Pt does note chronic involvement of the shoulder and disuse prior to surgery which could account for difficulty in regaining ROM. Firm end feel all planes. Pt not a candidate for aggressive MT to regain ROM due to massive tear. Patient will continue to benefit from skilled PT services to modify and progress therapeutic interventions, address functional mobility deficits, address ROM deficits, address strength deficits, analyze and address soft tissue restrictions, analyze and cue movement patterns, analyze and modify body mechanics/ergonomics, assess and modify postural abnormalities and instruct in home and community integration to attain remaining goals. []  See Plan of Care  []  See progress note/recertification  []  See Discharge Summary         Progress towards goals / Updated goals:   · Short Term Goals: To be accomplished in  1  weeks:  1.  Pt will be independent and compliant with HEP  Status at last assessment :Pt repots compliance. 5/3/22: Met, Pt reports independence and compliance with current HEP 2-3x/day     · Long Term Goals: To be accomplished in  8-12  treatments:  1.  Patient will increase FOTO score to 64/100 for indications of increased functional mobility.    Status at last assessment : 38/100      2.  Patient will demo PROM shoulder flexion to 90 deg per protocol for progression of joint mobility   Status at last assessment : 13 deg  Current: goal progressing with 85 deg deg PROM after MT (5/9/22)      3. Patient will demo PROM ER to 40 deg per protocol for progression to decrease impingement risk   Status at last assessment :lacking 15  Current: pt has 7 deg  (5/12/22); regressed today to 0 after MT (5/16/22)     4. Patient will demo PROM elbow 0-130 for ease with progression to feeding   Status at last assessment :5-114   Current: 0-150 deg 5/5/2022     PLAN  [x]  Upgrade activities as tolerated     [x]  Continue plan of care  []  Update interventions per flow sheet       []  Discharge due to:_  [x]  Other:_ .  con't to progress ROM within protocol guidelines and to pt tolerance without increasing c/o pain     Orlin Ordonez, PT 5/16/2022  8:18 AM    Future Appointments   Date Time Provider Jami Parrish   5/16/2022 10:45 AM Emerald Beckham, PT [facility]   5/19/2022 11:30 AM Emerald Beckham PT [facility]   5/23/2022 10:45 AM Emerald Beckham, PT [facility]   5/26/2022  2:00 PM Lakisha Prajapati., PT [facility]   5/31/2022  2:00 PM Lakisha Prajapati., PT [facility]   6/2/2022  1:15 PM [address] [place] [address] [facility]   6/7/2022  2:00 PM Hildred Alo., PT [facility]   6/9/2022  1:15 PM Hildred Alo., PT [facility]   6/14/2022  2:00 PM Hildred Alo., PT [facility]   6/16/2022  1:15 PM Hildred Alo., PT [facility]   6/21/2022  2:00 PM Philip Melo., PT [facility] 6/23/2022  1:15 PM Lakisha Prajapati., PT [facility]   6/28/2022  2:00 PM Lakisha Prajapati., PT [facility]   6/30/2022  1:15 PM Philip Townsend., PT [facility]

## 2022-05-19 ENCOUNTER — HOSPITAL ENCOUNTER (OUTPATIENT)
Dept: PHYSICAL THERAPY | Age: 57
Discharge: HOME | End: 2022-05-19
Payer: COMMERCIAL

## 2022-05-19 PROCEDURE — 97110 THERAPEUTIC EXERCISES: CPT

## 2022-05-19 PROCEDURE — 97016 VASOPNEUMATIC DEVICE THERAPY: CPT

## 2022-05-19 PROCEDURE — 97140 MANUAL THERAPY 1/> REGIONS: CPT

## 2022-05-19 NOTE — PROGRESS NOTES
PT DAILY TREATMENT NOTE     Patient Name: Jose Casey  [id number]  : 1965  [x]  Patient  Verified  Payor: BLUE CROSS / Plan: [employer] [facility] / Product Type: PPO /    In time:11:34  Out time: 12:28  Total Treatment Time (min): 54  Visit #: 9 of 8-10     Medicare/BCBS Only   Total Timed Codes (min): 42 1:1 Treatment Time:  42      Treatment Area: Right shoulder pain [M25.511]  Acute post-operative pain [G89.18]    SUBJECTIVE  Pain Level (0-10 scale): 3  Any medication changes, allergies to medications, adverse drug reactions, diagnosis change, or new procedure performed?: [x] No    [] Yes (see summary sheet for update)  Subjective functional status/changes:   [] No changes reported  Pt is 4 weeks 2 days post operative       OBJECTIVE  Modality rationale: decrease edema, decrease inflammation and decrease pain to improve the patients ability to decrease DOMS, improve ROM    Min Type Additional Details   10  + 2 setup   [x]  Vasopneumatic Device:  If using vaso (only need to measure limb vaso being performed on)      pre-treatment girth :  27.,5cm      post-treatment girth : 26.5cm       measured at (landmark location) : deltoid tuberosity    Pressure: [x] lo [] med [] hi   Temp: [x] lo [] med [] hi   [x] Skin assessment post-treatment:  [x]intact []redness- no adverse reaction       []redness - adverse reaction:       24 min Therapeutic Exercise:  [x] See flow sheet :    Scapular mobility: A/P rolls in sitting with UE supported on pillow  Wrist flexion/extension supported with pillow, against gravity. Assessment for PN      Rationale: increase ROM and increase strength to [id number] the patients ability to reach, self-care, ADLs    18 min Manual Therapy:   SCJ mobilizations; R GHJ distraction (gentle grade I) for pain management. PROM wrist, elbow, shoulder to tolerance.  L SL scapular mobs in all planes/     Rationale: decrease pain, increase ROM and increase tissue extensibility to improve reaching, self care, dressing  The manual therapy interventions were performed at a separate and distinct time from the therapeutic activities interventions   (na Add 59 Modifier in conjunction with TA treatment)            x min Patient Education: [x] Review HEP    [] Progressed/Changed HEP based on:     -Advised able to come out of sling at home only when sitting, to avoid elbow contracture.  -advised on use of mirror feedback for scapular ROM  -Use of ice or heat as needed for pain management    -Pt inquired about going to [facility] for 2 weeks during her 7th week post-operative.  Discussed benefit of PT there - would require an evaluation   -will increase HEP dramatically at week 6 per protocol   -seated T/S extension over foam roller/towel with arm in sling     [] positioning   [] body mechanics   [] transfers   [x] heat/ice application        Other Objective/Functional Measures:     Pain ranges: 0 to 3/10 at home with ADLs   Described as : constantly achy, tired and heavy; bicep feeling \"strained\"; some increased during PROM in PT  Location of pain: shoulder; anterior bicep, deltoid tuberosity; sensitivity in distal lateral forearm over radius'; tightness in fingers  Improvements:  showering  Deficits: use of the arm; sleeping through the night; sleeping in bed; still in the sling; driving; working , using utensils, writing   Pt goal:  \"get some of the achy-ness gone, but I don't think that is realistic; ROM - feeling more confident that i'm on par with where I should be with the outer rotation\"    Posture: in sling on the R   ROM:  L WFL                                       PROM R GHJ     Right   Flexion 87   Extension  25   Scaption/ABD 58 deg    ER @ 0 Deg ABD 5 deg    IR/ADD (FIR) IR to 40 deg at 20 abd   CS - general stiffness ; decreased 25% throughout   Elbow PROM: 0 to 138 deg  Sup//pro 90 deg each   Strength:     NT sec to post op protocol                                                              strength :  L 62, 63#; R 30, 38#  . Pain Level (0-10 scale) post treatment: 0    ASSESSMENT/Changes in Function:  See PN     Patient will continue to benefit from skilled PT services to modify and progress therapeutic interventions, address functional mobility deficits, address ROM deficits, address strength deficits, analyze and address soft tissue restrictions, analyze and cue movement patterns, analyze and modify body mechanics/ergonomics, assess and modify postural abnormalities and instruct in home and community integration to attain remaining goals. []  See Plan of Care  [x]  See progress note/recertification  []  See Discharge Summary         Progress towards goals / Updated goals: · Short Term Goals: To be accomplished in  1  weeks:  1.  Pt will be independent and compliant with HEP  Status at last assessment :Pt repots compliance. 5/3/22: Met, Pt reports independence and compliance with current HEP 2-3x/day     · Long Term Goals: To be accomplished in  8-12  treatments:  1.  Patient will increase FOTO score to 64/100 for indications of increased functional mobility.    Status at last assessment : 38/100   Current: not tested yet due to PROM phase of rehab and unlikely to change dramatically (5/19/22)     2.  Patient will demo PROM shoulder flexion to 90 deg per protocol for progression of joint mobility   Status at last assessment : 13 deg  Current: goal progressing with 87 deg deg PROM after MT (5/19/22)      3. Patient will demo PROM ER to 40 deg per protocol for progression to decrease impingement risk   Status at last assessment :lacking 15  Current: Pt progressing slowly with 5 deg PROM ER (5/19/22)     4. Patient will demo PROM elbow 0-130 for ease with progression to feeding   Status at last assessment :5-114   Current: 0-138 deg 5/19/2022     PLAN  [x]  Upgrade activities as tolerated     [x]  Continue plan of care  []  Update interventions per flow sheet       []  Discharge due to:_  [x] Other:_ .  con't to progress ROM within protocol guidelines and to pt tolerance without increasing c/o pain     Clarisa Ibrahim, PT 5/19/2022  8:18 AM    Future Appointments   Date Time Provider Jami Shivani   5/19/2022 11:30 AM Scott Zuleta, PT [facility]   5/23/2022 10:45 AM Scott Zuleta, PT [facility]   5/26/2022  2:00 PM Tonnie Rival., PT [facility]   5/31/2022  2:00 PM Tonnie Rival., PT [facility]   6/2/2022  1:15 PM [address] [place] [address] [facility]   6/7/2022  2:00 PM Tonnie Rival., PT [facility]   6/9/2022  1:15 PM Tonnie Rival., PT [facility]   6/14/2022  2:00 PM Tonnie Rival., PT [facility]   6/16/2022  1:15 PM Tonnie Rival., PT [facility]   6/21/2022  2:00 PM Tonnie Rival., PT [facility]   6/23/2022  1:15 PM Tonnie Rival., PT [facility]   6/28/2022  2:00 PM Tonnie Rival., PT [facility]   6/30/2022  1:15 PM Tonnie Rival., PT [facility]

## 2022-05-19 NOTE — PROGRESS NOTES
[address] [facility] AT [address]. [facility] [address]  Phone: [phone] Fax: [phone]  PROGRESS NOTE  Patient Name: Chapis Peoples : 1965   Treatment/Medical Diagnosis: Right shoulder pain [M25.511]  Acute post-operative pain [G89.18]   Referral Source: Karen Adams*     Date of Initial Visit: 22; DOS 2022 Attended Visits: 9 Missed Visits: 0     SUMMARY OF TREATMENT  Pt is a 61y.o. year old female who presents with  s/p R shoulder large RTC repair and biceps tenodesis DOS 2022. Ther ex including distal wrist strengthening and ROM; elbow and shoulder PROM: scapular activation;  strength; manual therapy including:scapular mobs; PROM R shoulder in protocol, PROM elbow and wrist as needed; STM to mid to proximal UT and LS (no MFR to surgically involved mm) for pain relief;  Patient education; HEP, cryotherapy for edema and pain management      CURRENT STATUS  Pt presents today 4 weeks 2 days post operative. Pt is making improved progress with pain management, intermittent improved sleep (still significantly limited), approaching goals with PROM flexion and abduction as well as normalized movement of the elbow and wrist. She con't to require postural training to avoid protracted and winging scapula on the R.   SHE HAS VERY LIMITED PROM ER AND IR WITH FIRM END FEEL. PLEASE ADVISE IF WE NEED TO PUSH THESE ROM OR IF Pt/PT OK TO GRADUALLY RESUME ROM TO TOLERANCE.       Pain ranges: 0 to 3/10 at home with ADLs   Described as : constantly achy, tired and heavy; bicep feeling \"strained\"; some increased during PROM in PT  Location of pain: shoulder; anterior bicep, deltoid tuberosity; sensitivity in distal lateral forearm over radius'; tightness in fingers  Improvements:  showering, some light self-care  Deficits: use of the arm; sleeping through the night; sleeping in bed propped upright with pillows; still in the sling; driving; working, using utensils, writing   Pt goal:  \"get some of the achy-ness gone, but I don't think that is realistic; ROM - feeling more confident that i'm on par with where I should be with the outer rotation\"     Posture: in sling on the R; out of sling pt demo's protraction, anterior tipping and winging of the scapula ; R shoulder depression  ROM:  L WFL                                       PROM R GHJ    Right   Flexion 87   Extension  25 (w elbow flexion)   ABD (no rotation) 58 deg    ER @ 0 Deg ABD 5 deg   IR/ADD (FIR) IR to 40 deg at 20 abd   CS - general stiffness ; decreased 25% throughout   Elbow PROM: 0 to 138 deg  Sup//pro 90 deg each   Strength:     NT sec to post op protocol                                                              strength :  L 62, 63#; R 30, 38#  Neuro: pt notes min patch of light numbness over the distal distribution of the lateral antibrachial cutaneous n / C6 /musculocutaneous      Goals for this period include:   · Short Term Goals: To be accomplished in  1  weeks:  1.  Pt will be independent and compliant with HEP  Status at last assessment :Pt repots compliance. 5/3/22: Met, Pt reports independence and compliance with current HEP 2-3x/day     · Long Term Goals: To be accomplished in  8-12  treatments:  1.  Patient will increase FOTO score to 64/100 for indications of increased functional mobility.    Status at last assessment : 38/100   Current: not tested yet due to PROM phase of rehab and unlikely to change dramatically (5/19/22)     2.  Patient will demo PROM shoulder flexion to 90 deg per protocol for progression of joint mobility   Status at last assessment : 13 deg  Current: goal progressing with 87 deg deg PROM after MT (5/19/22)      3. Patient will demo PROM ER to 40 deg per protocol for progression to decrease impingement risk   Status at last assessment :lacking 15  Current: Pt progressing slowly with 5 deg PROM ER (5/19/22)     4. Patient will demo PROM elbow 0-130 for ease with progression to feeding   Status at last assessment :5-114   Current: 0-138 deg 5/19/2022       New Goals to be achieved in __8-12__  treatments:  · Short Term Goals: To be accomplished in  1  weeks:  1.  Pt will be independent and compliant with HEP to progress to DC  Status at last assessment :Pt reports independence and compliance with current HEP 2-3x/day  2.  Patient will increase FOTO score to 64/100 for indications of increased functional mobility. Status at last assessment : 38/100    3.  Patient will demo PROM shoulder flexion to 90 deg per protocol for progression of joint mobility   Status at last assessment : 87 deg deg PROM after MT  4 Patient will demo PROM ER to 40 deg per protocol for progression to decrease impingement risk   Status at last assessment : Pt progressing slowly with 5 deg PROM ER    PHASE III goals:  1. Pt will have an increase in AROM of the R GHJ to > or = 90 deg elevation with normal scapulohumeral rhythm to restore ADLs and driving   2. Pt will have > or = 4/5 MMT of R GHJ flexion, scaption, ER and IR to begin to restore ADLs, self-care, dressing  3. Pt will tolerate a full week out of the sling with no pain and with normal standing posture to improve prognosis for return to PLOF. RECOMMENDATIONS  Recommend con't PT 1-2x/week for 8-12 sessions with a progression to next phase (III) of rehab at 6 weeks for AAROM to AROM as tolerated, scapular stability and strength at 8 weeks as appropriate. Thank you     If you have any questions/comments please contact us directly at [phone]. Thank you for allowing us to assist in the care of your patient. Therapist Signature: Marjan English DPT Date: 5/19/2022   Reporting period: 4/21/22 to 5/19/22  Time: 9:36 AM   NOTE TO PHYSICIAN:  PLEASE COMPLETE THE ORDERS BELOW AND FAX TO   [facility] at [facility]: [phone].   If you are unable to process this request in 24 hours please contact our office: [phone].  ___ I have read the above report and request that my patient continue as recommended.   ___ I have read the above report and request that my patient continue therapy with the following changes/special instructions:_________________________________________________________   ___ I have read the above report and request that my patient be discharged from therapy.      Physician Signature:        Date:       Time:

## 2022-05-23 ENCOUNTER — HOSPITAL ENCOUNTER (OUTPATIENT)
Dept: PHYSICAL THERAPY | Age: 57
Discharge: HOME | End: 2022-05-23
Payer: COMMERCIAL

## 2022-05-23 PROCEDURE — 97140 MANUAL THERAPY 1/> REGIONS: CPT

## 2022-05-23 PROCEDURE — 97016 VASOPNEUMATIC DEVICE THERAPY: CPT

## 2022-05-23 PROCEDURE — 97110 THERAPEUTIC EXERCISES: CPT

## 2022-05-23 NOTE — PROGRESS NOTES
PT DAILY TREATMENT NOTE     Patient Name: Severo Murders  Date:2022  : 1965  [x]  Patient  Verified  Payor: YE GARCIA / Plan: Sena Rae / Product Type: PPO /    In time:10:48  Out time:11:52  Total Treatment Time (min):  64  Visit #: 1 of      Medicare/BCBS Only   Total Timed Codes (min): 50 1:1 Treatment Time:  50      Treatment Area: Right shoulder pain [M25.511]  Acute post-operative pain [G89.18]    SUBJECTIVE  Pain Level (0-10 scale): 3  Any medication changes, allergies to medications, adverse drug reactions, diagnosis change, or new procedure performed?: [x] No    [] Yes (see summary sheet for update)  Subjective functional status/changes:   [] No changes reported  I was sitting at my table doing work with my son all day and I did a number on my neck and shoulder (UT). Pt notes R side of neck pain and also some R thumb numbness. Thumb is some tingling on the end today and pointer fingers. Pt is 4 weeks and 6 days post-operative today    OBJECTIVE         Modality rationale: decrease edema, decrease inflammation and decrease pain to improve the patients ability to decrease DOMS, improve ROM    Min Type Additional Details    10  + 4 setup   [x]? Vasopneumatic Device:  If using vaso (only need to measure limb vaso being performed on)      pre-treatment girth :  25cm       post-treatment girth : 24.6 cm       measured at (landmark location) : mid arm     Pressure: [x]? lo []? med []? hi   Temp: [x]? lo []? med []? hi    [x]? Skin assessment post-treatment:  [x]? intact []? redness- no adverse reaction       []? redness - adverse reaction:         20 min Therapeutic Exercise:  [x]?  See flow sheet :   Gisela repeated movement assessment for R nerve pain in C6 distribution         Rationale: increase ROM and increase strength to [id number] the patients ability to reach, self-care, ADLs     30 min Manual Therapy:     Wrist distraction mobs; SCJ mobilizations; R GHJ distraction (gentle grade I) for pain management. PROM wrist, elbow, shoulder to tolerance. L SL scapular mobs in all planes; STm to L UT, scalenes;     Traction retraction R SB; traction retraction R rotation   Traction retraction L Rotation increasing pt's R UE neuropathic sxs     Rationale: decrease pain, increase ROM and increase tissue extensibility to improve reaching, self care, dressing  The manual therapy interventions were performed at a separate and distinct time from the therapeutic activities interventions   (na Add 59 Modifier in conjunction with TA treatment)               x min Patient Education: [x] Review HEP    [] Progressed/Changed HEP based on:   Retraction R SB, 10 reps, 10 x/ day for pain management and centralization of sxs in the R UE     Supine positioning with the elbow supported on table for elbow supination/extension stretch; Do not allow shoulder to go into extension past  Midline to con't to protect the biceps muscle on the R      [] positioning   [] body mechanics   [] transfers   [] heat/ice application        Other Objective/Functional Measures:   R UE neurological sxs reported in the distal lateral forearm, tip of thumb and first digits. Repeated retraction in sitting (RRIS): No effect  RRIS with R rotation: no effect  Sustained retractioN: no effect  Retraction with R SB: decreased paresthesias /numbness     (-) compression, distraction and R spurlings    PROM IR: 40 deg  PROM ER: 9 deg       Pain Level (0-10 scale) post treatment: 2    ASSESSMENT/Changes in Function: pt with slowly progressing rotational PROM today. Tolerated increased PROM ER progression with PT overpressure. Pt with sxs of C6 involvement, somewhat resolving with Repeated retraction and R SB. Pt con't with significant mm guarding which limits her ease of scapular mobility and con't with protracted scapula, anterior translated clavicle and difficult with scapular elevation due to ongoing c/o UT pain and dysfunction. Patient will continue to benefit from skilled PT services to modify and progress therapeutic interventions, address functional mobility deficits, address ROM deficits, address strength deficits, analyze and address soft tissue restrictions, analyze and cue movement patterns, analyze and modify body mechanics/ergonomics, assess and modify postural abnormalities, address imbalance/dizziness and instruct in home and community integration to attain remaining goals. []  See Plan of Care  []  See progress note/recertification  []  See Discharge Summary         Progress towards goals / Updated goals: · Short Term Goals: To be accomplished in  1  weeks:  1.  Pt will be independent and compliant with HEP to progress to DC  Status at last assessment :Pt reports independence and compliance with current HEP 2-3x/day  2.  Patient will increase FOTO score to 64/100 for indications of increased functional mobility. Status at last assessment : 38/100    3.  Patient will demo PROM shoulder flexion to 90 deg per protocol for progression of joint mobility   Status at last assessment : 87 deg deg PROM after MT  Current: 88 deg (5/23/22)  4 Patient will demo PROM ER to 40 deg per protocol for progression to decrease impingement risk   Status at last assessment : Pt progressing slowly with 5 deg PROM ER  Current: progress today after more aggressive PROm; 9 degrees (5/23/22)       PHASE III goals:  1. Pt will have an increase in AROM of the R GHJ to > or = 90 deg elevation with normal scapulohumeral rhythm to restore ADLs and driving   2. Pt will have > or = 4/5 MMT of R GHJ flexion, scaption, ER and IR to begin to restore ADLs, self-care, dressing  3. Pt will tolerate a full week out of the sling with no pain and with normal standing posture to improve prognosis for return to PLOF.      PLAN  []  Upgrade activities as tolerated     [x]  Continue plan of care  []  Update interventions per flow sheet       []  Discharge due to:_  [x]  Other:_con't POC until next phase beginning at 6 weeks post-operative (1 day and 1 week)    Richmond Cabrera, PT 5/23/2022  8:20 AM    Future Appointments   Date Time Provider Jami Parrish   5/23/2022 10:45 AM Darya Tirado, PT [facility]   5/26/2022  2:00 PM Kannan Drape., PT [facility]   5/31/2022  2:00 PM Kannan Drape., PT [facility]   6/2/2022  1:15 PM [address] [place] [address] [facility]   6/7/2022  2:00 PM Kannan Drape., PT [facility]   6/9/2022  1:15 PM Kannan Drape., PT [facility]   6/14/2022  2:00 PM Kannan Drape., PT [facility]   6/16/2022  1:15 PM Kannan Drape., PT [facility]   6/21/2022  2:00 PM Kannan Drape., PT [facility]   6/23/2022  1:15 PM Kannan Drape., PT [facility]   6/28/2022  2:00 PM Kannan Drape., PT [facility]   6/30/2022  1:15 PM Kannan Drape., PT [facility]

## 2022-05-24 ENCOUNTER — TELEPHONE (OUTPATIENT)
Dept: PHYSICAL THERAPY | Age: 57
End: 2022-05-24

## 2022-05-24 NOTE — TELEPHONE ENCOUNTER
Provider returned phone call regarding patients care. Asked medical assistant to follow up with Dr. Trinity Allen to see if it is okay to push IR/ER for pt.

## 2022-05-26 ENCOUNTER — HOSPITAL ENCOUNTER (OUTPATIENT)
Dept: PHYSICAL THERAPY | Age: 57
Discharge: HOME | End: 2022-05-26
Payer: COMMERCIAL

## 2022-05-26 PROCEDURE — 97140 MANUAL THERAPY 1/> REGIONS: CPT

## 2022-05-26 PROCEDURE — 97016 VASOPNEUMATIC DEVICE THERAPY: CPT

## 2022-05-26 PROCEDURE — 97110 THERAPEUTIC EXERCISES: CPT

## 2022-05-26 NOTE — PROGRESS NOTES
PT DAILY TREATMENT NOTE     Patient Name: Angeline Ortiz  Date:2022  : 1965  [x]  Patient  Verified  Payor: BLUE CROSS / Plan: [employer] [facility] / Product Type: PPO /    In time:1:53 PM  Out time:2:58  Total Treatment Time (min): 65  Visit #: 2 of      Medicare/BCBS Only   Total Timed Codes (min): 53 1:1 Treatment Time:  48      Treatment Area: Right shoulder pain [M25.511]  Acute post-operative pain [G89.18]    SUBJECTIVE  Pain Level (0-10 scale): 4  Any medication changes, allergies to medications, adverse drug reactions, diagnosis change, or new procedure performed?: [x] No    [] Yes (see summary sheet for update)  Subjective functional status/changes:   [] No changes reported    Pt is 5 weeks 2 days post operative   F/u with MD next Wednesday. I got a little flared up on Wednesday. My neck is super sore and then in the front of the arm hurts too. I was laying down and doing some more aggressive stretching of the bicep than I normally have (in supine) and trying to get my elbow to crack. Ease of getting arm onto the bed when i'm supine. OBJECTIVE         Modality rationale: decrease edema, decrease inflammation and decrease pain to improve the patients ability to decrease DOMS, improve ROM    Min Type Additional Details    10+ 2 min setup   [x]? Vasopneumatic Device:  If using vaso (only need to measure limb vaso being performed on)      pre-treatment girth :  25cm       post-treatment girth : 24 cm       measured at (landmark location) : mid arm     Pressure: [x]? lo []? med []? hi   Temp: [x]? lo []? med []? hi    [x]? Skin assessment post-treatment:  [x]? intact []? redness- no adverse reaction       []? redness - adverse reaction:         23 (1) min Therapeutic Exercise:  [x]?  See flow sheet :  Review Retraction R SB for centralizing sxs in the R UE         Rationale: increase ROM and increase strength to [id number] the patients ability to reach, self-care, ADLs     30 min Manual Therapy:     Wrist distraction mobs; SCJ mobilizations; R GHJ distraction (gentle grade I) for pain management. GHJ and scapular girdle oscillations for relaxation. PROM wrist, elbow, shoulder to tolerance. L SL scapular mobs in all planes; STm to L UT, scalenes;     Rationale: decrease pain, increase ROM and increase tissue extensibility to improve reaching, self care, dressing  The manual therapy interventions were performed at a separate and distinct time from the therapeutic activities interventions   (na Add 59 Modifier in conjunction with TA treatment)            x min Patient Education: [x] Review HEP    [] Progressed/Changed HEP based on:   Retraction R SB, 10 reps, 10 x/ day for pain management and centralization of sxs in the R UE     Supine positioning with the elbow supported on table for elbow supination/extension stretch; Do not allow shoulder to go into extension past  Midline to con't to protect the biceps muscle on the R  -L SL position for scapular mobilizations  -positional release in R SB       [x] positioning   [] body mechanics   [] transfers   [] heat/ice application        Other Objective/Functional Measures:     PROM R GHJ IR: 65 deg  PROM R GHJ ER: 9 deg  PROM R GHJ flexion: 90 deg      Pain Level (0-10 scale) post treatment: 2-3    ASSESSMENT/Changes in Function: demo's improved indpendent and ease of scapular retraction. Communication with Ortho regarding current stiffness and ER/IR progression, MD states it was okay if she is still having some stiffness because we are only a few weeks out from her surgery. No aggressive IR/ER as of now and just to continue with her current protocol. Next visit pt will be 6 weeks post operative and ok to start next phase of rehab gradually to tolerance.      Patient will continue to benefit from skilled PT services to modify and progress therapeutic interventions, address functional mobility deficits, address ROM deficits, address strength deficits, analyze and address soft tissue restrictions, analyze and cue movement patterns, analyze and modify body mechanics/ergonomics, assess and modify postural abnormalities, address imbalance/dizziness and instruct in home and community integration to attain remaining goals. []  See Plan of Care  []  See progress note/recertification  []  See Discharge Summary         Progress towards goals / Updated goals: · Short Term Goals: To be accomplished in  1  weeks:  1.  Pt will be independent and compliant with HEP to progress to DC  Status at last assessment :Pt reports independence and compliance with current HEP 2-3x/day  2.  Patient will increase FOTO score to 64/100 for indications of increased functional mobility. Status at last assessment : 38/100    3.  Patient will demo PROM shoulder flexion to 90 deg per protocol for progression of joint mobility   Status at last assessment : 87 deg deg PROM after MT  Current: 90 deg  (5/26/22)  4 Patient will demo PROM ER to 40 deg per protocol for progression to decrease impingement risk   Status at last assessment : Pt progressing slowly with 5 deg PROM ER  Current: progress today after more aggressive PROm; 9 degrees (5/23/22)       PHASE III goals:  1. Pt will have an increase in AROM of the R GHJ to > or = 90 deg elevation with normal scapulohumeral rhythm to restore ADLs and driving   2. Pt will have > or = 4/5 MMT of R GHJ flexion, scaption, ER and IR to begin to restore ADLs, self-care, dressing  3. Pt will tolerate a full week out of the sling with no pain and with normal standing posture to improve prognosis for return to PLOF. PN due 6/19/22    PLAN  []  Upgrade activities as tolerated     [x]  Continue plan of care  []  Update interventions per flow sheet       []  Discharge due to:_  [x]  Other:_initiate next  phase at 6 weeks post-operative which is next visit on 5/31/22.  Update HEP for table slides and AROM elbow flexion/extension     Nickolas Clifford Madina Sanchez, PT 5/26/2022  8:20 AM    Future Appointments   Date Time Provider Jami Parrish   5/26/2022  2:00 PM Hernández Ice, PT [facility] [address]   5/31/2022  2:00 PM Raji Hatter., PT [facility] [address]   6/2/2022  1:15 PM [address] [place] [address] [facility] [address]   6/7/2022  2:00 PM Raji Hatter., PT [facility] [address]   6/9/2022  1:15 PM Raji Hatter., PT [facility] [address]   6/14/2022  2:00 PM Raji Hatter., PT [facility] [address]   6/16/2022  1:15 PM Raji Hatter., PT [facility] [address]   6/21/2022  2:00 PM Raji Hatter., PT [facility] [address]   6/23/2022  1:15 PM Raji Hatter., PT [facility] [address]   6/28/2022  2:00 PM Raji Hatter., PT [facility] [address]   6/30/2022  1:15 PM Raji Hatter., PT [facility] [address]

## 2022-05-31 ENCOUNTER — HOSPITAL ENCOUNTER (OUTPATIENT)
Dept: PHYSICAL THERAPY | Age: 57
Discharge: HOME | End: 2022-05-31
Payer: COMMERCIAL

## 2022-05-31 PROCEDURE — 97110 THERAPEUTIC EXERCISES: CPT

## 2022-05-31 PROCEDURE — 97140 MANUAL THERAPY 1/> REGIONS: CPT

## 2022-05-31 NOTE — PROGRESS NOTES
PT DAILY TREATMENT NOTE     Patient Name: Xiao Crawford  Date:2022  : 1965  [x]  Patient  Verified  Payor: YE GARCIA / Plan: Johan Lee / Product Type: PPO /    In time: 10:46 pm             Out time: 11:50 pm  Total Treatment Time (min): 64  Visit #: 3 of      Medicare/BCBS Only   Total Timed Codes (min): 54 1:1 Treatment Time:  54      Treatment Area: Right shoulder pain [M25.511]  Acute post-operative pain [G89.18]    SUBJECTIVE  Pain Level (0-10 scale): 4 constant ache  Any medication changes, allergies to medications, adverse drug reactions, diagnosis change, or new procedure performed?: [x] No    [] Yes (see summary sheet for update)  Subjective functional status/changes:   [] No changes reported  Patient reiterates constant ache in the shoulder, although states she is following up with her surgeon tomorrow and will inquire as to when she can wean away from her sling. She is unsure if the sling is causing the aching due to it's positioning. OBJECTIVE         Modality rationale: decrease edema, decrease inflammation and decrease pain to improve the patients ability to decrease DOMS, improve ROM    Min Type Additional Details    10 [x]? Vasopneumatic Device:  If using vaso (only need to measure limb vaso being performed on)      pre-treatment girth:  25 cm       post-treatment girth: 24 cm       measured at (landmark location): mid-arm     Pressure: [x]? lo []? med []? hi   Temp: [x]? lo []? med []? hi    [x]? Skin assessment post-treatment:  [x]? intact []? redness- no adverse reaction       []? redness - adverse reaction:         46 min Therapeutic Exercise:  [x]?  See flow sheet: progressed to bicep curls concentric with assistance, independent eccentric lowering      Rationale: increase ROM and increase strength to [id number] the patients ability to reach, self-care, ADLs     8 min Manual Therapy: (R) shoulder grade III GHJ mobs inferior and posterior f/b PROM flexion, Progress Note  3/10/2018 6:42 PM  Subjective:   Admit Date: 2/21/2018  PCP: No primary care provider on file. Interval History:     Diet: DIET RENAL; Carb Control: 4 carbs/meal (approximate 1800 kcals/day)    Data:   Scheduled Meds:   lisinopril  5 mg Oral Daily    amLODIPine  5 mg Oral Nightly    b complex-C-folic acid  1 capsule Oral Daily    metoprolol succinate  50 mg Oral Nightly    bumetanide  2 mg Oral BID    darbepoetin jesús-polysorbate  60 mcg Subcutaneous Weekly    albuterol-ipratropium  1 puff Inhalation BID    sodium chloride flush  10 mL Intravenous 2 times per day    heparin (porcine)  5,000 Units Subcutaneous 3 times per day    insulin lispro  0-6 Units Subcutaneous TID WC    insulin lispro  0-3 Units Subcutaneous Nightly     Continuous Infusions:   dextrose       PRN Meds:heparin (porcine), sodium chloride flush, acetaminophen, magnesium hydroxide, ondansetron, glucose, dextrose, glucagon (rDNA), dextrose  I/O last 3 completed shifts: In: 900 [P.O.:600]  Out: 3200   No intake/output data recorded. Intake/Output Summary (Last 24 hours) at 03/10/18 1842  Last data filed at 03/10/18 1341   Gross per 24 hour   Intake              900 ml   Output             3200 ml   Net            -2300 ml     CBC:   Recent Labs      03/08/18   0956  03/10/18   0922   WBC  6.2  5.7   HGB  9.5*  9.8*   PLT  243  249     BMP:  Recent Labs      03/08/18   0956   NA  135   K  5.2*   CL  101   CO2  26   BUN  27*   CREATININE  4.0*   GLUCOSE  131*     Hepatic: Recent Labs      03/08/18   0956   AST  18   ALT  19   BILITOT  0.4   ALKPHOS  102     Troponin: No results for input(s): TROPONINI in the last 72 hours. BNP: No results for input(s): BNP in the last 72 hours. Lipids: No results for input(s): CHOL, HDL in the last 72 hours.     Invalid input(s): LDLCALCU  ABGs: No results found for: PHART, PO2ART, DHD2JOY  INR: No results for input(s): INR in the last 72 abduction, and ER as per patient tolerance   Rationale: decrease pain, increase ROM and increase tissue extensibility to improve reaching, self care, dressing. The manual therapy interventions were performed at a separate and distinct time from the therapeutic activities interventions. (na Add 61 Modifier in conjunction with TA treatment)            X min Patient Education: [x] Review HEP     Other Objective/Functional Measures:   Shoulder flexion AAROM, supine: 80 deg  Patient currently 6 weeks post-operatively and able to progress to next phase of protocol. Pain Level (0-10 scale) post treatment: 0    ASSESSMENT/Changes in Function:   Patient with excellent response to grade III GHJ mobs inferiorly and posteriorly to improve passive ER to 15 degrees. Able to initiate bicep curls with patient able to demo full elbow flexion, although with limited extension. Patient will continue to benefit from skilled PT services to modify and progress therapeutic interventions, address functional mobility deficits, address ROM deficits, address strength deficits, analyze and address soft tissue restrictions, analyze and cue movement patterns, analyze and modify body mechanics/ergonomics, assess and modify postural abnormalities, address imbalance/dizziness and instruct in home and community integration to attain remaining goals. []  See Plan of Care  []  See progress note/recertification  []  See Discharge Summary         Progress towards goals / Updated goals: · Short Term Goals: To be accomplished in  1  weeks:  1.  Pt will be independent and compliant with HEP to progress to DC  Status at last assessment :Pt reports independence and compliance with current HEP 2-3x/day  2.  Patient will increase FOTO score to 64/100 for indications of increased functional mobility.    Status at last assessment : 38/100    3.  Patient will demo PROM shoulder flexion to 90 deg per protocol for progression of joint mobility   Status at last assessment : 87 deg deg PROM after MT  Current: 90 deg  (5/26/22)  4 Patient will demo PROM ER to 40 deg per protocol for progression to decrease impingement risk   Status at last assessment : Pt progressing slowly with 5 deg PROM ER  Current: progress today after more aggressive PROm; 9 degrees (5/23/22)       PHASE III goals:  1. Pt will have an increase in AROM of the R GHJ to > or = 90 deg elevation with normal scapulohumeral rhythm to restore ADLs and driving   2. Pt will have > or = 4/5 MMT of R GHJ flexion, scaption, ER and IR to begin to restore ADLs, self-care, dressing  3. Pt will tolerate a full week out of the sling with no pain and with normal standing posture to improve prognosis for return to PLOF. PN due 6/19/22    PLAN  []  Upgrade activities as tolerated     [x]  Continue plan of care  []  Update interventions per flow sheet       []  Discharge due to:_  [x]  Other:_initiate next  phase at 6 weeks post-operative which is next visit on 5/31/22.  Update HEP for table slides and AROM elbow flexion/extension     [facility], [facility] 5/31/2022  8:20 AM    Future Appointments   Date Time Provider Jami Parrish   6/2/2022  1:15 PM [facility]   6/21/2022  2:00 PM Brooklynn Reeves., PT [facility]   6/23/2022  1:15 PM Brooklynn Reeves., PT [facility]   6/28/2022  2:00 PM Brooklynn Reeevs., PT [facility]   6/30/2022  1:15 PM Brooklynn Reeves., PT [facility]

## 2022-06-02 ENCOUNTER — HOSPITAL ENCOUNTER (OUTPATIENT)
Dept: PHYSICAL THERAPY | Age: 57
Discharge: HOME | End: 2022-06-02
Payer: COMMERCIAL

## 2022-06-02 PROCEDURE — 97016 VASOPNEUMATIC DEVICE THERAPY: CPT

## 2022-06-02 PROCEDURE — 97140 MANUAL THERAPY 1/> REGIONS: CPT

## 2022-06-02 PROCEDURE — 97110 THERAPEUTIC EXERCISES: CPT

## 2022-06-02 NOTE — PROGRESS NOTES
PT DAILY TREATMENT NOTE     Patient Name: Josh Davis  [id number]  : 1965  [x]  Patient  Verified  Payor: BLUE CROSS / Plan: [facility] / Product Type: PPO /    In time:1319 pm Out time:1416 pm  Total Treatment Time (min): 62  Visit #: 4 of     Medicare/BCBS Only   Total Timed Codes (min):  42 1:1 Treatment Time:  42       Treatment Area: Right shoulder pain [M25.511]  Acute post-operative pain [G89.18]    SUBJECTIVE  Pain Level (0-10 scale): 2/10  Any medication changes, allergies to medications, adverse drug reactions, diagnosis change, or new procedure performed?: [x] No    [] Yes (see summary sheet for update)  Subjective functional status/changes:   [] No changes reported  \"I saw the doctor yesterday, I am on [place], he took the sling off. \" Patient reports she slept yesterday without the sling. Pt reports some soreness after last PT session with no increased pain.      OBJECTIVE    Modality rationale: decrease pain to improve the patients ability to perform UE functional activities with decreased discomfort    Min Type Additional Details    [] Estim:  []Unatt       []IFC  []Premod                        []Other:  []w/ice   []w/heat  Position:   Location:     [] Estim: []Att    []TENS instruct  []NMES                    []Other:  []w/US   []w/ice   []w/heat  Position:  Location:    []  Traction: [] Cervical       []Lumbar                       [] Prone          []Supine                       []Intermittent   []Continuous Lbs:  [] before manual  [] after manual    []  Ultrasound: []Continuous   [] Pulsed                           []1MHz   []3MHz W/cm2:  Location:    []  Iontophoresis with dexamethasone         Location: [] Take home patch   [] In clinic    []  Ice     []  heat  []  Ice massage  []  Laser   []  Anodyne Position:   Location:     []  Laser with stim  []  Other:  Position:  Location:   10' + 5' set up/removal [x]  Vasopneumatic Device    [x]  Right   shoulder  [] Left  Pre-treatment girth: 45 cm  Post-treatment girth: 45 cm  Measured at (location): at axilla Pressure:       [x] lo [] med [] hi   Temperature: [x] lo [] med [] hi   [x] Skin assessment post-treatment:  [x]intact []redness- no adverse reaction    []redness - adverse reaction:       34 min Therapeutic Exercise:  [x] See flow sheet :   Rationale: increase ROM and increase strength to improve the patients ability to perform ADLs with improved shoulder/elbow mobility. 8 min Manual Therapy:  Right shoulder grade II/III GHJ mobs inferior and posterior; PROM flexion to patient's tolerance   The manual therapy interventions were performed at a separate and distinct time from the therapeutic activities interventions. Rationale: decrease pain, increase ROM, increase tissue extensibility, decrease trigger points and increase postural awareness to improve ease of ADLs in the home environment. With   [x] TE   [] TA   [] neuro   [] other: Patient Education: [x] Review HEP    [] Progressed/Changed HEP based on:   [] positioning   [] body mechanics   [] transfers   [] heat/ice application    [x] other: updated HEP for AAROM right shoulder flexion, scaption, ER, AROM elbow flexion and extension - as per MD protocol      Other Objective/Functional Measures:   AAROM Right shoulder flexion supine with wand: 94 degrees     Pain Level (0-10 scale) post treatment: 0    ASSESSMENT/Changes in Function: Patient with positive response to today's treatment session as patient reports decreased pain levels post PT session. Focus of today's treatment on AAROM exercises for Right shoulder as per MD protocol (phase III on 5/31/22). The patient requires skilled cuing to perform exercises with proper form and to patient's tolerance. Pt was instructed in additional exercises for HEP to patient's tolerance.  Pt was given hand out detailing exercises and instructed in modification of exercises to tolerance, and in performing exercises safely. Pt verbalized understanding. Patient will continue to benefit from skilled PT services to modify and progress therapeutic interventions, address functional mobility deficits, address ROM deficits, address strength deficits, analyze and address soft tissue restrictions, analyze and cue movement patterns, analyze and modify body mechanics/ergonomics, assess and modify postural abnormalities, address imbalance/dizziness and instruct in home and community integration to attain remaining goals. []  See Plan of Care  []  See progress note/recertification  []  See Discharge Summary         Progress towards goals / Updated goals: · Short Term Goals: To be accomplished in  1  weeks:  1.  Pt will be independent and compliant with HEP to progress to DC  Status at last assessment :Pt reports independence and compliance with current HEP 2-3x/day  Current:  2.  Patient will increase FOTO score to 64/100 for indications of increased functional mobility. Status at last assessment : 38/100     Current:  3.  Patient will demo PROM shoulder flexion to 90 deg per protocol for progression of joint mobility   Status at last assessment : 87 deg deg PROM after MT  Current: 90 deg  (5/26/22)  4 Patient will demo PROM ER to 40 deg per protocol for progression to decrease impingement risk   Status at last assessment : Pt progressing slowly with 5 deg PROM ER  Current: progress today after more aggressive PROm; 9 degrees (5/23/22)       PHASE III goals:  1. Pt will have an increase in AROM of the R GHJ to > or = 90 deg elevation with normal scapulohumeral rhythm to restore ADLs and driving  Current: AAROM Right shoulder flexion supine with wand: 94 degrees (6/2/22) progressing   2. Pt will have > or = 4/5 MMT of R GHJ flexion, scaption, ER and IR to begin to restore ADLs, self-care, dressing  3.  Pt will tolerate a full week out of the sling with no pain and with normal standing posture to improve prognosis for return to PLOF.      PN due 6/19/22    PLAN  [x]  Upgrade activities as tolerated     [x]  Continue plan of care  []  Update interventions per flow sheet       []  Discharge due to:_  []  Other:_      Rere Lambert, PT 6/2/2022  2:39 PM     Future Appointments   Date Time Provider Jami Parrish   6/21/2022  2:00 PM [facility]   6/23/2022  [facility]   6/28/2022  2:00 PM [facility] PT [facility]   6/30/2022  1:15 PM [facility] PT [facility]

## 2022-06-07 ENCOUNTER — APPOINTMENT (OUTPATIENT)
Dept: PHYSICAL THERAPY | Age: 57
End: 2022-06-07
Payer: COMMERCIAL

## 2022-06-09 ENCOUNTER — APPOINTMENT (OUTPATIENT)
Dept: PHYSICAL THERAPY | Age: 57
End: 2022-06-09
Payer: COMMERCIAL

## 2022-06-14 ENCOUNTER — APPOINTMENT (OUTPATIENT)
Dept: PHYSICAL THERAPY | Age: 57
End: 2022-06-14
Payer: COMMERCIAL

## 2022-06-16 ENCOUNTER — APPOINTMENT (OUTPATIENT)
Dept: PHYSICAL THERAPY | Age: 57
End: 2022-06-16
Payer: COMMERCIAL

## 2022-06-21 ENCOUNTER — HOSPITAL ENCOUNTER (OUTPATIENT)
Dept: PHYSICAL THERAPY | Age: 57
Discharge: HOME | End: 2022-06-21
Payer: COMMERCIAL

## 2022-06-21 PROCEDURE — 97140 MANUAL THERAPY 1/> REGIONS: CPT

## 2022-06-21 PROCEDURE — 97110 THERAPEUTIC EXERCISES: CPT

## 2022-06-21 NOTE — PROGRESS NOTES
PT DAILY TREATMENT NOTE     Patient Name: Dawna Torres  Date:2022  : 1965  [x]  Patient  Verified  Payor: BLUE CROSS / Plan: [address] [place] [address] [place] / Product Type: PPO /    In time: 208 Out time: 305  Total Treatment Time (min): 57  Visit #: 5 of     Medicare/BCBS Only   Total Timed Codes (min):  57 1:1 Treatment Time: 62       Treatment Area: Right shoulder pain [M25.511]  Acute post-operative pain [G89.18]    SUBJECTIVE  Pain Level (0-10 scale): 1/10  Any medication changes, allergies to medications, adverse drug reactions, diagnosis change, or new procedure performed?: [x] No    [] Yes (see summary sheet for update)  Subjective functional status/changes:   [] No changes reported  Pt returns from two weeks vacation in [place], Pt reports with no DC info from PT in [place]. States that she is unhappy with her progress.      OBJECTIVE    Modality rationale: decrease pain to improve the patients ability to perform UE functional activities with decreased discomfort    Min Type Additional Details    [] Estim:  []Unatt       []IFC  []Premod                        []Other:  []w/ice   []w/heat  Position:   Location:     [] Estim: []Att    []TENS instruct  []NMES                    []Other:  []w/US   []w/ice   []w/heat  Position:  Location:    []  Traction: [] Cervical       []Lumbar                       [] Prone          []Supine                       []Intermittent   []Continuous Lbs:  [] before manual  [] after manual    []  Ultrasound: []Continuous   [] Pulsed                           []1MHz   []3MHz W/cm2:  Location:    []  Iontophoresis with dexamethasone         Location: [] Take home patch   [] In clinic    []  Ice     []  heat  []  Ice massage  []  Laser   []  Anodyne Position:   Location:     []  Laser with stim  []  Other:  Position:  Location:   NV [x]  Vasopneumatic Device    [x]  Right   shoulder  []  Left  Pre-treatment girth:   Post-treatment girth:   Measured at (location):  Pressure: [x] lo [] med [] hi   Temperature: [x] lo [] med [] hi   [x] Skin assessment post-treatment:  [x]intact []redness- no adverse reaction    []redness - adverse reaction:       47 min Therapeutic Exercise:  [x] See flow sheet :  FOTO reassessment / functional ability assessment    Rationale: increase ROM and increase strength to improve the patients ability to perform ADLs with improved shoulder/elbow mobility. 10 min Manual Therapy:  Right shoulder oscillations for relaxation and pain relief; PROM flexion/abduction/ER to patient's tolerance   The manual therapy interventions were performed at a separate and distinct time from the therapeutic activities interventions. Rationale: decrease pain, increase ROM, increase tissue extensibility, decrease trigger points and increase postural awareness to improve ease of ADLs in the home environment.           With   [x] TE   [] TA   [] neuro   [] other: Patient Education: [x] Review HEP:     [] Progressed/Changed HEP based on:   [x] positioning   [] body mechanics   [] transfers   [] heat/ice application    [] other: progression of progress and severity of surgery  Progression in protocol     Other Objective/Functional Measures:   Improvements: some light self-care, sleeping longer through the night, UE propped up during night, sling removed, eating (cutting food), able to work on computer,   Subjective % improvement: 30%   Deficits: Reaching overhead with arm, reaching to L side of body  Pt goal: increase ROM, increase strength  \"get some of the achy-ness gone, but I don't think that is realistic; ROM - feeling more confident that i'm on par with where I should be with the outer rotation\"  Posture:out of sling pt demo's protraction, anterior tipping and winging of the scapula   ROM: L WFL                                       AAROM R GHJ    Right   Flexion  97 deg   ABD (no rotation) 61 deg    ER @ 20 Deg ABD 15 deg       Elbow PROM:  0 to 145 deg  Sup//pro: 90 deg each Strength: R UE: Flex, abd, scaption 3/5 to available ROM. 3+/5 ER/IR to available ROM                                                      Neuro:  pt notes min patch of hypersensitivity over the distal distribution of the lateral antibrachial cutaneous n / C6 /musculocutaneous    Pain Level (0-10 scale) post treatment: 1/10    ASSESSMENT/Changes in Function: Pt presents today 9 weeks post operative. Pt has been out of town for the last two weeks and received PT care in [facility], Pt presents with no DC paperwork and states that none was provided. Pt is making progress towards improved sleep, managed pain levels, and increased ability to reach across midline to other side of face. Pt continues to make slow progress towards other goals addressing AROM and PROM, strength, normalized movement of shoulder, and updated HEP. Pt will benefit from further skilled care to meet updated and unmet goals. See PN    Patient will continue to benefit from skilled PT services to modify and progress therapeutic interventions, address functional mobility deficits, address ROM deficits, address strength deficits, analyze and address soft tissue restrictions, analyze and cue movement patterns, analyze and modify body mechanics/ergonomics, assess and modify postural abnormalities, address imbalance/dizziness and instruct in home and community integration to attain remaining goals. []  See Plan of Care  []  See progress note/recertification  []  See Discharge Summary         Progress towards goals / Updated goals: · Short Term Goals: To be accomplished in  1  weeks:  1.  Pt will be independent and compliant with HEP to progress to DC  Status at last assessment :Pt reports independence and compliance with current HEP 2-3x/day  Current:2x/day, Pt confirms compliance of current HEP (6/21/22)  2.  Patient will increase FOTO score to 64/100 for indications of increased functional mobility.    Status at last assessment : 38/100     Current: FOTO score 36 pts (6/21/22)  3.  Patient will demo PROM shoulder flexion to 90 deg per protocol for progression of joint mobility   Status at last assessment : 87 deg deg PROM after MT  Previous assessment: 90 deg  (5/26/22)  Current: PROM during table slide R shoulder flexion 97 deg (6/21/22)     4 Patient will demo PROM ER to 40 deg per protocol for progression to decrease impingement risk   Status at last assessment : Pt progressing slowly with 5 deg PROM ER  Previous assessment: progress today after more aggressive PROM; 9 degrees (5/23/22)   Current:  Pt progressing slowly with 15 deg PROM ER (6/21/22)     PHASE III goals:  1. Pt will have an increase in AROM of the R GHJ to > or = 90 deg elevation with normal scapulohumeral rhythm to restore ADLs and driving  Current: AAROM Right shoulder flexion supine with wand: 94 degrees (6/2/22) progressing  2. Pt will have > or = 4/5 MMT of R GHJ flexion, scaption, ER and IR to begin to restore ADLs, self-care, dressing  Current: 3+/5 in all planes with in available ROM  3.  Pt will tolerate a full week out of the sling with no pain and with normal standing posture to improve prognosis for return to PLOF.   Current: Pt tolerates 3 full weeks out of sling with no pain     PN due 7/21/22    PLAN  [x]  Upgrade activities as tolerated     [x]  Continue plan of care  []  Update interventions per flow sheet       []  Discharge due to:_  []  Other:_  Issue updated HEP with isometrics and c/spine stretches    Anisa Washington PTA 6/21/2022  2:39 PM     Future Appointments   Date Time Provider Jami Parrish   6/21/2022  2:00 PM [address] [facility] [address]   6/23/2022  1:15 PM Joseph Diaz, PT [facility] [address]   6/28/2022  2:00 PM Joseph Diaz, PT [facility] [address]   6/30/2022  1:15 PM Alison Mcnally, PT [facility] [address]

## 2022-06-21 NOTE — PROGRESS NOTES
[address] [facility] [address] [facility] AT [address]. [place] [address] 56  Phone: [phone] Fax: [phone]  PROGRESS NOTE  Patient Name: Xiao Crawford : 1965   Treatment/Medical Diagnosis: Right shoulder pain [M25.511]  Acute post-operative pain [G89.18]   Referral Source: Preeti Santana*     Date of Initial Visit: 22; DOS 2022 Attended Visits: 14 Missed Visits: 0     SUMMARY OF TREATMENT  Pt is a 61y.o. year old female who presents with  s/p R shoulder large RTC repair and biceps tenodesis DOS 2022. Ther ex including distal wrist strengthening and ROM; elbow and shoulder PROM: scapular activation;  strength; manual therapy including:scapular mobs; PROM R shoulder in protocol, PROM elbow and wrist as needed; STM to mid to proximal UT and LS (no MFR to surgically involved mm) for pain relief;  Patient education; HEP, cryotherapy for edema and pain management    CURRENT STATUS  Pt presents today 9 weeks post operative. Pt has been out of town for the last two weeks and received PT care in [place]. Pt presents with no DC paperwork and states that none was provided. Pt is making progress towards improved sleep, managed pain levels, and increased ability to reach across midline to other side of face. Pt continues to make slow progress towards other goals addressing AROM and PROM, strength, normalized movement of shoulder, and updated HEP. Pt will benefit from further skilled care to meet updated and unmet goals.     Pain ranges: 0 to 4/10 at home with ADLs   Described as : dull achy   Improvements: some light self-care, sleeping longer through the night, UE propped up during night, sling removed, eating (cutting food), able to work on computer,   Subjective % improvement: 30%   Deficits: Reaching overhead with arm, reaching to L side of body  Pt goal: increase ROM, increase strength  \"get some of the achy-ness gone, but I don't think that is realistic; ROM - feeling more confident that i'm on par with where I should be with the outer rotation\"  Posture:out of sling, pt demo's anterior tipping and winging of the scapula   ROM: L WFL                                       AAROM R GHJ    Right   Flexion  97   ABD (no rotation) 61 deg    ER @ 20 Deg ABD 15 deg       Elbow PROM:  0 to 145 deg  Sup//pro: 90 deg each   Strength: R UE: Flex, abd, scaption 3/5 to available ROM. 3+/5 ER/IR to available ROM                                                      Neuro:  pt notes min patch of hypersensitivity over the distal distribution of the lateral antibrachial cutaneous n / C6 /musculocutaneous      Progress towards goals: · Short Term Goals: To be accomplished in  1  weeks:  1.  Pt will be independent and compliant with HEP to progress to DC  Status at last assessment :Pt reports independence and compliance with current HEP 2-3x/day  Current:2x/day, Pt confirms compliance of current HEP (6/21/22)  2.  Patient will increase FOTO score to 64/100 for indications of increased functional mobility.    Status at last assessment : 38/100     Current: no met, FOTO score 36 pts (6/21/22)  3.  Patient will demo PROM shoulder flexion to 90 deg per protocol for progression of joint mobility   Status at last assessment : 87 deg deg PROM after MT  Status at last assessment: 90 deg  (5/26/22)  Current: goal met, PROM during table slide R shoulder flexion 97 deg (6/21/22  4 Patient will demo PROM ER to 40 deg per protocol for progression to decrease impingement risk   Status at last assessment : progress today after more aggressive PROM; 9 degrees (5/23/22)   Current: goal progressing, Pt progressing slowly with 15 deg PROM ER (6/21/22)     PHASE III goals:  1. Pt will have an increase in AROM of the R GHJ to > or = 90 deg elevation with normal scapulohumeral rhythm to restore ADLs and driving  Per last assessment: AAROM Right shoulder flexion supine with wand: 94 degrees (6/2/22) progressing   2. Pt will have > or = 4/5 MMT of R GHJ flexion, scaption, ER and IR to begin to restore ADLs, self-care, dressing  Current: 3+/5 in all planes with in available ROM  3. Pt will tolerate a full week out of the sling with no pain and with normal standing posture to improve prognosis for return to PLOF.   Current: goal met, Pt tolerates 3 full weeks out of sling with no pain     New goals to complete in 6 weeks:  1. Pt will be independent and compliant with updated HEP to progress to DC. 2.  Pt will have an increase in AROM of the R GHJ to > or = 150 deg elevation with normal scapulohumeral rhythm to restore ADLs and driving. 3. Pt will have > or = 4/5 MMT of R GHJ flexion, scaption, ER and IR to begin to restore ADLs, self-care, dressing. 4. Patient will demo PROM ER to 40 deg per protocol for progression to decrease impingement risk.   5. Patient will increase FOTO score to 64/100 for indications of increased functional mobility. RECOMMENDATIONS  Continue with previous unmet goals, in addition to increased ROM goals listed above. Recommend con't PT 3x/week for 6 week per MD protocol. If you have any questions/comments please contact us directly at [phone]. Thank you for allowing us to assist in the care of your patient. Therapist Signature: Therapist Signature: TRI Ca DPT Date: 6/21/2022   Reporting period: 5/19/22 to 6/21/22 Time: 8:52am    NOTE TO PHYSICIAN:  PLEASE COMPLETE THE ORDERS BELOW AND FAX TO   [facility]: [phone].   If you are unable to process this request in 24 hours please contact our office: [phone].  ___ I have read the above report and request that my patient continue as recommended.   ___ I have read the above report and request that my patient continue therapy with the following changes/special instructions:_________________________________________________________   ___ I have read the above report and request that my patient be discharged from therapy.      Physician Signature:        Date:       Time:

## 2022-06-23 ENCOUNTER — HOSPITAL ENCOUNTER (OUTPATIENT)
Dept: PHYSICAL THERAPY | Age: 57
Discharge: HOME | End: 2022-06-23
Payer: COMMERCIAL

## 2022-06-23 PROCEDURE — 97110 THERAPEUTIC EXERCISES: CPT

## 2022-06-23 PROCEDURE — 97140 MANUAL THERAPY 1/> REGIONS: CPT

## 2022-06-23 NOTE — PROGRESS NOTES
PT DAILY TREATMENT NOTE     Patient Name: Aleen Essex  Date:2022  : 1965  [x]  Patient  Verified  Payor: Tricia Avilakeara / [facility] / Product Type: PPO /    In time: 338 Out time: 500  Total Treatment Time (min): 82  Visit #: 1 of     Medicare/BCBS Only   Total Timed Codes (min):  72 1:1 Treatment Time: 65       Treatment Area: Right shoulder pain [M25.511]  Acute post-operative pain [G89.18]    SUBJECTIVE  Pain Level (0-10 scale): 1/10  Any medication changes, allergies to medications, adverse drug reactions, diagnosis change, or new procedure performed?: [x] No    [] Yes (see summary sheet for update)  Subjective functional status/changes:   [] No changes reported  Pt reports \"I am concerned over my lack of mobility\"    OBJECTIVE    Modality rationale: decrease pain to improve the patients ability to perform UE functional activities with decreased discomfort    Min Type Additional Details    [] Estim:  []Unatt       []IFC  []Premod                        []Other:  []w/ice   []w/heat  Position:   Location:     [] Estim: []Att    []TENS instruct  []NMES                    []Other:  []w/US   []w/ice   []w/heat  Position:  Location:    []  Traction: [] Cervical       []Lumbar                       [] Prone          []Supine                       []Intermittent   []Continuous Lbs:  [] before manual  [] after manual    []  Ultrasound: []Continuous   [] Pulsed                           []1MHz   []3MHz W/cm2:  Location:    []  Iontophoresis with dexamethasone         Location: [] Take home patch   [] In clinic    []  Ice     []  heat  []  Ice massage  []  Laser   []  Anodyne Position:   Location:     []  Laser with stim  []  Other:  Position:  Location:   10 (nc) [x]  Vasopneumatic Device    [x]  Right   shoulder  []  Left  Pre-treatment girth: NOT TAKEN   Post-treatment girth:  NOT TAKEN   Measured at (location):  Pressure:       [x] lo [] med [] hi   Temperature: [x] lo [] med [] hi [x] Skin assessment post-treatment:  [x]intact []redness- no adverse reaction    []redness - adverse reaction:       47 min Therapeutic Exercise:  [x] See flow sheet : EXTENSIVE PATIENT EDUCATION   Rationale: increase ROM and increase strength to improve the patients ability to perform ADLs with improved shoulder/elbow mobility. 15 min Manual Therapy:  R shoulder inferior glide grade 2-3, posterior glide grade 2-3, PROM flexion with scap stabilization , PROM ER to tolerance   The manual therapy interventions were performed at a separate and distinct time from the therapeutic activities interventions. Rationale: decrease pain, increase ROM, increase tissue extensibility, decrease trigger points and increase postural awareness to improve ease of ADLs in the home environment. With   [x] TE   Patient Education: [x] See assessment below  Updated HEP pics per hard chart      Other Objective/Functional Measures:   Improvement: improved mobility since getting the sling removed  Deficits: fearful of lack of AROM/ functional reach    IR - 1 inch from belly   ER - 5 deg     Pain Level (0-10 scale) post treatment: 1/10    ASSESSMENT/Changes in Function: Pt presents today 9 weeks, 3 days post operative. Extensive education on  POC/ treating therapists and seeing same clinician to avoid confusion and \"someone that knows me. I have no complaints\" Patient is very concerned with lack of active and passive ROM and educated on frozen shoulder, recovery and visual demo of RCR/ SAD/ bicep tenodesis using model. Nisha is demonstrating s/s consistent with frozen shoulder at this time sec to AROM = PROM. Updated in clinic program and HEP to progression given protocol. Patient education re massage for UT compensatory m pain.      Patient will continue to benefit from skilled PT services to modify and progress therapeutic interventions, address functional mobility deficits, address ROM deficits, address strength deficits, analyze and address soft tissue restrictions, analyze and cue movement patterns, analyze and modify body mechanics/ergonomics, assess and modify postural abnormalities, address imbalance/dizziness and instruct in home and community integration to attain remaining goals. []  See Plan of Care  [x]  See progress note/recertification 6/27/8417   []  See Discharge Summary         Progress towards goals / Updated goals:   New goals to complete in 6 weeks:  1. Pt will be independent and compliant with updated HEP to progress to DC. - updated HEP today 6/23/2022  2. Pt will have an increase in AROM of the R GHJ to > or = 150 deg elevation with normal scapulohumeral rhythm to restore ADLs and driving. 3. Pt will have > or = 4/5 MMT of R GHJ flexion, scaption, ER and IR to begin to restore ADLs, self-care, dressing. 4. Patient will demo PROM ER to 40 deg per protocol for progression to decrease impingement risk.- PROM ER to 5 deg 6/23/2022   5. Patient will increase FOTO score to 64/100 for indications of increased functional mobility.    PN due 7/21/22    PLAN  [x]  Upgrade activities as tolerated     [x]  Continue plan of care  []  Update interventions per flow sheet       []  Discharge due to:_  [x]  Other:_ progress joint mobility    Add should iso CAROLA Najera PT 6/23/2022  2:39 PM     Future Appointments   Date Time Provider Jami Parrish   6/28/2022  2:00 PM Lesli White [facility]   6/30/2022  1:15 PM Wes Mccoy, PT [facility]   7/6/2022  2:00 PM Lesli White [facility]   7/7/2022 11:30 AM Damian Silva PT [facility]   7/11/2022 11:30 AM Lesli White [facility]   7/13/2022  2:45 PM Lesli White [facility]   7/19/2022 11:30 AM Damian Silva PT [facility]   7/21/2022 11:30 AM Damian Silva, PT [facility]   7/26/2022 11:30 AM Damian Silva, PT [facility]   7/28/2022 11:30 AM Damian Silva, PT [facility]   8/2/2022 11:30 AM Damian Silva, PT [facility]   8/4/2022 11:30 AM Leotha Even, PT [facility]   8/9/2022 11:30 AM Leotha Even, PT [facility]   8/11/2022 11:30 AM Leotha Even, PT [facility]   8/16/2022 11:30 AM Leotha Even, PT [facility]   8/18/2022 11:30 AM Leotha Even, PT [facility]   8/23/2022 11:30 AM Leotha Even, PT [facility]   8/25/2022 11:30 AM Leotha Even, PT [facility]   8/30/2022 11:30 AM Leotha Even, PT [facility]   9/1/2022 11:30 AM Leotha Even, PT [facility]

## 2022-06-28 ENCOUNTER — HOSPITAL ENCOUNTER (OUTPATIENT)
Dept: PHYSICAL THERAPY | Age: 57
Discharge: HOME | End: 2022-06-28
Payer: COMMERCIAL

## 2022-06-28 PROCEDURE — 97140 MANUAL THERAPY 1/> REGIONS: CPT

## 2022-06-28 PROCEDURE — 97016 VASOPNEUMATIC DEVICE THERAPY: CPT

## 2022-06-28 PROCEDURE — 97110 THERAPEUTIC EXERCISES: CPT

## 2022-06-28 NOTE — PROGRESS NOTES
PT DAILY TREATMENT NOTE     Patient Name: Shawna Tobias  Date:2022  : 1965  [x]  Patient  Verified  Payor: BLUE [employer]OSS / Plan: [address] [place] [address] [place] / Product Type: PPO /    In time:2:07  Out time:3:26  Total Treatment Time (min): 79  Visit #: 2 of     Medicare/BCBS Only   Total Timed Codes (min):  64 1:1 Treatment Time:  44       Treatment Area: Right shoulder pain [M25.511]  Acute post-operative pain [G89.18]    SUBJECTIVE  Pain Level (0-10 scale): 2  Any medication changes, allergies to medications, adverse drug reactions, diagnosis change, or new procedure performed?: [x] No    [] Yes (see summary sheet for update)  Subjective functional status/changes:   [] No changes reported  \"My shoulder is just very, very stiff. Do you think I have frozen shoulder? \"    OBJECTIVE    Modality rationale: decrease inflammation and decrease pain to improve the patients ability to relax and sleep following therapy session to improve restorative sleep patterns.     Min Type Additional Details    [x] Estim:  []Unatt       []IFC  []Premod                        []Other:  []w/ice   []w/heat  Position:   Location:     [] Estim: []Att    []TENS instruct  []NMES                    []Other:  []w/US   []w/ice   []w/heat  Position:  Location:    []  Traction: [] Cervical       []Lumbar                       [] Prone          []Supine                       []Intermittent   []Continuous Lbs:  [] before manual  [] after manual    []  Ultrasound: []Continuous   [] Pulsed                           []1MHz   []3MHz W/cm2:  Location:    []  Iontophoresis with dexamethasone         Location: [] Take home patch   [] In clinic    []  Ice     []  heat  []  Ice massage  []  Laser   []  Anodyne Position:   Location:     []  Laser with stim  []  Other:  Position:  Location:   10 [x]  Vasopneumatic Device    [x]  Right     []  Left  Pre-treatment girth: 32.4 cm  Post-treatment girth: 32.3 cm  Measured at (location): axillary fold Pressure:       [x] lo [] med [] hi   Temperature: [x] lo [] med [] hi   [x] Skin assessment post-treatment:  [x]intact []redness- no adverse reaction    []redness - adverse reaction:       49 min Therapeutic Exercise:  [x] See flow sheet :   Rationale: increase ROM and increase strength to improve the patients ability to perform ADLs with improved shoulder/elbow strength and mobility. 15 min Manual Therapy:  R shoulder inferior glide grade 2-3, posterior glide grade 2-3, manual stretching into flexion/scaption/ER with scap stabilization and GHJ distraction   The manual therapy interventions were performed at a separate and distinct time from the therapeutic activities interventions. Rationale: decrease pain, increase ROM, increase tissue extensibility, decrease trigger points and increase postural awareness to improve ease of ADLs in the home environment. With   [] TE   [] TA   [] neuro   [] other: Patient Education: [x] Review HEP    [] Progressed/Changed HEP based on:   [] positioning   [] body mechanics   [] transfers   [] heat/ice application    [] other:      Other Objective/Functional Measures:   Supine ER AAROM 30 deg  Supine flexion PROM 102 deg  Supine abduction PROM 70 deg     Pain Level (0-10 scale) post treatment: 0    ASSESSMENT/Changes in Function: Patient education provided today on adhesive capsulitis and trying as able to work through some slight soreness/pain to achieve improved joint mobility. With this in mind, pt able to complete increased PROM into GHJ flexion/ER with very minimal discomfort during the session and no pain following modality. Progress as tolerated.     Patient will continue to benefit from skilled PT services to modify and progress therapeutic interventions, address functional mobility deficits, address ROM deficits, address strength deficits, analyze and address soft tissue restrictions, analyze and cue movement patterns, analyze and modify body mechanics/ergonomics, assess and modify postural abnormalities, address imbalance/dizziness and instruct in home and community integration to attain remaining goals. []  See Plan of Care  []  See progress note/recertification  []  See Discharge Summary         Progress towards goals / Updated goals:  1. Pt will be independent and compliant with updated HEP to progress to DC. - updated HEP today 6/23/2022  2.  Pt will have an increase in AROM of the R GHJ to > or = 150 deg elevation with normal scapulohumeral rhythm to restore ADLs and driving. Current: progressing, PROM 102 deg (6/28/22)  3. Pt will have > or = 4/5 MMT of R GHJ flexion, scaption, ER and IR to begin to restore ADLs, self-care, dressing. 4. Patient will demo PROM ER to 40 deg per protocol for progression to decrease impingement risk.   Current: - PROM ER to 30 deg following manual therapy (6/28/2022)   5. Patient will increase FOTO score to 64/100 for indications of increased functional mobility.   PN due 7/21/22    PLAN  [x]  Upgrade activities as tolerated     [x]  Continue plan of care  []  Update interventions per flow sheet       []  Discharge due to:_  []  Other:_      Genesis Govea 6/28/2022  10:41 AM    Future Appointments   Date Time Provider Jami Parrish   6/28/2022  2:00 PM Qiana Lightning [facility]   6/30/2022  1:15 PM Kaiser Shoulder., PT [facility]   7/6/2022  2:00 PM Monterey Lightning [facility]   7/7/2022 11:30 AM Luz Marina Nur PT [facility]   7/11/2022 11:30 AM Qiana Lightning [facility]   7/13/2022  2:45 PM Monterey Lightning [facility]   7/19/2022 11:30 AM Luz Marina Nur PT [facility]   7/21/2022 11:30 AM Karma Nickye, PT [facility]   7/26/2022 11:30 AM Karma Nickye, PT [facility]   7/28/2022 11:30 AM Karma Nickye, PT [facility]   8/2/2022 11:30 AM Karma Nickye, PT [facility]   8/4/2022 11:30 AM Karma Nickye, PT [facility]   8/9/2022 11:30 AM Karma Remingtonkle, PT [facility]   8/11/2022 11:30 AM Mar Balzarine, PT [facility]   8/16/2022 11:30 AM Mar Balzarine, PT [facility]   8/18/2022 11:30 AM Mar Balzarine, PT [facility]   8/23/2022 11:30 AM Mar Balzarine, PT [facility]   8/25/2022 11:30 AM Mar Balzarine, PT [facility]   8/30/2022 11:30 AM Mar Balzarine, PT [facility]   9/1/2022 11:30 AM Mar Balzarine, PT [facility]

## 2022-06-30 ENCOUNTER — HOSPITAL ENCOUNTER (OUTPATIENT)
Dept: PHYSICAL THERAPY | Age: 57
Discharge: HOME | End: 2022-06-30
Payer: COMMERCIAL

## 2022-06-30 PROCEDURE — 97140 MANUAL THERAPY 1/> REGIONS: CPT

## 2022-06-30 PROCEDURE — 97110 THERAPEUTIC EXERCISES: CPT

## 2022-06-30 NOTE — PROGRESS NOTES
PT DAILY TREATMENT NOTE     Patient Name: Althea Leiva  Date:2022  : 1965  [x]  Patient  Verified  Payor: BLUE CROSS / Plan: [employer] [place] [address] [place] / Product Type: PPO /    In time:115 Out time:225  Total Treatment Time (min): 70  Visit #: 3 of     Medicare/BCBS Only   Total Timed Codes (min):  60 1:1 Treatment Time:  60       Treatment Area: Right shoulder pain [M25.511]  Acute post-operative pain [G89.18]    SUBJECTIVE  Pain Level (0-10 scale): 0-1/10   Any medication changes, allergies to medications, adverse drug reactions, diagnosis change, or new procedure performed?: [x] No    [] Yes (see summary sheet for update)  Subjective functional status/changes:   [] No changes reported  Patient reports just the normal achiness - \"when will that go away\"    OBJECTIVE    Modality rationale: decrease inflammation and decrease pain to improve the patients ability to relax and sleep following therapy session to improve restorative sleep patterns.     Min Type Additional Details    [x] Estim:  []Unatt       []IFC  []Premod                        []Other:  []w/ice   []w/heat  Position:   Location:     [] Estim: []Att    []TENS instruct  []NMES                    []Other:  []w/US   []w/ice   []w/heat  Position:  Location:    []  Traction: [] Cervical       []Lumbar                       [] Prone          []Supine                       []Intermittent   []Continuous Lbs:  [] before manual  [] after manual    []  Ultrasound: []Continuous   [] Pulsed                           []1MHz   []3MHz W/cm2:  Location:    []  Iontophoresis with dexamethasone         Location: [] Take home patch   [] In clinic    []  Ice     []  heat  []  Ice massage  []  Laser   []  Anodyne Position:   Location:     []  Laser with stim  []  Other:  Position:  Location:   NC  [x]  Vasopneumatic Device    [x]  Right     []  Left  Pre-treatment girth: not taken  Post-treatment girth: not taken   Measured at (location): axillary fold Pressure:       [x] lo [] med [] hi   Temperature: [x] lo [] med [] hi   [x] Skin assessment post-treatment:  [x]intact []redness- no adverse reaction    []redness - adverse reaction:       45 min Therapeutic Exercise:  [x] See flow sheet :  Standing walk outs for wt bearing - hands on wall in \"wall push up position\" and walk feet out till increased pressure through UE. Rationale: increase ROM and increase strength to improve the patients ability to perform ADLs with improved shoulder/elbow strength and mobility for return to PLOF     15 min Manual Therapy: R shoulder distraction/ inferior glide , PROM with endrange overpressure into flexion and IR to belly , scap UR mobs, lat stretch via scap depression    The manual therapy interventions were performed at a separate and distinct time from the therapeutic activities interventions. Rationale: decrease pain, increase ROM, increase tissue extensibility, decrease trigger points and increase postural awareness to improve ease of ADLs in the home environment and community for reaching and UE activities           With  rx Patient Education: [x] Review HEP  - at home pulleys  Arm swing with gait      Other Objective/Functional Measures:   Functional progression - added pulleys for functional reach     Palpation - noted periscapular atrophy leading to scap winging at rest    HEP compliance - 2-3 x per day for stretching and 1x per day for strengthening      Pain Level (0-10 scale) post treatment: 0    ASSESSMENT/Changes in Function: Patient is 10 weeks/ 2 days post op and still in phase III of protocol till 7/12. MD sanchez on 7/13. Patient reports no adverse reaction to initiation of iso last visit. Initiated pulleys today and educated on pulleys for home. Patient had to take rest break during iso sec to fatigue but denies pain, just m soreness.   Note scap elevation during wt bearing walk outs sec to poor scapulohumeral rhythm - modified to shoulder at approx 80 deg flexion. Initiated rows for scap stability. Initiated scap mobility during MT with good response. Patient will continue to benefit from skilled PT services to modify and progress therapeutic interventions, address functional mobility deficits, address ROM deficits, address strength deficits, analyze and address soft tissue restrictions, analyze and cue movement patterns, analyze and modify body mechanics/ergonomics, assess and modify postural abnormalities, address imbalance/dizziness and instruct in home and community integration to attain remaining goals. []  See Plan of Care  [x]  See progress note/recertification  []  See Discharge Summary         Progress towards goals / Updated goals:  1. Pt will be independent and compliant with updated HEP to progress to DC. - updated HEP today 6/23/2022  2.  Pt will have an increase in AROM of the R GHJ to > or = 150 deg elevation with normal scapulohumeral rhythm to restore ADLs and driving. Current: progressing, PROM 102 deg (6/28/22)  3. Pt will have > or = 4/5 MMT of R GHJ flexion, scaption, ER and IR to begin to restore ADLs, self-care, dressing. 4. Patient will demo PROM ER to 40 deg per protocol for progression to decrease impingement risk. Current: - PROM ER to 30 deg following manual therapy (6/28/2022)   5. Patient will increase FOTO score to 64/100 for indications of increased functional mobility.   Current: slow progress given protocol 6/30/2022  PN due 7/21/22    PLAN  [x]  Upgrade activities as tolerated     [x]  Continue plan of care  []  Update interventions per flow sheet       []  Discharge due to:_  [x]  Other:_assess response to progression    Cont scap mob and lat stretching         Judith Huynh, PT 6/30/2022  10:41 AM    Future Appointments   Date Time Provider Jami Parrish   6/30/2022  1:15 PM Marisela Angel, PT [facility]   7/6/2022  2:00 PM Tabitha Bailey [facility]   7/7/2022 11:30 AM Stephanie Camacho, PT [facility] 7/11/2022 11:30 AM Mac Neo [facility]   7/13/2022  2:45 PM Mac Neo [facility]   7/19/2022 11:30 AM Ernestene Semen, PT [facility]   7/21/2022 11:30 AM Ernestene Semen, PT [facility]   7/26/2022 11:30 AM Ernestene Semen, PT [facility]   7/28/2022 11:30 AM Ernestene Semen, PT [facility]   8/2/2022 11:30 AM Ernestene Semen, PT [facility]   8/4/2022 11:30 AM Ernestene Semen, PT [facility]   8/9/2022 11:30 AM Ernestene Semen, PT [facility]   8/11/2022 11:30 AM Ernestene Semen, PT [facility]   8/16/2022 11:30 AM Ernestene Semen, PT [facility]   8/18/2022 11:30 AM Ernestene Semen, PT [facility]   8/23/2022 11:30 AM Ernestene Semen, PT [facility]   8/25/2022 11:30 AM [facility]   8/30/2022 11:30 AM Ernestene Semen, PT [facility]   9/1/2022 11:30 AM Ernestene Semen, PT [facility]

## 2022-07-06 ENCOUNTER — HOSPITAL ENCOUNTER (OUTPATIENT)
Dept: PHYSICAL THERAPY | Age: 57
Discharge: HOME | End: 2022-07-06
Payer: COMMERCIAL

## 2022-07-06 PROCEDURE — 97140 MANUAL THERAPY 1/> REGIONS: CPT

## 2022-07-06 PROCEDURE — 97110 THERAPEUTIC EXERCISES: CPT

## 2022-07-06 NOTE — PROGRESS NOTES
PT DAILY TREATMENT NOTE     Patient Name: Denisa Washington  PURC:[id number]  : 1965  [x]  Patient  Verified  Payor: BLUE CROSS / Plan: [address] [place] [address] [place] / Product Type: PPO /    In time: 5:45 pm         Out time: 6:54 pm  Total Treatment Time (min): 69  Visit #: 4 of     Medicare/BCBS Only   Total Timed Codes (min):  59 1:1 Treatment Time:  47       Treatment Area: Right shoulder pain [M25.511]  Acute post-operative pain [G89.18]    SUBJECTIVE  Pain Level (0-10 scale): 0  Any medication changes, allergies to medications, adverse drug reactions, diagnosis change, or new procedure performed?: [x] No    [] Yes (see summary sheet for update)  Subjective functional status/changes:   [] No changes reported  Patient apologetic about a change in her schedule today as her son left a pan on the stove over three hours. Patient notes she was worried about priscilla poisoning and had fire department assess her house. OBJECTIVE  Modality rationale: decrease inflammation and decrease pain to improve the patients ability to relax and sleep following therapy session to improve restorative sleep patterns.     Min Type Additional Details    [] Estim:  []Unatt       []IFC  []Premod                        []Other:  []w/ice   []w/heat  Position:   Location:     []  Ice     []  Heat Position:   Location:    10 [x]  Vasopneumatic Device    [x]  Right     []  Left  Pre-treatment girth: not taken  Post-treatment girth: not taken   Measured at (location): axillary fold Pressure:       [x] lo [] med [] hi   Temperature: [x] lo [] med [] hi   [x] Skin assessment post-treatment:  [x]intact []redness- no adverse reaction    []redness - adverse reaction:       44 min Therapeutic Exercise:  [x] See flow sheet:    Rationale: increase ROM and increase strength to improve the patients ability to perform ADLs with improved shoulder/elbow strength and mobility for return to PLOF     15 min Manual Therapy: R shoulder distraction/ inferior glide , PROM with endrange overpressure into flexion and IR to belly , scap UR mobs, lat stretch via scap depression    The manual therapy interventions were performed at a separate and distinct time from the therapeutic activities interventions. Rationale: decrease pain, increase ROM, increase tissue extensibility, decrease trigger points and increase postural awareness to improve ease of ADLs in the home environment and community for reaching and UE activities           with  treatment Patient Education: [x] Review HEP - added SL ER to be performed 1-2x daily or other every day based on reactive soreness; focus on scapular positioning with standing counter supported flexion stretching to address lat tightness (patient able to replicate properly in clinic)     Other Objective/Functional Measures:   (R) shoulder AROM: seated flexion 78 deg, ER 15 deg, FIR to level of lateral glut with dyskinesia, seated abduction 51 deg    Pain Level (0-10 scale) post treatment: 0    ASSESSMENT/Changes in Function:   Patient responded well to active RTC strengthening in the S/L position. Noted improvement in active assisted ER to 30 deg after therapeutic interventions. Patient will continue to benefit from skilled PT services to modify and progress therapeutic interventions, address functional mobility deficits, address ROM deficits, address strength deficits, analyze and address soft tissue restrictions, analyze and cue movement patterns, analyze and modify body mechanics/ergonomics, assess and modify postural abnormalities, address imbalance/dizziness and instruct in home and community integration to attain remaining goals.      []  See Plan of Care  [x]  See progress note/recertification  []  See Discharge Summary         Progress towards goals / Updated goals:  1. Pt will be independent and compliant with updated HEP to progress to DC. - updated HEP today 6/23/2022  2.  Pt will have an increase in AROM of the R GHJ to > or = 150 deg elevation with normal scapulohumeral rhythm to restore ADLs and driving. Current: progressing, PROM 102 deg (6/28/22) - AROM 78 deg in sitting (7/6/22)  3. Pt will have > or = 4/5 MMT of R GHJ flexion, scaption, ER and IR to begin to restore ADLs, self-care, dressing. 4. Patient will demo PROM ER to 40 deg per protocol for progression to decrease impingement risk. Current: Cyrena Bridges ER to 30 deg following manual therapy (7/6/2022)   5. Patient will increase FOTO score to 64/100 for indications of increased functional mobility.   Current: slow progress given protocol 6/30/2022  PN due 7/21/22    PLAN  [x]  Upgrade activities as tolerated     [x]  Continue plan of care  []  Update interventions per flow sheet       []  Discharge due to:_  []  Other:_        Nickolas Hernandez, JULES 7/6/2022    Future Appointments   Date Time Provider Jami Parrish   7/7/2022 11:30 AM Jaja Ask, PT [facility]   7/11/2022 11:30 AM [facility]   7/13/2022  2:45 PM [facility]   7/19/2022 11:30 AM Jaja Ask, PT [facility]   7/21/2022 11:30 AM [place] Ask, PT [facility]   7/26/2022 11:30 AM [place] Ask, PT [facility]   7/28/2022 11:30 AM [place] Ask, PT [facility]   8/2/2022 11:30 AM [place] Ask, PT [facility]   8/4/2022 11:30 AM [place] Ask, PT [facility]   8/9/2022 11:30 AM [place] Ask, PT [facility]   8/11/2022 11:30 AM Jaja Ask, PT [facility]   8/16/2022 11:30 AM Jaja Ask, PT [facility]   8/18/2022 11:30 AM Jaja Berg, PT [facility]   8/23/2022 11:30 AM Jaja Berg, PT [facility]   8/25/2022 11:30 AM Jaja Berg, PT [facility]   8/30/2022 11:30 AM Jaja Berg, PT [facility]   9/1/2022 11:30 AM Jaja Berg, PT [facility]

## 2022-07-07 ENCOUNTER — HOSPITAL ENCOUNTER (OUTPATIENT)
Dept: PHYSICAL THERAPY | Age: 57
Discharge: HOME | End: 2022-07-07
Payer: COMMERCIAL

## 2022-07-07 PROCEDURE — 97140 MANUAL THERAPY 1/> REGIONS: CPT

## 2022-07-07 PROCEDURE — 97110 THERAPEUTIC EXERCISES: CPT

## 2022-07-07 NOTE — PROGRESS NOTES
PT DAILY TREATMENT NOTE     Patient Name: Eliza Haro  CQUF:[id number]  : 1965  [x]  Patient  Verified  Payor: BLUE CROSS / Plan: [facility] [facility] [place] / Product Type: PPO /    In time: 11:35         Out time: 12:42  Total Treatment Time (min): 67  Visit #: 5 of     Medicare/BCBS Only   Total Timed Codes (min): 57 1:1 Treatment Time:  57       Treatment Area: Right shoulder pain [M25.511]  Acute post-operative pain [G89.18]    SUBJECTIVE  Pain Level (0-10 scale): 2  Any medication changes, allergies to medications, adverse drug reactions, diagnosis change, or new procedure performed?: [x] No    [] Yes (see summary sheet for update)  Subjective functional status/changes:   [] No changes reported  \" I was sore from last night's workout and then recovered well with heat today. I'm sore in the lower shoulder blades. Very minimal R forearm symptoms but those really resolved                                                                                                                                                                                                                                                                                                                                                                                                                                                OBJECTIVE  Modality rationale: decrease inflammation and decrease pain to improve the patients ability to relax and sleep following therapy session to improve restorative sleep patterns. Min Type Additional Details   10 min  VASO in use.    Used ice to R shoulder while sitting in chair      []  Vasopneumatic Device    [x]  Right     []  Left  Pre-treatment girth: not taken  Post-treatment girth: not taken   Measured at (location): axillary fold Pressure:       [x] lo [] med [] hi   Temperature: [x] lo [] med [] hi   [x] Skin assessment post-treatment:  [x]intact []redness- no adverse reaction    []redness - adverse reaction:       39 min Therapeutic Exercise:  [x] See flow sheet:     -initiated UBE f/b for ROM and strength  -wall ladder added today   -Added cane H adduction behind back  And cane extension (pt can't tolerate full FIR)   -UE CKC weight bearing walk outs performed with low table, standing at end of table walking forward into a modified plank/QP position and back   -ER walkouts with red tband added today to progress isometrics      Rationale: increase ROM and increase strength to improve the patients ability to perform ADLs with improved shoulder/elbow strength and mobility for return to PLOF     18 min Manual Therapy: R shoulder distraction/ inferior glide, posterior mobs grade IV, PROM with endrange overpressure into flexion and IR to belly, scaption, ER; PNF D1 and D2;     NV as needed: scap UR mobs, lat stretch via scap depression    The manual therapy interventions were performed at a separate and distinct time from the therapeutic activities interventions. Rationale: decrease pain, increase ROM, increase tissue extensibility, decrease trigger points and increase postural awareness to improve ease of ADLs in the home environment and community for reaching and UE activities           with  treatment Patient Education: [x] Review HEP     End range stretching to tolerance       Other Objective/Functional Measures:   Pt is 11 weeks 2 days post operative     (R) shoulder AROM: seated flexion  78 deg, ER 15 deg, FIR to level of lateral glut with dyskinesia, SL abduction: 90 deg   Supine flexion AAROM: 116 deg ; AROM 109 deg   PROM ER at 30: 40 deg   AAROM ext: 34 deg       Pain Level (0-10 scale) post treatment: 0    ASSESSMENT/Changes in Function:   Firm end feel at the GHJ all planes but overall progressing with PROM, AAROM and AROM. She con't to be behind ideal goals, as she has been from the beginning but is progressing. Less c/o pain/ soreness with increased MT to end ranges.      Patient will continue to benefit from skilled PT services to modify and progress therapeutic interventions, address functional mobility deficits, address ROM deficits, address strength deficits, analyze and address soft tissue restrictions, analyze and cue movement patterns, analyze and modify body mechanics/ergonomics, assess and modify postural abnormalities, address imbalance/dizziness and instruct in home and community integration to attain remaining goals. []  See Plan of Care  [x]  See progress note/recertification  []  See Discharge Summary         Progress towards goals / Updated goals:  1. Pt will be independent and compliant with updated HEP to progress to DC. Current: updated HEP today (7/5/22)  2.  Pt will have an increase in AROM of the R GHJ to > or = 150 deg elevation with normal scapulohumeral rhythm to restore ADLs and driving. Current: progressing, AAROM 116; PROM 120 deg;  AROM 78 deg in sitting and 109 in supine (7/7/22)  3. Pt will have > or = 4/5 MMT of R GHJ flexion, scaption, ER and IR to begin to restore ADLs, self-care, dressing. Current: Con't with SL ER and abduction; progressed bands (7/7/22)  4. Patient will demo PROM ER to 40 deg per protocol for progression to decrease impingement risk. Current: - PROM ER 40 deg  (7/7/2022)   5. Patient will increase FOTO score to 64/100 for indications of increased functional mobility. Current: slow progress given protocol 6/30/2022  PN due 7/21/22    PLAN  [x]  Upgrade activities as tolerated     [x]  Continue plan of care  []  Update interventions per flow sheet       []  Discharge due to:_  [x]  Other:_PN due by next Wednesday for MD/ PA appt.          Moody Lange, PT 7/7/2022    Future Appointments   Date Time Provider Jami Parrish   7/7/2022 11:30 AM Compa Davis PT [facility]   [facility]13[facility]2022  [facility]   7/19/2022 11:30 AM Compa Davis PT [facility] 7/21/2022 11:30 AM Jerelvie Pickeringot, PT [facility]   7/26/2022 11:30 AM Jerelvie Pickeringot, PT [facility]   7/28/2022 11:30 AM Jerelvie Pickeringot, PT [facility]   8/2/2022 11:30 AM Ivonne Pickeringot, PT [facility]   8/4/2022 11:30 AM Jerelvie Pickeringot, PT [facility]   8/9/2022 11:30 AM Ivonne Pickeringot, PT [facility]   8/11/2022 11:30 AM Ivonne Pickeringot, PT [facility]   8/16/2022 11:30 AM Ivonne Pickeringot, PT [facility]   8/18/2022 11:30 AM Ivonne Pickeringot, PT [facility]   8/23/2022 11:30 AM Ivonne Pickeringot, PT [facility]   8/25/2022 11:30 AM Ivonne Pickeringot, PT [facility]   8/30/2022 11:30 AM Ivonne Pickeringot, PT [facility]   9/1/2022 11:30 AM Jerelvie Pickeringot, PT [facility]

## 2022-07-11 ENCOUNTER — HOSPITAL ENCOUNTER (OUTPATIENT)
Dept: PHYSICAL THERAPY | Age: 57
Discharge: HOME | End: 2022-07-11
Payer: COMMERCIAL

## 2022-07-11 PROCEDURE — 97530 THERAPEUTIC ACTIVITIES: CPT

## 2022-07-11 PROCEDURE — 97110 THERAPEUTIC EXERCISES: CPT

## 2022-07-11 PROCEDURE — 97140 MANUAL THERAPY 1/> REGIONS: CPT

## 2022-07-11 NOTE — PROGRESS NOTES
PT DAILY TREATMENT NOTE     Patient Name: Sia Whatley  Date:2022  : 1965  [x]  Patient  Verified  Payor: BLUE CROSS / Plan: [employer] [facility] / Product Type: PPO /    In time:11:35  Out time:12:44  Total Treatment Time (min): 69  Visit #: 6 of     Medicare/BCBS Only   Total Timed Codes (min):  69 1:1 Treatment Time:  56       Treatment Area: Right shoulder pain [M25.511]  Acute post-operative pain [G89.18]    SUBJECTIVE  Pain Level (0-10 scale): 3  Any medication changes, allergies to medications, adverse drug reactions, diagnosis change, or new procedure performed?: [x] No    [] Yes (see summary sheet for update)  Subjective functional status/changes:   [] No changes reported  \"I am having constant aching in my shoulder, it has really been bothering me with soreness this weekend, I had to keep getting up to get the ice pack. \"    OBJECTIVE    42 min Therapeutic Exercise:  [x] See flow sheet :   Rationale: increase ROM and increase strength to improve the patients ability to perform ADLs with improved shoulder/elbow strength and mobility. 13 min Therapeutic Activity:  [x]  See flow sheet :   Rationale: increase ROM, increase strength, improve coordination, improve balance, and increase proprioception  to improve the patients ability to perform functional overhead/forward lifts. Patient education: reassessment, FOTO, discussion concerning typical RTC AROM recovery times based on conservative/aggressive early mobility     14 min Manual Therapy:  R shoulder inferior glide grade 2-3, posterior glide grade 2-3, manual stretching into flexion/scaption/ER with scap stabilization and GHJ distraction   The manual therapy interventions were performed at a separate and distinct time from the therapeutic activities interventions.   Rationale: decrease pain, increase ROM, increase tissue extensibility, decrease trigger points and increase postural awareness to improve ease of ADLs in the home environment. With   [] TE   [x] TA   [] neuro   [] other: Patient Education: [x] Review HEP    [] Progressed/Changed HEP based on:   [] positioning   [] body mechanics   [] transfers   [] heat/ice application    [] other:      Other Objective/Functional Measures:   Shoulder AROM/PROM:                                           AROM (deg)              PROM (deg)   Right Left Right Left   Flexion 124  116    Extension   34    Scaption       ER at 45 Deg ABD 15  40    IR at 39 Deg ABD       Seated Flexion  78  --------------- ---------------     Functional IR: right to right ischial tuberosity, left to mid thoracic spine    Strength:   Right (/5) Left (/5)   GHJ   Flexion 4- 5             Abduction 4- 5             Extension 5 5             ER 4 5             IR 4 5       Pain Level (0-10 scale) post treatment: 0-1    ASSESSMENT/Changes in Function: Patient has attended PT for 20 sessions for the treatment of right large rotator cuff repair with associated biceps tenodesis and SAD. The patient demonstrates continued slow progress at this time, with her range of motion continuing to be considerably diminished in a capsular pattern. She does not increased ability to use right arm for ADLs at this time but remains unable to reach behind her back or overhead.  Additional assessment includes:  Subjective Gains: improved ability to use right arm for eating, washing face; improved shoulder strength; more natural right shoulder movement; able to sleep a little bit on right side  Subjective Deficits: unable to reach to back of head, unable to reach behind back, unable to sleep due to right shoulder achiness  % improvement: 50%  Pain   Average: 1-2/10       Best: 1-2/10     Worst: 3/10  Patient Goal: \"full mobility\"      Patient will continue to benefit from skilled PT services to modify and progress therapeutic interventions, address functional mobility deficits, address ROM deficits, address strength deficits, analyze and address soft tissue restrictions, analyze and cue movement patterns, analyze and modify body mechanics/ergonomics, assess and modify postural abnormalities, address imbalance/dizziness and instruct in home and community integration to attain remaining goals. []  See Plan of Care  []  See progress note/recertification  []  See Discharge Summary         Progress towards goals / Updated goals:  1. Pt will be independent and compliant with updated HEP to progress to DC.   Current: updated HEP today (7/5/22)  2.  Pt will have an increase in AROM of the R GHJ to > or = 150 deg elevation with normal scapulohumeral rhythm to restore ADLs and driving. Current: progressing, AAROM 116; PROM 120 deg;  AROM 78 deg in sitting and 109 in supine (7/7/22)  3. Pt will have > or = 4/5 MMT of R GHJ flexion, scaption, ER and IR to begin to restore ADLs, self-care, dressing. Current: progressing, 4-/5 grossly in right shoulder (7/11/22)  4. Patient will demo PROM ER to 40 deg per protocol for progression to decrease impingement risk. Current: - PROM ER 40 deg  (7/7/2022)   5. Patient will increase FOTO score to 64/100 for indications of increased functional mobility.   Current: progressing, FOTO 50 pts (7/11/22)  PN due 7/21/22    PLAN  [x]  Upgrade activities as tolerated     [x]  Continue plan of care  []  Update interventions per flow sheet       []  Discharge due to:_  []  Other:_      Mode Love 7/11/2022  9:11 AM    Future Appointments   Date Time Provider Jami Parrish   7/11/2022 11:30 AM Michelle Hernandez [facility]   7/13/2022  2:45 PM Michelle Hernandez [facility]   7/19/2022 11:30 AM Rubina Junior PT [facility]   7/21/2022 11:30 AM Rubina Junior PT [facility]   7/26/2022 11:30 AM Rubina Junior PT [facility]   7/28/2022 11:30 AM Rubina Junior, PT [facility]   8/2/2022 11:30 AM Rubina Junior, PT [facility]   8/4/2022 11:30 AM Rubina Junior, PT [facility]   8/9/2022 11:30 AM Isidoro Jara Naveen Graft, PT [facility]   8/11/2022 11:30 AM Clinton Erm, PT [facility]   8/16/2022 11:30 AM Clinton Erm, PT [facility]   8/18/2022 11:30 AM Clinton Erm, PT [facility]   8/23/2022 11:30 AM Clinton Erm, PT [facility]   8/25/2022 11:30 AM Clinton Erm, PT [facility]   8/30/2022 11:30 AM Clinton Erm, PT [facility]   9/1/2022 11:30 AM Clinton Erm, PT [facility]

## 2022-07-12 NOTE — PROGRESS NOTES
[facility] AT [address] Kalie Booth  Phone: [phone] Fax: [phone]  PROGRESS NOTE  Patient Name: Michael Burnham : 1965   Treatment/Medical Diagnosis: Right shoulder pain [M25.511]  Acute post-operative pain [G89.18]   Referral Source: Adrianne Nixon*     Date of Initial Visit: 22 Attended Visits: 20 Missed Visits: 0     SUMMARY OF TREATMENT  Pt is a 61 y. o. year old female who presents with  s/p R shoulder large RTC repair and biceps tenodesis DOS 2022. Ther ex including distal wrist strengthening and ROM; elbow and shoulder PROM: scapular activation;  strength; manual therapy including:scapular mobs; PROM R shoulder in protocol, PROM elbow and wrist as needed; STM to mid to proximal UT and LS (no MFR to surgically involved mm) for pain relief;  Patient education; HEP, cryotherapy for edema and pain management. CURRENT STATUS  Patient has attended PT for 20 sessions for the treatment of right large rotator cuff repair with associated biceps tenodesis and SAD. The patient demonstrates continued slow progress at this time, with her range of motion continuing to be considerably diminished in a capsular pattern. Concern persists for possible development of adhesive capsulitis as opposed to post-operative stiffness.  - She has demonstrated recent improvements in her range of motion after progression to next phase of rehabilitation allowing for additional stretching/joint mobilization.   - She does note increased ability to use right arm for ADLs at this time but remains unable to reach behind her back or overhead. - Her strength is slowly improving.   -She is affect by persistent aching joint line pain likely associated with continued shoulder stiffness.    Additional assessment includes:  Subjective Gains: improved ability to use right arm for eating, washing face; improved shoulder strength; more natural right shoulder movement; able to sleep a little bit on right side  Subjective Deficits: unable to reach to back of head, unable to reach behind back, unable to sleep due to right shoulder achiness  % improvement: 50%  Pain   Average: 1-2/10                     Best: 1-2/10                   Worst: 3/10  Patient Goal: \"full mobility\"  Objective Measures:   Shoulder AROM/PROM:                                           AROM (deg)              PROM (deg)    Right Left Right Left   Flexion 124   116     Extension     34     Scaption           ER at 45 Deg ABD 15   40     IR at 45 Deg ABD           Seated Flexion  78   --------------- ---------------      Functional IR: right to right ischial tuberosity, left to mid thoracic spine     Strength: Performed at gross motor \"make\" testing    Right (/5) Left (/5)   GHJ   Flexion 4- 5             Abduction 4- 5             Extension 5 5             ER 4 5             IR 4 5      Current Goals:  1. Pt will be independent and compliant with updated HEP to progress to DC.   Current: updated HEP today (7/5/22)  2.  Pt will have an increase in AROM of the R GHJ to > or = 150 deg elevation with normal scapulohumeral rhythm to restore ADLs and driving. Current: progressing, AAROM 116; PROM 120 deg;  AROM 78 deg in sitting and 109 in supine (7/7/22)  3. Pt will have > or = 4/5 MMT of R GHJ flexion, scaption, ER and IR to begin to restore ADLs, self-care, dressing. Current: progressing, 4-/5 grossly in right shoulder (7/11/22)  4. Patient will demo PROM ER to 40 deg per protocol for progression to decrease impingement risk. Current: - PROM ER 40 deg  (7/7/2022)   5. Patient will increase FOTO score to 64/100 for indications of increased functional mobility.   Current: progressing, FOTO 50 pts (7/11/22)        New Goals to be achieved in __10__  treatments:  1. Pt will be independent and compliant with updated HEP to progress to DC.   PN: recently updated   2.  Pt will have an increase in AROM of the R GHJ to > or = 150 deg elevation with normal scapulohumeral rhythm to restore ADLs and driving. PN: AAROM 116; PROM 120 deg;  AROM 78 deg in sitting and 109 in supine (7/7/22)  3. Pt will have > or = 4/5 MMT of R GHJ flexion, scaption, ER and IR to begin to restore ADLs, self-care, dressing. PN: 4-/5 grossly in right shoulder   4. Patient will demo AROM ER to 60 deg per protocol for progression to decrease impingement risk. PN: PROM ER 40 deg   5. Patient will increase FOTO score to 64/100 for indications of increased functional mobility. PN: FOTO 50 pts     RECOMMENDATIONS  Pt to continue with skilled therapy for 2-3x/week for 10 sessions to improve shoulder mobility/strength to improve ease with overhead lifting, reaching, and donning/doffing clothes. If you have any questions/comments please contact us directly at ([phone]   Thank you for allowing us to assist in the care of your patient. Therapist Signature: Eloisa Rosenberg Date: 7/12/2022   Reporting Period  6/22/22-7/12/22 Time: 12:45 PM   NOTE TO PHYSICIAN:  PLEASE COMPLETE THE ORDERS BELOW AND FAX TO   [facility]: [phone]. If you are unable to process this request in 24 hours please contact our office: [phone].  ___ I have read the above report and request that my patient continue as recommended.   ___ I have read the above report and request that my patient continue therapy with the following changes/special instructions:_________________________________________________________   ___ I have read the above report and request that my patient be discharged from therapy. Physician Signature:        Date:       Time:                                                        Dayton Hughes*.

## 2022-07-13 ENCOUNTER — HOSPITAL ENCOUNTER (OUTPATIENT)
Dept: PHYSICAL THERAPY | Age: 57
Discharge: HOME | End: 2022-07-13
Payer: COMMERCIAL

## 2022-07-13 PROCEDURE — 97140 MANUAL THERAPY 1/> REGIONS: CPT

## 2022-07-13 PROCEDURE — 97110 THERAPEUTIC EXERCISES: CPT

## 2022-07-13 NOTE — PROGRESS NOTES
PT DAILY TREATMENT NOTE     Patient Name: Isaiah Landaverde  JYJQ:  : 1965  [x]  Patient  Verified  Payor: BLUE CROSS / Plan: [address] [place] [address] [place] / Product Type: PPO /    In time:12:15  Out time:1:20  Total Treatment Time (min): 65  Visit #: 1 of 10    Medicare/BCBS Only   Total Timed Codes (min):  65 1:1 Treatment Time:  60       Treatment Area: Right shoulder pain [M25.511]  Acute post-operative pain [G89.18]    SUBJECTIVE  Pain Level (0-10 scale): 2  Any medication changes, allergies to medications, adverse drug reactions, diagnosis change, or new procedure performed?: [x] No    [] Yes (see summary sheet for update)  Subjective functional status/changes:   [] No changes reported  \"The PA agreed that I do have adhesive capsulitis, she gave me a cortisone injection to help with it. \"    OBJECTIVE    53 min Therapeutic Exercise:  [x] See flow sheet :   Rationale: increase ROM and increase strength to improve the patients ability to perform ADLs with improved shoulder/elbow strength and mobility. 12 min Manual Therapy:  R shoulder inferior glide grade 2-3, posterior glide grade 2-3, manual stretching into flexion/scaption/ER with scap stabilization and GHJ distraction; grade IV GHJ distraction oscillations   The manual therapy interventions were performed at a separate and distinct time from the therapeutic activities interventions. Rationale: decrease pain, increase ROM, increase tissue extensibility, decrease trigger points and increase postural awareness to improve ease of ADLs in the home environment.           With   [] TE   [] TA   [] neuro   [] other: Patient Education: [x] Review HEP    [] Progressed/Changed HEP based on:   [] positioning   [] body mechanics   [] transfers   [] heat/ice application    [] other:      Other Objective/Functional Measures: -Supine flexion AROM 114     Pain Level (0-10 scale) post treatment: 2    ASSESSMENT/Changes in Function: Attempted reclined shoulder flexion at 30 deg but discontinued secondary to increased discomfort in area of pt's recent cortisone injection at posterolateral shoulder; pt also noting increased discomfort with ER walkouts so decreased number or repetitions. Patient will continue to benefit from skilled PT services to modify and progress therapeutic interventions, address functional mobility deficits, address ROM deficits, address strength deficits, analyze and address soft tissue restrictions, analyze and cue movement patterns, analyze and modify body mechanics/ergonomics, assess and modify postural abnormalities, address imbalance/dizziness and instruct in home and community integration to attain remaining goals. []  See Plan of Care  []  See progress note/recertification  []  See Discharge Summary         Progress towards goals / Updated goals:  1. Pt will be independent and compliant with updated HEP to progress to DC.   PN: recently updated   2.  Pt will have an increase in AROM of the R GHJ to > or = 150 deg elevation with normal scapulohumeral rhythm to restore ADLs and driving. PN: AAROM 116; PROM 120 deg;  AROM 78 deg in sitting and 109 in supine   Current: supine flexion AROM 114 deg, limited by some increased shoulder pain following injection (7/13/22)  3. Pt will have > or = 4/5 MMT of R GHJ flexion, scaption, ER and IR to begin to restore ADLs, self-care, dressing. PN: 4-/5 grossly in right shoulder   4. Patient will demo AROM ER to 60 deg per protocol for progression to decrease impingement risk. PN: PROM ER 40 deg   5. Patient will increase FOTO score to 64/100 for indications of increased functional mobility.   PN: FOTO 50 pts     PLAN  [x]  Upgrade activities as tolerated     [x]  Continue plan of care  []  Update interventions per flow sheet       []  Discharge due to:_  []  Other:_      Rochedung Kind 7/13/2022  9:10 AM    Future Appointments   Date Time Provider Jami Parrish   7/13/2022 12:15 PM Radha Morgan Eric Coleman [facility]   7/19/2022 11:30 AM Orpah Stai, PT [facility]   7/21/2022 11:30 AM Orpah Stai, PT [facility]   7/26/2022 11:30 AM Orpah Stai, PT [facility]   7/28/2022 11:30 AM Orpah Stai, PT [facility]   8/2/2022 11:30 AM Orpah Stai, PT [facility]   8/4/2022 11:30 AM Orpah Stai, PT [facility]   8/9/2022 11:30 AM Orpah Stai, PT [facility]   8/11/2022 11:30 AM Orpah Stai, PT [facility]   8/16/2022 11:30 AM Orpah Stai, PT [facility]   8/18/2022 11:30 AM Orpah Stai, PT [facility]   8/23/2022 11:30 AM Orpah Stai, PT [facility]   8/25/2022 11:30 AM Orpah Stai, PT [facility]   8/30/2022 11:30 AM Orpah Stai, PT [facility]   9/1/2022 11:30 AM Orpah Stai, PT [facility]

## 2022-07-19 ENCOUNTER — HOSPITAL ENCOUNTER (OUTPATIENT)
Dept: PHYSICAL THERAPY | Age: 57
Discharge: HOME | End: 2022-07-19
Payer: COMMERCIAL

## 2022-07-19 PROCEDURE — 97110 THERAPEUTIC EXERCISES: CPT

## 2022-07-19 PROCEDURE — 97140 MANUAL THERAPY 1/> REGIONS: CPT

## 2022-07-19 NOTE — PROGRESS NOTES
PT DAILY TREATMENT NOTE     Patient Name: Benjamin Magallanes  NUFR:[id number]  : 1965  [x]  Patient  Verified  Payor: BLUE CROSS / Plan: [employer] [facility] / Product Type: PPO /    In time:11:30 Out time:12:38  Total Treatment Time (min): 68  Visit #: 2 of 10    Medicare/BCBS Only   Total Timed Codes (min):  69 1:1 Treatment Time:  11:30-12:20 (50)       Treatment Area: Right shoulder pain [M25.511]  Acute post-operative pain [G89.18]    SUBJECTIVE  Pain Level (0-10 scale): 0  Any medication changes, allergies to medications, adverse drug reactions, diagnosis change, or new procedure performed?: [x] No    [] Yes (see summary sheet for update)  Subjective functional status/changes:   [] No changes reported  \"took a couple days but the cortisone injection seems to have helped in the ROM. When I was doing my PT I could get more external rotation, and lifting up and out. I want to check on the ER that i'm doing. It took away that constant dull ache. Doesn't wake me at night so that is an improvement. Stopped the ice and doing more heat. Getting stronger - doing more, lifting more and carrying more\"     OBJECTIVE    53 (35) min Therapeutic Exercise:  [x] See flow sheet :    -trouble shooting for doorway ER stretch; added L CS and trunk rotation to do body-on-arm stretch    Rationale: increase ROM and increase strength to improve the patients ability to perform ADLs with improved shoulder/elbow strength and mobility. 15 min Manual Therapy:  R shoulder inferior glide grade 2-3, posterior glide grade 2-3, manual stretching into flexion/scaption/ER with scap stabilization and GHJ distraction; grade IV GHJ distraction oscillations   The manual therapy interventions were performed at a separate and distinct time from the therapeutic activities interventions.   Rationale: decrease pain, increase ROM, increase tissue extensibility, decrease trigger points and increase postural awareness to improve ease of ADLs in the home environment. With   [] TE   [] TA   [] neuro   [] other: Patient Education: [x] Review HEP    [] Progressed/Changed HEP based on:   [] positioning   [] body mechanics   [] transfers   [] heat/ice application    [] other:     Supine \"H[place]ock\" (STACI) stretch to tolerance at home      Other Objective/Functional Measures: -Supine flexion AROM 114   PROM ER at 45: 50  Scaption 139   SL ER AROM at 0 deg : 45 deg     Pain Level (0-10 scale) post treatment: 0    ASSESSMENT/Changes in Function: pt progressing very well with ROM after the cortisone injection. Very compliant with HEP. Requires min VC for avoiding shoulder hike with ROM. Improving functional use of the arm and tolerance to strengthening and more aggressive stretching/HEP/MT    Patient will continue to benefit from skilled PT services to modify and progress therapeutic interventions, address functional mobility deficits, address ROM deficits, address strength deficits, analyze and address soft tissue restrictions, analyze and cue movement patterns, analyze and modify body mechanics/ergonomics, assess and modify postural abnormalities, address imbalance/dizziness and instruct in home and community integration to attain remaining goals. []  See Plan of Care  []  See progress note/recertification  []  See Discharge Summary         Progress towards goals / Updated goals:  1. Pt will be independent and compliant with updated HEP to progress to DC.   PN: recently updated   Current: updated with STACI Butterfly stretch in supine (7/19/22)  2.  Pt will have an increase in AROM of the R GHJ to > or = 150 deg elevation with normal scapulohumeral rhythm to restore ADLs and driving. PN: AAROM 116; PROM 120 deg;  AROM 78 deg in sitting and 109 in supine   Current: supine flexion PROM 139 deg (7/19/22)  3. Pt will have > or = 4/5 MMT of R GHJ flexion, scaption, ER and IR to begin to restore ADLs, self-care, dressing.   PN: 4-/5 grossly in right shoulder Current: progressing with fatigue but no pain (7/19/22)  4. Patient will demo AROM ER to 60 deg per protocol for progression to decrease impingement risk. PN: PROM ER 40 deg   Current: Goal progressing with SL ER at 0: 45 deg (7/19/22)  5. Patient will increase FOTO score to 64/100 for indications of increased functional mobility.   PN: FOTO 50 pts      PN due 8/11/22    PLAN  [x]  Upgrade activities as tolerated     [x]  Continue plan of care  []  Update interventions per flow sheet       []  Discharge due to:_  [x]  Other:_  Progress ROM to tolerance     Annabella Rubalcava, PT 7/19/2022  9:10 AM    Future Appointments   Date Time Provider Jami Parrish   7/19/2022 11:30 AM Baby Pagoda, PT [facility]   7/21/2022 11:30 AM Baby Pagoda, PT [facility]   7/26/2022 11:30 AM Baby Pagoda, PT [facility]   7/28/2022 11:30 AM Baby Pagoda, PT [facility]   8/2/2022 11:30 AM Baby Pagoda, PT [facility]   8/4/2022 11:30 AM Baby Pagoda, PT [facility]   8/9/2022 11:30 AM Baby Pagoda, PT [facility]   8/11/2022 11:30 AM Baby Pagoda, PT [facility]   8/16/2022 11:30 AM Baby Pagoda, PT [facility]   8/18/2022 11:30 AM Baby Pagoda, PT [facility]   8/23/2022 11:30 AM Baby Pagoda, PT [facility]   8/25/2022 11:30 AM Baby Pagoda, PT [facility]   8/30/2022 11:30 AM Kyle Xavier, PT [facility]   9/1/2022 11:30 AM Kyle Xavier, PT [facility]

## 2022-07-21 ENCOUNTER — HOSPITAL ENCOUNTER (OUTPATIENT)
Dept: PHYSICAL THERAPY | Age: 57
Discharge: HOME | End: 2022-07-21
Payer: COMMERCIAL

## 2022-07-21 PROCEDURE — 97140 MANUAL THERAPY 1/> REGIONS: CPT

## 2022-07-21 PROCEDURE — 97110 THERAPEUTIC EXERCISES: CPT

## 2022-07-21 NOTE — PROGRESS NOTES
PT DAILY TREATMENT NOTE     Patient Name: Shawna Tobias  LQV  : 1965  [x]  Patient  Verified  Payor: BLUE CROSS / Plan: [address] [place] [address] [place] / Product Type: PPO /    In time:11:34Out time:12:29  Total Treatment Time (min): 55  Visit #: 3 of 10    Medicare/BCBS Only   Total Timed Codes (min):  55 1:1 Treatment Time:  55       Treatment Area: Right shoulder pain [M25.511]  Acute post-operative pain [G89.18]    SUBJECTIVE  Pain Level (0-10 scale): 0  Any medication changes, allergies to medications, adverse drug reactions, diagnosis change, or new procedure performed?: [x] No    [] Yes (see summary sheet for update)  Subjective functional status/changes:   [] No changes reported  \"I survived going to [employer] with 3 boys. It was so hot and I felt so sick\". OBJECTIVE    35 min Therapeutic Exercise:  [x] See flow sheet :    -added wall flexion and scaption on ladder : flexion to 131 deg  -towel IR performed with strap instead of (B) FIR      Rationale: increase ROM and increase strength to improve the patients ability to perform ADLs with improved shoulder/elbow strength and mobility. 20 min Manual Therapy:  R shoulder inferior glide grade 2-3, posterior glide grade 2-3, manual stretching into flexion/scaption/ER/IR with scap stabilization and GHJ distraction; grade IV GHJ distraction oscillations; pNF D1 and D2; rhythmic stabs at 90 deg flexion    The manual therapy interventions were performed at a separate and distinct time from the therapeutic activities interventions. Rationale: decrease pain, increase ROM, increase tissue extensibility, decrease trigger points and increase postural awareness to improve ease of ADLs in the home environment.           With   [] TE   [] TA   [] neuro   [] other: Patient Education: [x] Review HEP    [] Progressed/Changed HEP based on:   [] positioning   [] body mechanics   [] transfers   [] heat/ice application    [] other:     -Supine \"Hammock\" (STACI) stretch to tolerance at home   -wall slides as needed  -discussed goals for R GHJ AROM as compared to the L as her normal ROM. -Progression of ROM  -avoid sharp pains with ROM but push to end range Firm end feel        Other Objective/Functional Measures:     Supine flexion AROM 114; sitting 110 with hike  AROM abduction: 85 before hike    PROM ER at 45: 50; at 90 : 55 deg  PROM IR at 45: 45 deg   Scaption: AAROM 136 on pulleys;  140 (supine before MT)  SL ER AROM at 0 deg : 45 deg   Extension: AAROM 41 deg (standing)  FIR AAROM: L5 with strap     Pain Level (0-10 scale) post treatment: 0    ASSESSMENT/Changes in Function: progressing and maintaining higher ranges of motion. Increasing tolerance to AROM with improved form. Still with firm end feel in all planes but incrasing ROM. Demo's hike with AROM in sitting. Patient will continue to benefit from skilled PT services to modify and progress therapeutic interventions, address functional mobility deficits, address ROM deficits, address strength deficits, analyze and address soft tissue restrictions, analyze and cue movement patterns, analyze and modify body mechanics/ergonomics, assess and modify postural abnormalities, address imbalance/dizziness and instruct in home and community integration to attain remaining goals. []  See Plan of Care  []  See progress note/recertification  []  See Discharge Summary         Progress towards goals / Updated goals:  1. Pt will be independent and compliant with updated HEP to progress to DC. PN: recently updated   Current: updated with STACI Butterfly stretch in supine (7/19/22)  2. Pt will have an increase in AROM of the R GHJ to > or = 150 deg elevation with normal scapulohumeral rhythm to restore ADLs and driving. PN: AAROM 116; PROM 120 deg;  AROM 78 deg in sitting and 109 in supine   Current: supine flexion PROM 139 deg (7/19/22)  3.  Pt will have > or = 4/5 MMT of R GHJ flexion, scaption, ER and IR to begin to restore ADLs, self-care, dressing. PN: 4-/5 grossly in right shoulder   Current: progressing with fatigue but no pain (7/19/22)  4. Patient will demo AROM ER to 60 deg per protocol for progression to decrease impingement risk. PN: PROM ER 40 deg   Current: Goal progressing with SL ER at 0: 45 deg (7/19/22)  5. Patient will increase FOTO score to 64/100 for indications of increased functional mobility.   PN: FOTO 50 pts      PN due 8/11/22    PLAN  [x]  Upgrade activities as tolerated     [x]  Continue plan of care  []  Update interventions per flow sheet       []  Discharge due to:_  [x]  Other:_  Progress ROM to tolerance     Alice Hodge, PT 7/21/2022  9:10 AM    Future Appointments   Date Time Provider Jami Parrish   7/21/2022 11:30 AM Alba Rosalino, PT [facility]   7/26/2022 11:30 AM Alba Rosalino, PT [facility]   7/28/2022 11:30 AM Alba Rosalino, PT [facility]   8/2/2022 11:30 AM Alba Rosalino, PT [facility]   8/4/2022 11:30 AM Alba Rosalino, PT [facility]   8/9/2022 11:30 AM Alba Rosalino, PT [facility]   8/11/2022 11:30 AM Alba Rosalino, PT [facility]   8/16/2022 11:30 AM Alba Rosalino, PT [facility]   8/18/2022 11:30 AM Alba Rosalino, PT [facility]   8/23/2022 11:30 AM Alba Rosalino, PT [facility]   8/25/2022 11:30 AM Stephanie Jerry, PT [facility]   8/30/2022 11:30 AM Stephanie Jerry, PT [facility]   9/1/2022 11:30 AM Stephanie Jerry, PT [facility]

## 2022-07-26 ENCOUNTER — HOSPITAL ENCOUNTER (OUTPATIENT)
Dept: PHYSICAL THERAPY | Age: 57
Discharge: HOME | End: 2022-07-26
Payer: COMMERCIAL

## 2022-07-26 PROCEDURE — 97110 THERAPEUTIC EXERCISES: CPT

## 2022-07-26 PROCEDURE — 97140 MANUAL THERAPY 1/> REGIONS: CPT

## 2022-07-26 NOTE — PROGRESS NOTES
PT DAILY TREATMENT NOTE     Patient Name: Khalif Pandey  Date:2022  : 1965  [x]  Patient  Verified  Payor: BLUE CROSS / Plan: [facility] / Product Type: PPO /    In time:11:36   Out time:12:33  Total Treatment Time (min): 57  Visit #: 4 of 10    Medicare/BCBS Only   Total Timed Codes (min):57 1:1 Treatment Time:  57       Treatment Area: Right shoulder pain [M25.511]  Acute post-operative pain [G89.18]    SUBJECTIVE  Pain Level (0-10 scale): 0  Any medication changes, allergies to medications, adverse drug reactions, diagnosis change, or new procedure performed?: [x] No    [] Yes (see summary sheet for update)  Subjective functional status/changes:   [] No changes reported  \"I'm doing well but I have no AC for the last 10 days and it's unbearable. I can put a clip in my hair on the L side of my head (cross body). I'm seeing improvements, so that is good\". Pt goal: rotation enough to put my hands on my hips    OBJECTIVE    39 min Therapeutic Exercise:  [x] See flow sheet :    Increased rows and extension   Added tband ER and IR AROM  Progressed cane flexion with 3# for strength and ROM. SA punch up to 4#   -wall ladder with lift off and eccentric lowering   -standing salute x5     Rationale: increase ROM and increase strength to improve the patients ability to perform ADLs with improved shoulder/elbow strength and mobility. 18 min Manual Therapy:  R shoulder inferior glide grade 2-3, posterior glide grade 2-3, manual stretching into flexion/scaption/ER/IR with scap stabilization and GHJ distraction; grade IV GHJ distraction oscillations; pNF D1 and D2; rhythmic stabs at 90 deg flexion and ER at 0    The manual therapy interventions were performed at a separate and distinct time from the therapeutic activities interventions.   Rationale: decrease pain, increase ROM, increase tissue extensibility, decrease trigger points and increase postural awareness to improve ease of ADLs in the home environment. With   [] TE   [] TA   [] neuro   [] other: Patient Education: [x] Review HEP    [] Progressed/Changed HEP based on:   [] positioning   [] body mechanics   [] transfers   [] heat/ice application    [] other:     -Supine \"Hammock\" (STACI) stretch to tolerance at home   -wall slides as needed  -discussed goals for R GHJ AROM as compared to the L as her normal ROM. -Progression of ROM  -avoid sharp pains with ROM but push to end range Firm end feel   -AROM  3 way cans with visual feedback for form  -avoid compensations with AROM     Other Objective/Functional Measures:     Supine flexion AROM 114; sitting 110 with hike; AAROM 147 deg  AROM abduction: 88 before hike    PROM ER at 45: 56; at 90 : 55 deg  PROM IR at 45: not tested today   Scaption: AAROM 136 on pulleys; 140 (supine before MT)  SL ER AROM at 0 de deg   Extension: AAROM 44 deg (standing)  FIR AAROM: L5 with strap      Pain Level (0-10 scale) post treatment: 0    ASSESSMENT/Changes in Function: Pt con't to have steady increases in ROM in all planes. Increasing strength as noticed by improved ROM with less hike. Still with firm joint feel and benefits from mobilizations. Patient will continue to benefit from skilled PT services to modify and progress therapeutic interventions, address functional mobility deficits, address ROM deficits, address strength deficits, analyze and address soft tissue restrictions, analyze and cue movement patterns, analyze and modify body mechanics/ergonomics, assess and modify postural abnormalities, address imbalance/dizziness and instruct in home and community integration to attain remaining goals. []  See Plan of Care  []  See progress note/recertification  []  See Discharge Summary         Progress towards goals / Updated goals:  1. Pt will be independent and compliant with updated HEP to progress to DC.    PN: recently updated   Current: updated with STACI Butterfly stretch in supine (7/19/22); 3 way cans (7/26/22)  2. Pt will have an increase in AROM of the R GHJ to > or = 150 deg elevation with normal scapulohumeral rhythm to restore ADLs and driving. PN: AAROM 116; PROM 120 deg;  AROM 78 deg in sitting and 109 in supine   Current: standing 90 deg with no hike; supine flexion AAROM 147deg (7/26/22)  3. Pt will have > or = 4/5 MMT of R GHJ flexion, scaption, ER and IR to begin to restore ADLs, self-care, dressing. PN: 4-/5 grossly in right shoulder   Current: progressing with fatigue but no pain (7/26/22)  4. Patient will demo AROM ER to 60 deg per protocol for progression to decrease impingement risk. PN: PROM ER 40 deg   Current: Goal progressing with SL ER at 0: 45 deg (7/19/22)  5. Patient will increase FOTO score to 64/100 for indications of increased functional mobility.   PN: FOTO 50 pts      PN due 8/11/22    PLAN  [x]  Upgrade activities as tolerated     [x]  Continue plan of care  []  Update interventions per flow sheet       []  Discharge due to:_  [x]  Other:_  Progress ROM to tolerance and strength as able     Isamar Hart, PT 7/26/2022  9:10 AM    Future Appointments   Date Time Provider Jami Parrish   7/26/2022 11:30 AM Francie Cochran, PT [facility]   7/28/2022 11:30 AM Francie Cochran, PT [facility]   8/2/2022 11:30 AM Francie Cochran, PT [facility]   8/4/2022 11:30 AM Francie Cochran, PT [facility]   8/9/2022 11:30 AM Francie Cochran, PT [facility]   8/11/2022 11:30 AM Francie Cochran, PT [facility]   8/16/2022 11:30 AM Francie Cochran, PT [facility]   8/18/2022 11:30 AM Francie Cochran, PT [facility]   8/23/2022 11:30 AM Francie Cochran, PT [facility]   8/25/2022 11:30 AM Francie Cochran, PT [facility]   8/30/2022 11:30 AM Francie Cochran, PT [facility]   9/1/2022 11:30 AM Francie Cochran, PT [facility]

## 2022-07-28 ENCOUNTER — HOSPITAL ENCOUNTER (OUTPATIENT)
Dept: PHYSICAL THERAPY | Age: 57
Discharge: HOME | End: 2022-07-28
Payer: COMMERCIAL

## 2022-07-28 PROCEDURE — 97110 THERAPEUTIC EXERCISES: CPT

## 2022-07-28 PROCEDURE — 97140 MANUAL THERAPY 1/> REGIONS: CPT

## 2022-07-28 NOTE — PROGRESS NOTES
PT DAILY TREATMENT NOTE     Patient Name: Terrence Meadows  Date:2022  : 1965  [x]  Patient  Verified  Payor: Duane Casey / Plan:  [facility] / Product Type: PPO /    In time:11:35 Out time:12:30  Total Treatment Time (min): 55  Visit #: 5 of 10    Medicare/BCBS Only   Total Timed Codes (min):55 1:1 Treatment Time: 55       Treatment Area: Right shoulder pain [M25.511]  Acute post-operative pain [G89.18]    SUBJECTIVE  Pain Level (0-10 scale):0   Any medication changes, allergies to medications, adverse drug reactions, diagnosis change, or new procedure performed?: [x] No    [] Yes (see summary sheet for update)  Subjective functional status/changes:   [] No changes reported  \"I'm tight today. Didn't do HEP yesterday and not anything before bed. I was just so beat\". OBJECTIVE    30 min Therapeutic Exercise:  [x] See flow sheet :  Standing IR/ER in anatomical position  -CARS for rotation   -Weight shifting side to side, front to back, circles 5x each      Rationale: increase ROM and increase strength to improve the patients ability to perform ADLs with improved shoulder/elbow strength and mobility. 27 min Manual Therapy:  R shoulder inferior glide grade 3-4, posterior glide grade 3-4, manual stretching into flexion/scaption/ER/IR with scap stabilization and GHJ distraction; pNF D1 and D2 both PROM and AROM with min/mod resistance of PT    NT: Rhythmic stabs at 90 deg flexion and ER at 0    The manual therapy interventions were performed at a separate and distinct time from the therapeutic activities interventions. Rationale: decrease pain, increase ROM, increase tissue extensibility, decrease trigger points and increase postural awareness to improve ease of ADLs in the home environment.           With   [] TE   [] TA   [] neuro   [] other: Patient Education: [x] Review HEP    [] Progressed/Changed HEP based on:   [] positioning   [] body mechanics   [] transfers   [] heat/ice application    [] other:     -discussed goals for R GHJ AROM as compared to the L as her normal ROM. -Progression of ROM  -avoid sharp pains with ROM but push to end range Firm end feel   -AROM  3 way cans with visual feedback for form  -avoid compensations with AROM  -AROM abduction with ER (palm forward)     Other Objective/Functional Measures:     Supine flexion AROM 133; sitting 105 without hike; AAROM 152 deg  AROM abduction: 88 before hike    PROM ER at 45: 56; at 90 : 55 deg (not updated today)  PROM IR at 45: NT  Scaption: AAROM 136 on pulleys; 140 (supine before MT)  SL ER AROM at 0 de deg   Extension: AAROM 44 deg (standing)  FIR AAROM: L5 with strap      Pain Level (0-10 scale) post treatment: 0    ASSESSMENT/Changes in Function: Pt still with firm end feel with ER/IR and abduction but overall continuing to improve daily in ROM. She demo's full awareness of avoiding a shoulder hike with ROM into elevation. Strength is increasing as evidenced by improved AROM in gravity-dependent ranges with good form. Able to tolerance min/mod resistance for PNF patterns. Patient will continue to benefit from skilled PT services to modify and progress therapeutic interventions, address functional mobility deficits, address ROM deficits, address strength deficits, analyze and address soft tissue restrictions, analyze and cue movement patterns, analyze and modify body mechanics/ergonomics, assess and modify postural abnormalities, address imbalance/dizziness and instruct in home and community integration to attain remaining goals. []  See Plan of Care  []  See progress note/recertification  []  See Discharge Summary         Progress towards goals / Updated goals:  1. Pt will be independent and compliant with updated HEP to progress to DC. PN: recently updated   Current: 3 way cans either in standing or supine (22)  2.   Pt will have an increase in AROM of the R GHJ to > or = 150 deg elevation with normal scapulohumeral rhythm to restore ADLs and driving. PN: AAROM 116; PROM 120 deg;  AROM 78 deg in sitting and 109 in supine   Current: standing 105 with no hike; supine flexion AAROM 147deg (7/26/22)  3. Pt will have > or = 4/5 MMT of R GHJ flexion, scaption, ER and IR to begin to restore ADLs, self-care, dressing. PN: 4-/5 grossly in right shoulder   Current: progressing with fatigue but no pain (7/26/22)  4. Patient will demo AROM ER to 60 deg per protocol for progression to decrease impingement risk. PN: PROM ER 40 deg   Current: Goal progressing with SL ER at 0: 45 deg (7/19/22)  5. Patient will increase FOTO score to 64/100 for indications of increased functional mobility.   PN: FOTO 50 pts      PN due 8/11/22    PLAN  [x]  Upgrade activities as tolerated     [x]  Continue plan of care  []  Update interventions per flow sheet       []  Discharge due to:_  [x]  Other:_  Progress ROM to tolerance and strength as able     Elie Boles PT 7/28/2022  9:10 AM    Future Appointments   Date Time Provider Jami Parrish   7/28/2022 11:30 AM Mar Balzarine, PT [facility]   8/2/2022 11:30 AM Mar Balzarine, PT [facility]   8/4/2022 11:30 AM Mar Balzarine, PT [facility]   8/9/2022 11:30 AM Mar Balzarine, PT [facility]   8/11/2022 11:30 AM Mar Balzarine, PT [facility]   8/16/2022 11:30 AM Mar Balzarine, PT [facility]   8/18/2022 11:30 AM Mar Balzarine, PT [facility]   8/23/2022 11:30 AM Mar Balzarine, PT [facility]   8/25/2022 11:30 AM Mar Balzarine, PT [facility]   8/30/2022 11:30 AM Mar Chahalzarine, PT [facility]   9/1/2022 11:30 AM Mar Tirsozarine, PT [facility]

## 2022-08-02 ENCOUNTER — HOSPITAL ENCOUNTER (OUTPATIENT)
Dept: PHYSICAL THERAPY | Age: 57
Discharge: HOME | End: 2022-08-02
Payer: COMMERCIAL

## 2022-08-02 PROCEDURE — 97110 THERAPEUTIC EXERCISES: CPT

## 2022-08-02 PROCEDURE — 97140 MANUAL THERAPY 1/> REGIONS: CPT

## 2022-08-02 NOTE — PROGRESS NOTES
PT DAILY TREATMENT NOTE     Patient Name: Rosario Lee  [id number]  : 1965  [x]  Patient  Verified  Payor: BLUE CROSS / Plan: [facility] / Product Type: PPO /    In time:11:32 Out time:12:29  Total Treatment Time (min): 62  Visit #: 6 of 10    Medicare/BCBS Only   Total Timed Codes (min):57 1:1 Treatment Time: 62       Treatment Area: Right shoulder pain [M25.511]  Acute post-operative pain [G89.18]    SUBJECTIVE  Pain Level (0-10 scale):0  Any medication changes, allergies to medications, adverse drug reactions, diagnosis change, or new procedure performed?: [x] No    [] Yes (see summary sheet for update)  Subjective functional status/changes:   [] No changes reported  Pt is distraught over family issues. Improvements: reaching up to close trunk of car! Repetitive tasks ; reaching up onto shelves      OBJECTIVE    39 min Therapeutic Exercise:  [x] See flow sheet :  Assessment while on UBE  -increased UBE to Level 2.5  -SL ER up to 2#  -added prone Y  -increased tband ER to Red      Rationale: increase ROM and increase strength to improve the patients ability to perform ADLs with improved shoulder/elbow strength and mobility. 18 min Manual Therapy:  R shoulder inferior glide grade 3-4, posterior glide grade 3-4, manual stretching into flexion/scaption/ER/IR with scap stabilization and GHJ distraction; pNF D1 and D2 both PROM and AROM with min/mod resistance of PT  Rhythmic stabs at 120 deg flexion   The manual therapy interventions were performed at a separate and distinct time from the therapeutic activities interventions. Rationale: decrease pain, increase ROM, increase tissue extensibility, decrease trigger points and increase postural awareness to improve ease of ADLs in the home environment.           With   [] TE   [] TA   [] neuro   [] other: Patient Education: [x] Review HEP    [] Progressed/Changed HEP based on:   [] positioning   [] body mechanics   [] transfers   [] heat/ice application    [] other:     Ok to initiate bicep and tricpe work at home; avoid c/o pain       Other Objective/Functional Measures:     Supine flexion AROM 154; sitting 110 without hike; AAROM 152 deg  AROM abduction: 105 before hike    Scaption: AROM 120 no hike; 140 (supine before MT)  PROM ER at 45: 60; at 90 : 55 deg  PROM IR at 45: 54    SL ER AROM at 0 de deg  (NT today)  Extension: AAROM 60 deg (standing)  FIR AAROM: L5 with strap  (NT today)    Pain Level (0-10 scale) post treatment: 0    ASSESSMENT/Changes in Function: pt gained AROM flexion, scaption and abd without a hike. Pt with gains in ROM in all directions both in PROm and AROM. Good tolerance to increased strengthening program as well. Difficulty with Prone Y AROM. Demo's good scapular stability with SA wall slides on the foam roll. Patient will continue to benefit from skilled PT services to modify and progress therapeutic interventions, address functional mobility deficits, address ROM deficits, address strength deficits, analyze and address soft tissue restrictions, analyze and cue movement patterns, analyze and modify body mechanics/ergonomics, assess and modify postural abnormalities, address imbalance/dizziness and instruct in home and community integration to attain remaining goals. []  See Plan of Care  []  See progress note/recertification  []  See Discharge Summary         Progress towards goals / Updated goals:  1. Pt will be independent and compliant with updated HEP to progress to DC. PN: recently updated   Current: con't to update as needed (22)  2. Pt will have an increase in AROM of the R GHJ to > or = 150 deg elevation with normal scapulohumeral rhythm to restore ADLs and driving. PN: AAROM 116; PROM 120 deg;  AROM 78 deg in sitting and 109 in supine   Current: standing 110 with no hike; supine flexion AAROM 154 deg (22)  3.  Pt will have > or = 4/5 MMT of R GHJ flexion, scaption, ER and IR to begin to restore ADLs, self-care, dressing. PN: 4-/5 grossly in right shoulder   Current: Progressed TE today with good response (8/2/22)  4. Patient will demo AROM ER to 60 deg per protocol for progression to decrease impingement risk. PN: PROM ER 40 deg   Current: Goal progressing PROM ER at 45: 60; at 90 : 55 deg (8/2/22)  5. Patient will increase FOTO score to 64/100 for indications of increased functional mobility.   PN: FOTO 50 pts    Current:  notes an increase in functional activity tolerance like closing her car trunk (8/2/22)    PN due 8/11/22    PLAN  [x]  Upgrade activities as tolerated     [x]  Continue plan of care  []  Update interventions per flow sheet       []  Discharge due to:_  [x]  Other:_  table incline 3 way cans for strength     Mike Wallace PT 8/2/2022  9:10 AM    Future Appointments   Date Time Provider Jami Parrish   8/2/2022 11:30 AM Glenny Montoya, PT [facility]   8/4/2022 11:30 AM Glenny Montoya, PT [facility]   8/9/2022 11:30 AM Glenny Montoya, PT [facility]   8/11/2022 11:30 AM Glenny Montoya, PT [facility]   8/16/2022 11:30 AM Glenny Montoya, PT [facility]   8/18/2022 11:30 AM Lialambert Montoya, PT [facility]   8/23/2022 11:30 AM Glenny Montoya, PT [facility]   8/25/2022 11:30 AM [facility], [facility]   8/30/2022 11:30 AM Glenny Montoya, PT [facility]   9/1/2022 11:30 AM Glenny Montoya, PT [facility]

## 2022-08-04 ENCOUNTER — HOSPITAL ENCOUNTER (OUTPATIENT)
Dept: PHYSICAL THERAPY | Age: 57
Discharge: HOME | End: 2022-08-04
Payer: COMMERCIAL

## 2022-08-04 PROCEDURE — 97110 THERAPEUTIC EXERCISES: CPT

## 2022-08-04 PROCEDURE — 97140 MANUAL THERAPY 1/> REGIONS: CPT

## 2022-08-04 NOTE — PROGRESS NOTES
PT DAILY TREATMENT NOTE     Patient Name: Kirsten Jauregui  HAON:[id number]  : 1965  [x]  Patient  Verified  Payor: BLUE CROSS / Plan: [employer] [facility] [place] / Product Type: PPO /    In time:11:32  Out time:12:15  Total Treatment Time (min): 43  Visit #: 7 of 10    Medicare/BCBS Only   Total Timed Codes (min):43 1:1 Treatment Time: 43       Treatment Area: Right shoulder pain [M25.511]  Acute post-operative pain [G89.18]    SUBJECTIVE  Pain Level (0-10 scale):  0  Any medication changes, allergies to medications, adverse drug reactions, diagnosis change, or new procedure performed?: [x] No    [] Yes (see summary sheet for update)  Subjective functional status/changes:   [] No changes reported  Shoulder Is just sore from normal movements in PT and the work we are doing. I dried my hair. Rolling onto R side: pt notes that she can roll onto R but It takes a moment to situate it first and then it becomes non-pain. It hurts again when she rolls off of it. OBJECTIVE    23 min Therapeutic Exercise:  [x] See flow sheet :  Progressed 3 way cans to table at 45 deg incline to be addison to progress strength   -supine cross over stretch      Rationale: increase ROM and increase strength to improve the patients ability to perform ADLs with improved shoulder/elbow strength and mobility. 20 min Manual Therapy:  R shoulder inferior glide grade 3-4, posterior glide grade 3-4, manual stretching into flexion/scaption/ER/IR with scap stabilization and GHJ distraction; pNF D1 and D2 both PROM and AROM with min/mod resistance of PT  Rhythmic stabs at 120 deg flexion   The manual therapy interventions were performed at a separate and distinct time from the therapeutic activities interventions. Rationale: decrease pain, increase ROM, increase tissue extensibility, decrease trigger points and increase postural awareness to improve ease of ADLs in the home environment.           With   [] TE   [] TA   [] neuro   [] other: Patient Education: [x] Review HEP    [] Progressed/Changed HEP based on:   [] positioning   [] body mechanics   [] transfers   [] heat/ice application    [] other:     Cross body/cross over stretch   Focus on ER mobility        Other Objective/Functional Measures:       Pain Level (0-10 scale) post treatment: 0    ASSESSMENT/Changes in Function: Pt was restricted iN ER today but did demo good improvements in IR at 45 deg abduction. Good response to strength progression with inclined table to increased 3 way cans. Patient will continue to benefit from skilled PT services to modify and progress therapeutic interventions, address functional mobility deficits, address ROM deficits, address strength deficits, analyze and address soft tissue restrictions, analyze and cue movement patterns, analyze and modify body mechanics/ergonomics, assess and modify postural abnormalities, address imbalance/dizziness and instruct in home and community integration to attain remaining goals. []  See Plan of Care  []  See progress note/recertification  []  See Discharge Summary         Progress towards goals / Updated goals:  1. Pt will be independent and compliant with updated HEP to progress to DC. PN: recently updated   Current: con't to update as needed (8/2/22)  2. Pt will have an increase in AROM of the R GHJ to > or = 150 deg elevation with normal scapulohumeral rhythm to restore ADLs and driving. PN: AAROM 116; PROM 120 deg;  AROM 78 deg in sitting and 109 in supine   Current: standing 110 with no hike; supine flexion AAROM 154 deg (8/2/22)  3. Pt will have > or = 4/5 MMT of R GHJ flexion, scaption, ER and IR to begin to restore ADLs, self-care, dressing. PN: 4-/5 grossly in right shoulder   Current: Progressed TE today with good response; table 3 way cans with 1# (8/4/22)  4. Patient will demo AROM ER to 60 deg per protocol for progression to decrease impingement risk.   PN: PROM ER 40 deg   Current: Goal progressing PROM ER at 45: 60; at 90 : 55 deg (8/2/22); min limited today (8/4/22)  5. Patient will increase FOTO score to 64/100 for indications of increased functional mobility.   PN: FOTO 50 pts    Current:  notes an increase in functional activity tolerance like closing her car trunk and doing her hair/blow dry (8/4/22)    PN due 8/11/22    PLAN  [x]  Upgrade activities as tolerated     [x]  Continue plan of care  []  Update interventions per flow sheet       []  Discharge due to:_  [x]  Other:_PN due next week     Oz Prior, PT 8/4/2022  9:10 AM    Future Appointments   Date Time Provider Jami Parrish   8/4/2022 11:30 AM Stephanie [place], PT [facility]   8/9/2022 11:30 AM Stephanie [place], PT [facility]   8/11/2022 11:30 AM Stephanie [place], PT [facility]   8/16/2022 11:30 AM Stephanie [place], PT [facility]   8/18/2022 11:30 AM Stephanie [place], PT [facility]   8/23/2022 11:30 AM Stephanie [place], PT [facility]   8/25/2022 11:30 AM Stephanie [place], PT [facility]   8/30/2022 11:30 AM Stephanie [place], PT [facility]   9/1/2022 11:30 AM Stephanie [place], PT [facility]   9/6/2022 11:30 AM Stephanie [place], PT [facility]   9/8/2022 11:30 AM Stephanie [place], PT [facility]   9/13/2022 11:30 AM Stephanie [place], PT [facility]   9/15/2022 11:30 AM Stephanie [place], PT [facility]   9/20/2022 11:30 AM Stephanie [place], PT [facility]   9/22/2022 11:30 AM Stephanie [place], PT [facility]   9/27/2022 11:30 AM Stephanie [place], PT [facility]   9/29/2022 11:30 AM Stephanie [place], PT [facility]

## 2022-08-09 ENCOUNTER — HOSPITAL ENCOUNTER (OUTPATIENT)
Dept: PHYSICAL THERAPY | Age: 57
Discharge: HOME | End: 2022-08-09
Payer: COMMERCIAL

## 2022-08-09 PROCEDURE — 97140 MANUAL THERAPY 1/> REGIONS: CPT

## 2022-08-09 PROCEDURE — 97110 THERAPEUTIC EXERCISES: CPT

## 2022-08-09 NOTE — PROGRESS NOTES
PT DAILY TREATMENT NOTE     Patient Name: Mae Almanzar  VATZ:[id number]  : 1965  [x]  Patient  Verified  Payor: BLUE CROSS / Plan: [employer] [facility] / Product Type: PPO /    In time:11:36  Out time:12:33  Total Treatment Time (min): 57  Visit #: 8 of 10    Medicare/BCBS Only   Total Timed Codes (min):57 1:1 Treatment Time: 57       Treatment Area: Right shoulder pain [M25.511]  Acute post-operative pain [G89.18]    SUBJECTIVE  Pain Level (0-10 scale):  0  Any medication changes, allergies to medications, adverse drug reactions, diagnosis change, or new procedure performed?: [x] No    [] Yes (see summary sheet for update)  Subjective functional status/changes:   [] No changes reported  \"I woke up this morning and arm was really hurting. I started to do bicep/tricep work but that feels ok and I think I slept wrong\". OBJECTIVE    40 min Therapeutic Exercise:  [x] See flow sheet :  Progressed 3 way cans to table at 45 deg incline to be addison to progress strength   -supine cross over stretch      Rationale: increase ROM and increase strength to improve the patients ability to perform ADLs with improved shoulder/elbow strength and mobility. 17 min Manual Therapy:  R shoulder inferior glide grade 3-4, posterior glide grade 3-4, manual stretching into flexion/scaption/ER/IR with scap stabilization and GHJ distraction; pNF D1 and D2 both PROM and AROM with min/mod resistance of PT  TrP release to anterior fibers of R UT    NV: Rhythmic stabs at 120 deg flexion   The manual therapy interventions were performed at a separate and distinct time from the therapeutic activities interventions. Rationale: decrease pain, increase ROM, increase tissue extensibility, decrease trigger points and increase postural awareness to improve ease of ADLs in the home environment.           With   [x] TE   [] TA   [] neuro   [] other: Patient Education: [x] Review HEP    [] Progressed/Changed HEP based on:   [x] positioning [x] body mechanics   [] transfers   [] heat/ice application    [] other:     Cross body/cross over stretch   Focus on abduction mobility  Pec stretch at the doorway; TrP release to pec with tennis ball        Other Objective/Functional Measures:   Subjective Gains: improved ability to use right arm for eating, dressing, driving, heavy tasks (open doors, close car trunk); improved shoulder strength (lifting, etc); more natural right shoulder movement; able to sleep a little bit on right side, pain improvements; improved function even though lacking full FIR  Subjective Deficits: unable to reach to back of head, unable to reach behind back   % improvement: 70%  Pain   Average: 0/10                     Best: 0/10                   Worst: 0/10  Patient Goal: \"to get to 100%\"    Objective Measures:   Shoulder AROM/PROM:                                           AROM (deg)              PROM (deg)    Right Left Right   Flexion (seated/supine) 132   159   Extension 65 deg    34   Scaption 134    145    Abduction 120; 148 in SL  124   ER at 0 Deg ABD 58   @45: 60 deg; @ 90: 56 deg    IR at 45 Deg ABD     62    FIR L3  ----     Strength:    Right (/5) Left (/5)   GHJ   Flexion 4 5             Abduction 4- 5             Extension 5 5             ER 4 5             IR 4 5             Serratus ant. 3 4+   Bicep/Tricep: R 4+/5, L 5/5   Wrist: WNL  : WNL    Pain Level (0-10 scale) post treatment: 0    ASSESSMENT/Changes in Function: see PN     Patient will continue to benefit from skilled PT services to modify and progress therapeutic interventions, address functional mobility deficits, address ROM deficits, address strength deficits, analyze and address soft tissue restrictions, analyze and cue movement patterns, analyze and modify body mechanics/ergonomics, assess and modify postural abnormalities, address imbalance/dizziness and instruct in home and community integration to attain remaining goals.      []  See Plan of Care  [x]  See progress note/recertification  []  See Discharge Summary         Progress towards goals / Updated goals:  1. Pt will be independent and compliant with updated HEP to progress to DC. PN: recently updated   Current: review and update verbally each session (8/9/22)  2. Pt will have an increase in AROM of the R GHJ to > or = 150 deg elevation with normal scapulohumeral rhythm to restore ADLs and driving. PN: AAROM 116; PROM 120 deg;  AROM 78 deg in sitting and 109 in supine   Current: AROM flexion 132, abduction 120 (8/9/22)  3. Pt will have > or = 4/5 MMT of R GHJ flexion, scaption, ER and IR to begin to restore ADLs, self-care, dressing. PN: 4-/5 grossly in right shoulder   Current: Progressing; Flexion/ ER 4/5, extension 5/5, abduction 4-/5, SA 3/5 (8/9/22)  4. Patient will demo AROM ER to 60 deg per protocol for progression to decrease impingement risk. PN: PROM ER 40 deg   Current: Goal progressing: AROM Er at 45: 58 deg, PROM ER at 45: 60; at 90 : 62 deg (8/9/22)  5. Patient will increase FOTO score to 64/100 for indications of increased functional mobility.   PN: FOTO 50 pts    Current:  notes an increase in functional activity tolerance like closing her car trunk and doing her hair/blow dry (8/4/22)    PN due 8/11/22    PLAN  [x]  Upgrade activities as tolerated     [x]  Continue plan of care  []  Update interventions per flow sheet       []  Discharge due to:_  [x]  Other: con't with PT 1-2x/week for 4-8 sessions     Nita Otero PT 8/9/2022  9:10 AM    Future Appointments   Date Time Provider Jami Parrish   8/9/2022 11:30 AM Erin Lin PT [facility]   8/11/2022 11:30 AM Erin Lin PT [facility]   8/16/2022 11:30 AM Erin Lin PT [facility]   8/18/2022 11:30 AM Idelia Orris, PT [facility]   8/23/2022 11:30 AM Idelia Orris, PT [facility]   8/25/2022 11:30 AM Idelia Orris, PT [facility]   8/30/2022 11:30 AM Idelia Orris, PT [facility]   9/1/2022 11:30 AM Moni Kennedy, PT [facility]   9/6/2022 11:30 AM Moni Kennedy, PT [facility]   9/8/2022 11:30 AM Moni Kennedy, PT [facility]   9/13/2022 11:30 AM Moni Kennedy, PT [facility]   9/15/2022 11:30 AM Moni Kennedy, PT [facility]   9/20/2022 11:30 AM Moni Kennedy, PT [facility]   9/22/2022 11:30 AM Moni Kennedy, PT [facility]   9/27/2022 11:30 AM Moni Kennedy, PT [facility]   9/29/2022 11:30 AM Moni Kennedy, PT [facility]

## 2022-08-09 NOTE — PROGRESS NOTES
[facility] AT [address] [facility], [place] [address]  Phone: [phone] Fax: [phone]  PROGRESS NOTE  Patient Name: Kenyatta Camilo : 1965   Treatment/Medical Diagnosis: Right shoulder pain [M25.511]  Acute post-operative pain [G89.18]   Referral Source: Ana Loera*     Date of Initial Visit: 22 Attended Visits: 28 Missed Visits: 0     SUMMARY OF TREATMENT  Pt is a 61y.o. year old female who presents with  s/p R shoulder large RTC repair and biceps tenodesis DOS 2022. Ther ex including full R UE AROM, PROM and strengthening; stability and NMRed work for scapular control; DTM, joint mobs and PROM; PNF patterns for functional movements, CKC for shoulder stability; Patient education; HEP, cryotherapy for edema and pain management. CURRENT STATUS  Patient has attended PT for 28 sessions for the treatment of right large rotator cuff repair with associated biceps tenodesis and SAD. She is 16 weeks post-operative. Sameer Joseph demonstrates an improvement in function since the cortisone injection several weeks ago, which helped to alleviate her pain and improve her tolerance to AROM and PROM. She has significant functional improvements and overall demo's good movement patterns of the scapulohumeral girdle in her available ranges. She still is progressing slowly with ER ROM (and therefore end range abduction), however she does con't to improve overall (has progressed slowly since beginning rehab due to pain management issues and fear avoidance).      Subjective Gains: improved ability to use right arm for eating, dressing, driving, heavy tasks (open doors, close car trunk); improved shoulder strength (lifting, etc); more natural right shoulder movement; able to sleep a little bit on right side, pain improvements; improved function even though lacking full FIR  Subjective Deficits: unable to reach to back of head, unable to reach behind back  % improvement: 70%  Pain   Average: 0/10                     Best: 0/10                   Worst: 0/10  Patient Goal: \"to get to 100%\"     Objective Measures:   Shoulder AROM/PROM:                                           AROM (deg)              PROM (deg)    Right Left Right   Flexion (seated/supine) 132   159   Extension 65 deg    34   Scaption 134    145    Abduction 120; 148 in SL   124   ER at 0 Deg ABD 58   @45: 60 deg; @ 90: 56 deg    IR at 45 Deg ABD     62    FIR L3   ----      Strength:    Right (/5) Left (/5)   GHJ   Flexion 4 5             Abduction 4- 5             Extension 5 5             ER 4 5             IR 4 5             Serratus ant. 3 4+   Bicep/Tricep: R 4+/5, L 5/5  Wrist: WNL  : WNL       Current Goals:  1. Pt will be independent and compliant with updated HEP to progress to DC. PN: recently updated   Current: review and update verbally each session (8/9/22)  2. Pt will have an increase in AROM of the R GHJ to > or = 150 deg elevation with normal scapulohumeral rhythm to restore ADLs and driving. PN: AAROM 116; PROM 120 deg;  AROM 78 deg in sitting and 109 in supine   Current: AROM flexion 132, abduction 120 (8/9/22)  3. Pt will have > or = 4/5 MMT of R GHJ flexion, scaption, ER and IR to begin to restore ADLs, self-care, dressing. PN: 4-/5 grossly in right shoulder  Current: Progressing; Flexion/ ER 4/5, extension 5/5, abduction 4-/5, SA 3/5 (8/9/22)  4. Patient will demo AROM ER to 60 deg per protocol for progression to decrease impingement risk. PN: PROM ER 40 deg   Current: Goal progressing: AROM Er at 45: 58 deg, PROM ER at 45: 60; at 90 : 62 deg (8/9/22)  5. Patient will increase FOTO score to 64/100 for indications of increased functional mobility. PN: FOTO 50 pts    Current:  notes an increase in functional activity tolerance like closing her car trunk and doing her hair/blow dry (8/4/22)    New Goals to be achieved in __8__  treatments:  1.  Pt will be independent and compliant with updated HEP to progress to DC. PN: review and update verbally each session   2. Pt will have an increase in AROM of the R GHJ to > or = 150 deg elevation with normal scapulohumeral rhythm to restore ADLs and driving. PN: AROM flexion 132, abduction 120   3. Pt will have > or = 4/5 MMT of R GHJ flexion, scaption, ER and IR to begin to restore ADLs, self-care, dressing. PN: Progressing; Flexion/ER 4/5, extension 5/5, abduction 4-/5, SA 3/5   4. Patient will demo AROM ER to 60 deg per protocol for progression to decrease impingement risk. PN: AROM Er at 45: 58 deg, PROM ER at 45: 60; at 90 : 62 deg   5. Patient will increase FOTO score to 64/100 for indications of increased functional mobility. PN: FOTO 50 pts      RECOMMENDATIONS  Pt to continue with skilled therapy for 1-2x/week for 8 sessions to improve shoulder mobility/strength to improve ease with overhead lifting, reaching, and donning/doffing clothes. If you have any questions/comments please contact us directly at [phone]   Thank you for allowing us to assist in the care of your patient. Therapist Signature: Stephanie Mac, PT Date: 8/9/2022   Reporting Period  7/12/22 to 8/9/22  Time: 11:30am    NOTE TO PHYSICIAN:  PLEASE COMPLETE THE ORDERS BELOW AND FAX TO   [facility]: [phone]. If you are unable to process this request in 24 hours please contact our office: [phone].  ___ I have read the above report and request that my patient continue as recommended.   ___ I have read the above report and request that my patient continue therapy with the following changes/special instructions:_________________________________________________________   ___ I have read the above report and request that my patient be discharged from therapy. Physician Signature:        Date:       Time:                                                        Andrey Ruano.

## 2022-08-11 ENCOUNTER — APPOINTMENT (OUTPATIENT)
Dept: PHYSICAL THERAPY | Age: 57
End: 2022-08-11
Payer: COMMERCIAL

## 2022-08-16 ENCOUNTER — HOSPITAL ENCOUNTER (OUTPATIENT)
Dept: PHYSICAL THERAPY | Age: 57
Discharge: HOME | End: 2022-08-16
Payer: COMMERCIAL

## 2022-08-16 PROCEDURE — 97110 THERAPEUTIC EXERCISES: CPT

## 2022-08-16 PROCEDURE — 97140 MANUAL THERAPY 1/> REGIONS: CPT

## 2022-08-16 NOTE — PROGRESS NOTES
PT DAILY TREATMENT NOTE     Patient Name: Kenyatta Camilo  CFJC:[id number]  : 1965  [x]  Patient  Verified  Payor: BLUE CROSS / Plan: [employer] [facility] / Product Type: PPO /    In time:11:31 Out time:12:19  Total Treatment Time (min): 48  Visit #: 1 of -    Medicare/BCBS Only   Total Timed Codes (min): 48 1:1 Treatment Time: 48       Treatment Area: Right shoulder pain [M25.511]  Acute post-operative pain [G89.18]    SUBJECTIVE  Pain Level (0-10 scale):  0  Any medication changes, allergies to medications, adverse drug reactions, diagnosis change, or new procedure performed?: [x] No    [] Yes (see summary sheet for update)  Subjective functional status/changes:   [] No changes reported  \"Feeling more range like reaching OH. Doing well. Sleeping better on the R side. Doing tasks  - like crossing arms in front - feels more natural now and less like I have to think about thing. My arm feels natural in the socket now when I drive\". OBJECTIVE    35 min Therapeutic Exercise:  [x] See flow sheet :    UBE: level 4; tabata 10\" fast, 20\" slow.   -added PNF patterns with yellow tband in standing; D1 and D2 10 reps each  -added ball on wall ABC   -3 way cans done in standing today due to increased strength without hike  -wall push ups progressed to on elevated table, wide and regular 10x each       Rationale: increase ROM and increase strength to improve the patients ability to perform ADLs with improved shoulder/elbow strength and mobility. 13 min Manual Therapy:  R shoulder inferior glide grade 3-4, posterior glide grade 3-4, manual stretching into flexion/scaption/ER/IR with scap stabilization and GHJ distraction; pNF D1 and D2 both PROM and AROM with mod resistance of PT   The manual therapy interventions were performed at a separate and distinct time from the therapeutic activities interventions.   Rationale: decrease pain, increase ROM, increase tissue extensibility, decrease trigger points and increase postural awareness to improve ease of ADLs in the home environment. With   [x] TE   [] TA   [] neuro   [] other: Patient Education: [x] Review HEP    [] Progressed/Changed HEP based on:   [x] positioning   [x] body mechanics   [] transfers   [] heat/ice application    [] other:     Cross body/cross over stretch   Focus on abduction mobility  Pec stretch at the doorway; TrP release to pec with tennis ball        Other Objective/Functional Measures:   AROM R GHJ: flexion 132  Abduction: 125    Pain Level (0-10 scale) post treatment: 0    ASSESSMENT/Changes in Function: great progress towards goals with AROM gains and good response to increase in strength. Limited joint mobility inferior responding well to more aggressive mobs. Pt very aware and able to self-correct to avoid R shoulder hike with AROM. Patient will continue to benefit from skilled PT services to modify and progress therapeutic interventions, address functional mobility deficits, address ROM deficits, address strength deficits, analyze and address soft tissue restrictions, analyze and cue movement patterns, analyze and modify body mechanics/ergonomics, assess and modify postural abnormalities, address imbalance/dizziness and instruct in home and community integration to attain remaining goals. []  See Plan of Care  [x]  See progress note/recertification  []  See Discharge Summary         Progress towards goals / Updated goals:  1. Pt will be independent and compliant with updated HEP to progress to DC. PN: review and update verbally each session   2. Pt will have an increase in AROM of the R GHJ to > or = 150 deg elevation with normal scapulohumeral rhythm to restore ADLs and driving. PN: AROM flexion 132, abduction 120   Current: remains the same (but measured at end of session when fatigued) (8/16/22)  3. Pt will have > or = 4/5 MMT of R GHJ flexion, scaption, ER and IR to begin to restore ADLs, self-care, dressing.   PN: Progressing; Flexion/ER 4/5, extension 5/5, abduction 4-/5, SA 3/5  Current; progressing program with great response (8/16/22)  4. Patient will demo AROM ER to 60 deg per protocol for progression to decrease impingement risk. PN: AROM Er at 45: 58 deg, PROM ER at 45: 60; at 90 : 62 deg  5. Patient will increase FOTO score to 64/100 for indications of increased functional mobility. PN: FOTO 50 pts       PN due 9/9/22    PLAN  [x]  Upgrade activities as tolerated     [x]  Continue plan of care  []  Update interventions per flow sheet       []  Discharge due to:_  [x]  Other: progress full ROM and strength  MD follow-up on Friday.  Please change PN if needed      Sincere Hamilton, CHARANJIT 8/16/2022  9:10 AM    Future Appointments   Date Time Provider Jami Parrish   8/16/2022 11:30 AM Addis Backers, PT [facility]   8/18/2022 11:30 AM Addis Backers, PT [facility]   8/23/2022 11:30 AM Addis Backers, PT [facility]   8/25/2022 11:30 AM Addis Backers, PT [facility]   8/30/2022 11:30 AM Addis Backers, PT [facility]   9/1/2022 11:30 AM Addis Backers, PT [facility]   9/6/2022 11:30 AM Addis Backers, PT [facility]   9/8/2022 11:30 AM Addis Backers, PT [facility]   9/13/2022 11:30 AM Addis Backers, PT [facility]   9/15/2022 11:30 AM Addis Backers, PT [facility]   9/20/2022 11:30 AM Addis Backers, PT [facility]   9/22/2022 11:30 AM Addis Backers, PT [facility]   9/27/2022 11:30 AM Addis Backers, PT [facility]   9/29/2022 11:30 AM Addis Backers, PT [facility]

## 2022-08-18 ENCOUNTER — HOSPITAL ENCOUNTER (OUTPATIENT)
Dept: PHYSICAL THERAPY | Age: 57
Discharge: HOME | End: 2022-08-18
Payer: COMMERCIAL

## 2022-08-18 PROCEDURE — 97110 THERAPEUTIC EXERCISES: CPT

## 2022-08-18 PROCEDURE — 97140 MANUAL THERAPY 1/> REGIONS: CPT

## 2022-08-18 NOTE — PROGRESS NOTES
[facility] AT [address] [facility], [place] [address]  Phone: [phone] Fax: [phone]  PROGRESS NOTE  Patient Name: Rosario Lee : 1965   Treatment/Medical Diagnosis: Right shoulder pain [M25.511]  Acute post-operative pain [G89.18]   Referral Source: Lanre Ast*     Date of Initial Visit: 22 Attended Visits: 30 Missed Visits: 0     SUMMARY OF TREATMENT  Pt is a 61y.o. year old female who presents with  s/p R shoulder large RTC repair and biceps tenodesis DOS 2022. Ther ex including full R UE AROM, PROM and strengthening; stability and NMRed work for scapular control; DTM, joint mobs and PROM; PNF patterns for functional movements, CKC for shoulder stability; Patient education; HEP, cryotherapy for edema and pain management. CURRENT STATUS    Addendum for MD visit tomorrow to include updated ROM       AROM (deg)              PROM (deg)    Right Left Right   Flexion (seated/supine) 132 (150 sit)   159   Extension 65 deg       Scaption 134    145    Abduction 129; 148 in SL   134   ER at 0 Deg ABD 58   @45: 60 deg; @ 90: 65 deg    IR at 45 Deg ABD     71   FIR L2 T4  T11      RECOMMENDATIONS  Pt to continue with skilled therapy with a decrease in frequency to 1x/week (unless advised by MD/PA team) for 4-8 sessions to improve shoulder mobility/strength to improve ease with overhead lifting, reaching, and donning/doffing clothes. If you have any questions/comments please contact us directly at [phone]   Thank you for allowing us to assist in the care of your patient. Therapist Signature: Elie Boles PT Date: 2022   Reporting Period  22 to 22  Time: 11:30am    NOTE TO PHYSICIAN:  PLEASE COMPLETE THE ORDERS BELOW AND FAX TO   [facility]: [phone].   If you are unable to process this request in 24 hours please contact our office: [phone].  ___ I have read the above report and request that my patient continue as recommended.   ___ I have read the above report and request that my patient continue therapy with the following changes/special instructions:_________________________________________________________   ___ I have read the above report and request that my patient be discharged from therapy. Physician Signature:        Date:       Time:                                                        Rayna Elliott*.

## 2022-08-18 NOTE — PROGRESS NOTES
PT DAILY TREATMENT NOTE     Patient Name: Khalif Pandey  JSZI:[id number]  : 1965  [x]  Patient  Verified  Payor: BLUE CROSS / Plan: [employer] [facility] / Product Type: PPO /    In time:11:30  Out time: 12:22  Total Treatment Time (min): 52  Visit #: 2 of 4-8    Medicare/BCBS Only   Total Timed Codes (min): 52 1:1 Treatment Time: 52       Treatment Area: Right shoulder pain [M25.511]  Acute post-operative pain [G89.18]    SUBJECTIVE  Pain Level (0-10 scale):  0  Any medication changes, allergies to medications, adverse drug reactions, diagnosis change, or new procedure performed?: [x] No    [] Yes (see summary sheet for update)  Subjective functional status/changes:   [] No changes reported  \"No pain, just mm soreness (points to pecs)\". OBJECTIVE    34 min Therapeutic Exercise:  [x] See flow sheet :    UBE: level 4; tabata 10\" fast, 20\" slow.   -added PNF patterns with yellow tband in standing; D1 and D2 10 reps each  -wall slides: flexion, scaption and ER at 90/90 stretch done at wall, 10 x each   -Strap IR 10x10\"   -ball on wall ABC - VC for arm straight on wall   -3 way cans done in standing today with mirror feedback   -increasedTband IR to double black     Rationale: increase ROM and increase strength to improve the patients ability to perform ADLs with improved shoulder/elbow strength and mobility. 18 min Manual Therapy:  R shoulder inferior glide grade 4, posterior glide grade 3-4, manual stretching into flexion/scaption/ER/IR with scap stabilization and GHJ distraction; TrP release to posterior cuff and lat   The manual therapy interventions were performed at a separate and distinct time from the therapeutic activities interventions. Rationale: decrease pain, increase ROM, increase tissue extensibility, decrease trigger points and increase postural awareness to improve ease of ADLs in the home environment.           With   [x] TE   [] TA   [] neuro   [] other: Patient Education: [x] Review HEP    [] Progressed/Changed HEP based on:   [x] positioning   [x] body mechanics   [] transfers   [] heat/ice application    [] other:     Cross body/cross over stretch   Focus on abduction mobility and ER  Pec stretch at the doorway  TrP release to pec with tennis ball   -can add yoga poses back into routine     Other Objective/Functional Measures:     Shoulder AROM/PROM:                                           AROM (deg)              PROM (deg)    Right Left Right   Flexion (seated/supine) 132 (150 sup)   159   Extension 65 deg       Scaption 134    145    Abduction 129; 148 in SL   134   ER at 0 Deg ABD 58   @45: 60 deg; @ 90: 65 deg    IR at 45 Deg ABD     71   FIR L2 T4  T11       Pain Level (0-10 scale) post treatment: 0    ASSESSMENT/Changes in Function: see recent PN to MD updated today with ROM measures. Patient will continue to benefit from skilled PT services to modify and progress therapeutic interventions, address functional mobility deficits, address ROM deficits, address strength deficits, analyze and address soft tissue restrictions, analyze and cue movement patterns, analyze and modify body mechanics/ergonomics, assess and modify postural abnormalities, address imbalance/dizziness and instruct in home and community integration to attain remaining goals. []  See Plan of Care  [x]  See progress note/recertification  []  See Discharge Summary         Progress towards goals / Updated goals:  1. Pt will be independent and compliant with updated HEP to progress to DC. PN: review and update verbally each session   2. Pt will have an increase in AROM of the R GHJ to > or = 150 deg elevation with normal scapulohumeral rhythm to restore ADLs and driving. PN: AROM flexion 132 sitting  abduction 120  Current: AROM R flexion sitting 132, supine 150; abduction 129 (8/18/22)  3.  Pt will have > or = 4/5 MMT of R GHJ flexion, scaption, ER and IR to begin to restore ADLs, self-care, dressing. PN: Progressing; Flexion/ER 4/5, extension 5/5, abduction 4-/5, SA 3/5  Current; progressing program with great response and appropriate DOMS (8/18/22)  4. Patient will demo AROM ER to 60 deg per protocol for progression to decrease impingement risk. PN: AROM Er at 45: 58 deg, PROM ER at 45: 60; at 90 : 62 deg  Current: progressing; @45: 60 deg; @ 90: 65 deg  (8/18/22)  5. Patient will increase FOTO score to 64/100 for indications of increased functional mobility. PN: FOTO 50 pts       PN due 9/9/22    PLAN  [x]  Upgrade activities as tolerated     [x]  Continue plan of care  []  Update interventions per flow sheet       []  Discharge due to:_  [x]  Other: progress full ROM (elevation and ER) and strength  MD follow-up on Friday.     Alice Hodge, CHARANJIT 8/18/2022  9:10 AM    Future Appointments   Date Time Provider Jami Parrish   8/18/2022 11:30 AM Alba Rosalino, PT [facility]   8/30/2022 11:30 AM Alba Rosalino, PT [facility]   9/1/2022 11:30 AM Alba Rosalino, PT [facility]   9/6/2022 11:30 AM Alba Rosalino, PT [facility]   9/8/2022 11:30 AM Alba Rosalino, PT [facility]   9/13/2022 11:30 AM Alba Rosalino, PT [facility]   9/15/2022 11:30 AM Alba Rosalino, PT [facility]   9/20/2022 11:30 AM Alba Rosalino, PT [facility]   9/22/2022 11:30 AM Alba Rosalino, PT [facility]   9/27/2022 11:30 AM Stephanie Jerry, PT [facility]   9/29/2022 11:30 AM Stephanie Jerry PT [facility]

## 2022-08-23 ENCOUNTER — APPOINTMENT (OUTPATIENT)
Dept: PHYSICAL THERAPY | Age: 57
End: 2022-08-23
Payer: COMMERCIAL

## 2022-08-25 ENCOUNTER — APPOINTMENT (OUTPATIENT)
Dept: PHYSICAL THERAPY | Age: 57
End: 2022-08-25
Payer: COMMERCIAL

## 2022-08-30 ENCOUNTER — HOSPITAL ENCOUNTER (OUTPATIENT)
Dept: PHYSICAL THERAPY | Age: 57
Discharge: HOME | End: 2022-08-30
Payer: COMMERCIAL

## 2022-08-30 PROCEDURE — 97140 MANUAL THERAPY 1/> REGIONS: CPT

## 2022-08-30 PROCEDURE — 97110 THERAPEUTIC EXERCISES: CPT

## 2022-08-30 NOTE — PROGRESS NOTES
PT DAILY TREATMENT NOTE     Patient Name: Ry Leach  Date:2022  : 1965  [x]  Patient  Verified  Payor: BLUE CROSS / Plan: [employer] [facility] [place] / Product Type: PPO /    In time:11:30 Out time: 12:15  Total Treatment Time (min): 45  Visit #: 1 of -    Medicare/BCBS Only   Total Timed Codes (min): 545 1:1 Treatment Time: 45       Treatment Area: Right shoulder pain [M25.511]  Acute post-operative pain [G89.18]    SUBJECTIVE  Pain Level (0-10 scale):  0  Any medication changes, allergies to medications, adverse drug reactions, diagnosis change, or new procedure performed?: [x] No    [] Yes (see summary sheet for update)  Subjective functional status/changes:   [] No changes reported  \"I've been in [address] [facility] [place] for a week and done NOTHING. I'm stiff and discouraged. My PA was very happy and encouraged with my progress and encouraged con't 2x/week. OBJECTIVE    26 min Therapeutic Exercise:  [x] See flow sheet :    UBE: level 4 2 min forwards and level 2, 2 min retro (reassess while on UBE)  -PNF no tband today ; performed in supine to resume ROM focus   - cane extension and H adduction; strap IR 10x10\"   -ball on wall ABC - VC for arm straight on wall   -3 way cans done in standing today with mirror feedback   -SA wall slides changed to wall V with lift off.   -wall slide scaption with stretch (single arm high doorway stretch)    Rationale: increase ROM and increase strength to improve the patients ability to perform ADLs with improved shoulder/elbow strength and mobility. 19 min Manual Therapy:  L SL scapular glides to start; R GHJ distraction; R shoulder inferior glide grade 4, posterior glide grade 3-4, manual stretching into flexion/scaption/ER/IR with scap stabilization and GHJ distraction;  TrP release to posterior cuff and lat; PNF D2 flexion/extension   The manual therapy interventions were performed at a separate and distinct time from the therapeutic activities interventions. Rationale: decrease pain, increase ROM, increase tissue extensibility, decrease trigger points and increase postural awareness to improve ease of ADLs in the home environment. With   [x] TE   [] TA   [] neuro   [] other: Patient Education: [x] Review HEP    [] Progressed/Changed HEP based on:   [x] positioning   [x] body mechanics   [] transfers   [] heat/ice application    [] other:     Importance of ER ROM for HEP this week      Other Objective/Functional Measures:   Pt is 19 weeks post-op    Supine AAROM flexion: 162 deg      Pain Level (0-10 scale) post treatment:0    ASSESSMENT/Changes in Function: stiffness in the R GHJ noted, and difficult with dissociation of the scapula and humerus during abduction PROM. Pt has gained 3 deg of AAROm/PROM flexion up to 162, however. Min hike con't from 60 deg to 90 deg flexion. Patient will continue to benefit from skilled PT services to modify and progress therapeutic interventions, address functional mobility deficits, address ROM deficits, address strength deficits, analyze and address soft tissue restrictions, analyze and cue movement patterns, analyze and modify body mechanics/ergonomics, assess and modify postural abnormalities, address imbalance/dizziness and instruct in home and community integration to attain remaining goals. []  See Plan of Care  [x]  See progress note/recertification  []  See Discharge Summary         Progress towards goals / Updated goals:  1. Pt will be independent and compliant with updated HEP to progress to DC. PN: review and update verbally each session  Current: review importance of ER ROM (8/30/22)   2. Pt will have an increase in AROM of the R GHJ to > or = 150 deg elevation with normal scapulohumeral rhythm to restore ADLs and driving. PN: AROM flexion 132 sitting  abduction 120  Current: supine AAROM flexion 162 deg  (8/30/22)  3.  Pt will have > or = 4/5 MMT of R GHJ flexion, scaption, ER and IR to begin to restore ADLs, self-care, dressing. PN: Progressing; Flexion/ER 4/5, extension 5/5, abduction 4-/5, SA 3/5  Current; progressing program with great response and appropriate DOMS (8/18/22)  4. Patient will demo AROM ER to 60 deg per protocol for progression to decrease impingement risk. PN: AROM Er at 45: 58 deg, PROM ER at 45: 60; at 90 : 62 deg  Current: progressing; @45: 60 deg; @ 90: 65 deg  (8/18/22)  5. Patient will increase FOTO score to 64/100 for indications of increased functional mobility. PN: FOTO 50 pts       PN due 9/9/22    PLAN  [x]  Upgrade activities as tolerated     [x]  Continue plan of care  []  Update interventions per flow sheet       []  Discharge due to:_  [x]  Other: pt to con't at 2x/week per PA recommendation. PT in agreement.      Sincere Hamilton, PT 8/30/2022  9:10 AM    Future Appointments   Date Time Provider Jami Parrish   8/30/2022 11:30 AM Addis Backers, PT [facility]   9/1/2022 11:30 AM Addis Backers, PT [facility]   9/6/2022 11:30 AM Addis Backers, PT [facility]   9/8/2022 11:30 AM Addis Backers, PT [facility]   9/13/2022 11:30 AM Addis Backers, PT [facility]   9/15/2022 11:30 AM Addis Backers, PT [facility]   9/20/2022 11:30 AM Addis Backers, PT [facility]   9/22/2022 11:30 AM Addis Backers, PT [facility]   9/27/2022 11:30 AM Addis Backers, PT [facility]   9/29/2022 11:30 AM Addis Dela Cruz, PT [facility]

## 2022-09-01 ENCOUNTER — HOSPITAL ENCOUNTER (OUTPATIENT)
Dept: PHYSICAL THERAPY | Age: 57
Discharge: HOME | End: 2022-09-01
Payer: COMMERCIAL

## 2022-09-01 PROCEDURE — 97110 THERAPEUTIC EXERCISES: CPT

## 2022-09-01 PROCEDURE — 97140 MANUAL THERAPY 1/> REGIONS: CPT

## 2022-09-01 NOTE — PROGRESS NOTES
PT DAILY TREATMENT NOTE     Patient Name: Rosario Lee  Date:2022  : 1965  [x]  Patient  Verified  Payor: BLUE CROSS / Plan: [facility] [place] / Product Type: PPO /    In time:11:42      Out time: 12:25  Total Treatment Time (min): 43  Visit #: 2 of 4-8    Medicare/BCBS Only   Total Timed Codes (min): 43 1:1 Treatment Time: 43       Treatment Area: Right shoulder pain [M25.511]  Acute post-operative pain [G89.18]    SUBJECTIVE  Pain Level (0-10 scale):  0  Any medication changes, allergies to medications, adverse drug reactions, diagnosis change, or new procedure performed?: [x] No    [] Yes (see summary sheet for update)  Subjective functional status/changes:   [] No changes reported  Pt was 12 min late for treatment. Her dog got loose and attacked another dog and she had to handle the situation. OBJECTIVE    29 min Therapeutic Exercise:  [x] See flow sheet :    UBE  - held today, time constraints   -PNF: Performed tband \"X\"  -ball on wall ABC - 120 deg scaption   -3 way cans done in standing today with no mirror feedback ; 1#  -wall V with lift off 10x5\"  -added wall box with YTB    Rationale: increase ROM and increase strength to improve the patients ability to perform ADLs with improved shoulder/elbow strength and mobility. 14 min Manual Therapy:  R GHJ distraction; R shoulder inferior glide grade 4, posterior glide grade 3-4, manual stretching into flexion/scaption/ER/IR with scap stabilization and GHJ distraction; The manual therapy interventions were performed at a separate and distinct time from the therapeutic activities interventions. Rationale: decrease pain, increase ROM, increase tissue extensibility, decrease trigger points and increase postural awareness to improve ease of ADLs in the home environment.           With   [x] TE   [] TA   [] neuro   [] other: Patient Education: [x] Review HEP    [] Progressed/Changed HEP based on:   [x] positioning   [x] body mechanics   [] transfers   [] heat/ice application    [] other:     Importance of ER ROM for HEP this week      Other Objective/Functional Measures:   Pt is 19 weeks 2 days post-op    Supine AAROM flexion: 162 deg   ER at 45: 72 (was 60 last time measured)     Pain Level (0-10 scale) post treatment: 0    ASSESSMENT/Changes in Function: increased ER at 45 deg by 12 degrees today. Demo's improved mechanics for Prone T's with less scapular winging and more appropriate ROM for H abduction. Min hike remains with flexion and with initial ROM for abduction. Form correction for tband ER to avoid torso rotation compensations . Pt challenged by high table push ups due to pre-existing difficulties with push ups but able to complete today. Fatigued in the shoulder. Overall demo'ing improved ROm, improved strength and con't abolished pain. Patient will continue to benefit from skilled PT services to modify and progress therapeutic interventions, address functional mobility deficits, address ROM deficits, address strength deficits, analyze and address soft tissue restrictions, analyze and cue movement patterns, analyze and modify body mechanics/ergonomics, assess and modify postural abnormalities, address imbalance/dizziness and instruct in home and community integration to attain remaining goals. []  See Plan of Care  [x]  See progress note/recertification  []  See Discharge Summary         Progress towards goals / Updated goals:  1. Pt will be independent and compliant with updated HEP to progress to DC. PN: review and update verbally each session  Current: review importance of ER ROM (8/30/22)   2. Pt will have an increase in AROM of the R GHJ to > or = 150 deg elevation with normal scapulohumeral rhythm to restore ADLs and driving. PN: AROM flexion 132 sitting  abduction 120  Current: supine AAROM flexion 162 deg  (8/30/22)  3.  Pt will have > or = 4/5 MMT of R GHJ flexion, scaption, ER and IR to begin to restore ADLs, self-care, dressing. PN: Progressing; Flexion/ER 4/5, extension 5/5, abduction 4-/5, SA 3/5  Current; progressing program with great response and appropriate DOMS (9/1/22)  4. Patient will demo AROM ER to 60 deg per protocol for progression to decrease impingement risk. PN: AROM Er at 45: 58 deg, PROM ER at 45: 60; at 90 : 62 deg  Current: progressing; @45: 72 deg; @ 90: 65 deg  (9/1/22)  5. Patient will increase FOTO score to 64/100 for indications of increased functional mobility.   PN: FOTO 50 pts       PN due 9/9/22    PLAN  [x]  Upgrade activities as tolerated     [x]  Continue plan of care  []  Update interventions per flow sheet       []  Discharge due to:_  [x]  Other: PN due next week     Sandie Heredia PT 9/1/2022  9:10 AM    Future Appointments   Date Time Provider Jami Parrish   9/1/2022 11:30 AM Fleet Gash, PT [facility]   9/6/2022 11:30 AM Fleet Gash, PT [facility]   9/8/2022 11:30 AM Fleet Gash, PT [facility]   9/13/2022 11:30 AM Fleet Gash, PT [facility]   9/15/2022 11:30 AM Fleet Gash, PT [facility]   9/20/2022 11:30 AM Fleet Gash, PT [facility]   9/22/2022 11:30 AM Fleet Gash, PT [facility]   9/27/2022 11:30 AM Fleet Gash, [facility]   9/29/2022 11:30 AM Fleet Gash, PT [facility]   10/4/2022 11:30 AM Fleet Gash, PT [facility]   10/6/2022 11:30 AM Fleet Gash, PT [facility]   10/11/2022 11:30 AM Fleet Gash, PT [facility]   10/13/2022 11:30 AM Fleet Gash, PT [facility]   10/18/2022 11:30 AM Fleet Gash, PT [facility]   10/20/2022 11:30 AM Fleet Gash, PT [facility]   10/25/2022 11:30 AM Fleet Gash, PT [facility]   10/27/2022 11:30 AM Fleet Gash, PT [facility]

## 2022-09-06 ENCOUNTER — HOSPITAL ENCOUNTER (OUTPATIENT)
Dept: PHYSICAL THERAPY | Age: 57
Discharge: HOME | End: 2022-09-06
Payer: COMMERCIAL

## 2022-09-06 PROCEDURE — 97140 MANUAL THERAPY 1/> REGIONS: CPT

## 2022-09-06 PROCEDURE — 97112 NEUROMUSCULAR REEDUCATION: CPT

## 2022-09-06 PROCEDURE — 97110 THERAPEUTIC EXERCISES: CPT

## 2022-09-06 NOTE — PROGRESS NOTES
PT DAILY TREATMENT NOTE     Patient Name: Isaiah Landaverde  Date:2022  : 1965  [x]  Patient  Verified  Payor: BLUE CROSS / Plan: [employer] [facility] / Product Type: PPO /    In time: 11:30   Out time: 12:25  Total Treatment Time (min):  55  Visit #: 3 of 4-8    Medicare/BCBS Only   Total Timed Codes (min): 55 1:1 Treatment Time: 55       Treatment Area: Right shoulder pain [M25.511]  Acute post-operative pain [G89.18]    SUBJECTIVE  Pain Level (0-10 scale):  0  Any medication changes, allergies to medications, adverse drug reactions, diagnosis change, or new procedure performed?: [x] No    [] Yes (see summary sheet for update)  Subjective functional status/changes:   [] No changes reported  \"I'm dragging today. Not sleeping well. Particularly bad the last few days. Not related to shoulder problems though. OBJECTIVE    30 min Therapeutic Exercise:  [x] See flow sheet :  -3 way cans done in standing today with no mirror feedback ; 2# for 5 reps each, then drop set to 1# to avoid shoulder hike   Rationale: increase ROM and increase strength to improve the patients ability to perform ADLs with improved shoulder/elbow strength and mobility. 15 min Neuromuscular re-education  [x] See flow sheet :  -PNF: Performed tband \"X\"  -ball on wall ABC - 120 deg scaption ; NV  -wall V with lift off 10x5\"  -added wall box with YTB - with a trunk lean so she's body weight bearing in the single arm   -bodyblade: 2x 30\" IR/ER at 0 deg abduction and 90 deg flexion     Rationale: increase ROM and increase strength to improve the patients ability to perform ADLs with improved shoulder/elbow strength and mobility. 10 min Manual Therapy:  R GHJ distraction; R shoulder inferior glide grade 4, posterior glide grade 3-4, manual stretching into flexion/scaption/ER/IR with scap stabilization and GHJ distraction;    The manual therapy interventions were performed at a separate and distinct time from the therapeutic activities interventions. Rationale: decrease pain, increase ROM, increase tissue extensibility, decrease trigger points and increase postural awareness to improve ease of ADLs in the home environment. With   [x] TE   [] TA   [] neuro   [] other: Patient Education: [x] Review HEP    [] Progressed/Changed HEP based on:   [x] positioning   [x] body mechanics   [] transfers   [] heat/ice application    [] other:     Importance of ER ROM for HEP this week   -Pt marisa's Er at 90/90 for her HEP recently.   -straight plane flexion ROM with pt OP at wall      Other Objective/Functional Measures:   Pt is 20 weeks 0 days post-op       AROM (deg)                  PROM (deg)    Right Left Right   Flexion (seated/supine) 135 (162 supine)   NV   Extension 65 deg        Scaption 150       Abduction 150;        ER at 0 Deg ABD 62   @45: 72 deg   IR at 45 Deg ABD     71   FIR T10 T4  T10       Strength:    Right (/5) Left (/5)   GHJ   Flexion 4 5             Abduction 4 5             Extension 5 5             ER 4 5             IR 4+ 5             Serratus ant. 3+ 4+   Bicep/Tricep: R 4+/5, L 5/5  Wrist: WNL    Pain Level (0-10 scale) post treatment: 0    ASSESSMENT/Changes in Function: progressing with ROM in scaption and abduction as well as FIR. Limited in flexion but addressing with HEP. Scapular stability improving. Patient will continue to benefit from skilled PT services to modify and progress therapeutic interventions, address functional mobility deficits, address ROM deficits, address strength deficits, analyze and address soft tissue restrictions, analyze and cue movement patterns, analyze and modify body mechanics/ergonomics, assess and modify postural abnormalities, address imbalance/dizziness and instruct in home and community integration to attain remaining goals. []  See Plan of Care  []  See progress note/recertification  []  See Discharge Summary         Progress towards goals / Updated goals:  1. Pt will be independent and compliant with updated HEP to progress to DC. PN: review and update verbally each session  Current: review importance of ER and flexion ROM (9/6/22)   2. Pt will have an increase in AROM of the R GHJ to > or = 150 deg elevation with normal scapulohumeral rhythm to restore ADLs and driving. PN: AROM flexion 132 sitting  abduction 120  Current: seated 135 deg, supine AAROM flexion 162 deg  (9/6/22)  3. Pt will have > or = 4/5 MMT of R GHJ flexion, scaption, ER and IR to begin to restore ADLs, self-care, dressing. PN: Progressing; Flexion/ER 4/5, extension 5/5, abduction 4-/5, SA 3/5  Current: flexion / ER 4/5, extension 5/5, abduction 4/5, SA 3+/5, IR 4+/5 (9/6/22)  4. Patient will demo AROM ER to 60 deg per protocol for progression to decrease impingement risk. PN: AROM Er at 45: 58 deg, PROM ER at 45: 60; at 90 : 62 deg  Current: progressing; @45: 72 deg; @ 90: 65 deg  (9/1/22)  5. Patient will increase FOTO score to 64/100 for indications of increased functional mobility.   PN: FOTO 50 pts       PN due 9/18/22    PLAN  [x]  Upgrade activities as tolerated     [x]  Continue plan of care  []  Update interventions per flow sheet       []  Discharge due to:_  [x]  Other: consider single arm Prone \"I\" NV for ROM and strength into flexion     Moses Rosen PT 9/6/2022  9:10 AM    Future Appointments   Date Time Provider Jami Parrish   9/6/2022 11:30 AM Laney Manley PT [facility]   9/8/2022 11:30 AM Laney Manley PT [facility]   9/13/2022 11:30 AM Laney Manley PT [facility]   9/15/2022 11:30 AM Laney Manley PT [facility]   9/20/2022 11:30 AM Laney Parent, PT [facility]   9/22/2022 11:30 AM Laney Parent, PT [facility]   9/27/2022 11:30 AM Laney Parent, PT [facility]   9/29/2022 11:30 AM Laney Parent, PT [facility]   10/4/2022 11:30 AM Laney Parent, PT [facility]   10/6/2022 11:30 AM Laney Parent, PT [facility]   10/11/2022 11:30 AM Jeanne Estrada Sondra Nogueira, PT [facility]   10/13/2022 11:30 AM Addis Backers, PT [facility]   10/18/2022 11:30 AM Addis Backers, PT [facility]   10/20/2022 11:30 AM Addis Backers, PT [facility]   10/25/2022 11:30 AM Addis Backers, PT [facility]   10/27/2022 11:30 AM Addis Backers, PT [facility]

## 2022-09-08 ENCOUNTER — HOSPITAL ENCOUNTER (OUTPATIENT)
Dept: PHYSICAL THERAPY | Age: 57
Discharge: HOME | End: 2022-09-08
Payer: COMMERCIAL

## 2022-09-08 PROCEDURE — 97140 MANUAL THERAPY 1/> REGIONS: CPT

## 2022-09-08 PROCEDURE — 97110 THERAPEUTIC EXERCISES: CPT

## 2022-09-08 PROCEDURE — 97112 NEUROMUSCULAR REEDUCATION: CPT

## 2022-09-08 NOTE — PROGRESS NOTES
PT DAILY TREATMENT NOTE     Patient Name: Ken Davis  Date:2022  : 1965  [x]  Patient  Verified  Payor: BLUE CROSS / Plan: [employer] [facility] / Product Type: PPO /    In time: 11:34  Out time: 12:30  Total Treatment Time (min):  56  Visit #: 4 of     Medicare/BCBS Only   Total Timed Codes (min): 56 1:1 Treatment Time: 41       Treatment Area: Right shoulder pain [M25.511]  Acute post-operative pain [G89.18]    SUBJECTIVE  Pain Level (0-10 scale): 0  Any medication changes, allergies to medications, adverse drug reactions, diagnosis change, or new procedure performed?: [x] No    [] Yes (see summary sheet for update)  Subjective functional status/changes:   [] No changes reported  \"I'm sore in the (points to anterior deltoid)\"    OBJECTIVE    29 min Therapeutic Exercise:  [x] See flow sheet :  -3 way cans done in standing today with no mirror feedback ; 2# for 5 reps each, then drop set to 1# to avoid shoulder hike       Rationale: increase ROM and increase strength to improve the patients ability to perform ADLs with improved shoulder/elbow strength and mobility. 15 min Neuromuscular re-education  [x] See flow sheet :  -PNF: Performed tband \"X\"; attempted to increase to Red but can't perform PNF D2 extension with good form.   -ball on wall ABC - 120 deg scaption ; NV  -wall V with lift off 10x5\" with YELLOW tband (added 22)  -added wall box with YTB - with a trunk lean so she's body weight bearing in the single arm   -bodyblade: 2x 30\" IR/ER at 0 deg abduction; 1x30\" at 45 deg abduction (IR/ER), and 90 deg scaption     Rationale: increase ROM and increase strength to improve the patients ability to perform ADLs with improved shoulder/elbow strength and mobility.     12 min Manual Therapy: IASTm to R anterior and mid deltoid; DTm and TrP release to R UT;  R GHJ distraction; R shoulder inferior glide grade 4, posterior glide grade 3-4, manual stretching into flexion/scaption/ER/IR with scap stabilization and GHJ distraction; The manual therapy interventions were performed at a separate and distinct time from the therapeutic activities interventions. Rationale: decrease pain, increase ROM, increase tissue extensibility, decrease trigger points and increase postural awareness to improve ease of ADLs in the home environment. With   [x] TE   [] TA   [] neuro   [] other: Patient Education: [x] Review HEP    [] Progressed/Changed HEP based on:   [x] positioning   [x] body mechanics   [] transfers   [] heat/ice application    [] other:     Importance of ER ROM for HEP this week   -Pt marisa's Er at 90/90 for her HEP recently.   -straight plane flexion ROM with pt OP at wall      Other Objective/Functional Measures:   Pt is 20 weeks 2 days post-op    Standing FIR lift off: can perform from the iliac crest and fully lift off body; can't perform from her full AROM FIR range. Pain Level (0-10 scale) post treatment: 0    ASSESSMENT/Changes in Function: progressing with strength gains and control of scapulohumeral rhythm. Still limited tolerance to flexion AROM and PROM. Patient will continue to benefit from skilled PT services to modify and progress therapeutic interventions, address functional mobility deficits, address ROM deficits, address strength deficits, analyze and address soft tissue restrictions, analyze and cue movement patterns, analyze and modify body mechanics/ergonomics, assess and modify postural abnormalities, address imbalance/dizziness and instruct in home and community integration to attain remaining goals. []  See Plan of Care  []  See progress note/recertification  []  See Discharge Summary         Progress towards goals / Updated goals:  1. Pt will be independent and compliant with updated HEP to progress to DC. PN: review and update verbally each session  Current: review importance of ER and flexion ROM (9/6/22)   2.   Pt will have an increase in AROM of the R GHJ to > or = 150 deg elevation with normal scapulohumeral rhythm to restore ADLs and driving. PN: AROM flexion 132 sitting  abduction 120  Current: seated 135 deg, supine AAROM flexion 162 deg  (9/6/22)  3. Pt will have > or = 4/5 MMT of R GHJ flexion, scaption, ER and IR to begin to restore ADLs, self-care, dressing. PN: Progressing; Flexion/ER 4/5, extension 5/5, abduction 4-/5, SA 3/5  Current: flexion / ER 4/5, extension 5/5, abduction 4/5, SA 3+/5, IR 4+/5 (9/6/22)  4. Patient will demo AROM ER to 60 deg per protocol for progression to decrease impingement risk. PN: AROM Er at 45: 58 deg, PROM ER at 45: 60; at 90 : 62 deg  Current: progressing; @45: 72 deg; @ 90: 65 deg  (9/1/22)  5. Patient will increase FOTO score to 64/100 for indications of increased functional mobility.   PN: FOTO 50 pts       PN due 9/18/22    PLAN  [x]  Upgrade activities as tolerated     [x]  Continue plan of care  []  Update interventions per flow sheet       []  Discharge due to:_  [x]  Other:-prone single UE \"I\" for ROM and strength in flexion     Madelyn Calderon, PT 9/8/2022  9:10 AM    Future Appointments   Date Time Provider Jami Parrish   9/8/2022 11:30 AM Jesikane Ree, PT [facility]   9/13/2022 10:00 AM Marilyne Ree, PT [facility]   9/15/2022 11:30 AM Marilyne Ree, PT [facility]   9/20/2022 11:30 AM Marilyne Ree, PT [facility]   9/22/2022 11:30 AM Marilyne Ree, PT [facility]   9/27/2022 11:30 AM Marimalvinne Ree, PT [facility]   9/29/2022 11:30 AM Marilyne Ree, PT [facility]   10/4/2022 11:30 AM Marilyne Ree, PT [facility]   10/6/2022 11:30 AM Marilyne Ree, PT [facility]   10/11/2022 11:30 AM Marilyne Ree, PT [facility]   10/13/2022 11:30 AM Marilyne Ree, PT [facility]   10/18/2022 11:30 AM Marilyne Ree, PT [facility]   10/20/2022 11:30 AM Marilyne Ree, PT [facility]   10/25/2022 11:30 AM Marilyne Ree, PT [facility]   10/27/2022 11:30 AM Marilyne Ree, PT [facility]

## 2022-09-13 ENCOUNTER — HOSPITAL ENCOUNTER (OUTPATIENT)
Dept: PHYSICAL THERAPY | Age: 57
Discharge: HOME | End: 2022-09-13
Payer: COMMERCIAL

## 2022-09-13 PROCEDURE — 97110 THERAPEUTIC EXERCISES: CPT

## 2022-09-13 PROCEDURE — 97112 NEUROMUSCULAR REEDUCATION: CPT

## 2022-09-13 PROCEDURE — 97140 MANUAL THERAPY 1/> REGIONS: CPT

## 2022-09-13 NOTE — PROGRESS NOTES
PT DAILY TREATMENT NOTE     Patient Name: Lj Carrillo  YLLF:[id number]  : 1965  [x]  Patient  Verified  Payor: BLUE CROSS / Plan: [employer] [facility] [place] / Product Type: PPO /    In time: 10:02 Out time: 10:51  Total Treatment Time (min):  49  Visit #: 5 of 4-8    Medicare/BCBS Only   Total Timed Codes (min): 49 1:1 Treatment Time: 49       Treatment Area: Right shoulder pain [M25.511]  Acute post-operative pain [G89.18]    SUBJECTIVE  Pain Level (0-10 scale): 1  Any medication changes, allergies to medications, adverse drug reactions, diagnosis change, or new procedure performed?: [x] No    [] Yes (see summary sheet for update)  Subjective functional status/changes:   [] No changes reported  \"Some nagging in the shoulder at night when I lay on it and then when I do my stretches. The [facility] and the external is so hard\". The upper trap is sore again. OBJECTIVE    21 min Therapeutic Exercise:  [x] See flow sheet :  -3 way cans done in standing today with no mirror feedback ; 2# for 5 reps each, then drop set to 1# to avoid shoulder hike     Rationale: increase ROM and increase strength to improve the patients ability to perform ADLs with improved shoulder/elbow strength and mobility. 10 min Neuromuscular re-education  [x] See flow sheet :  -PNF: Performed tband \"X\" - NV  -ball on wall ABC - 120 deg scaption ; NV  -wall V with lift off 10x5\" with YELLOW tband  -wall box with YTB - with a trunk lean so she's body weight bearing in the single arm   -bodyblade: 1x30\" at 45 deg abduction (IR/ER), and 90 deg flexion   Rationale: increase ROM and increase strength to improve the patients ability to perform ADLs with improved shoulder/elbow strength and mobility.     18 min Manual Therapy:DTm and TrP release to R UT;  R GHJ distraction; R shoulder inferior glide grade 4 in 90 deg abduction , posterior glide grade 3-4, manual stretching into flexion/scaption/ER/IR with scap stabilization and GHJ distraction The manual therapy interventions were performed at a separate and distinct time from the therapeutic activities interventions. Rationale: decrease pain, increase ROM, increase tissue extensibility, decrease trigger points and increase postural awareness to improve ease of ADLs in the home environment. With   [x] TE   [] TA   [] neuro   [] other: Patient Education: [x] Review HEP    [] Progressed/Changed HEP based on:   [x] positioning   [x] body mechanics   [] transfers   [] heat/ice application    [] other:     Discussed avoidance of sleeping on the R for a period of time to allow shoulder irritation to calm down  -\"yellow light\" pain - soreness worse with movement and stretching but resolving quickly    -scapular mechanics for elevation  -avoiding shoulder hike with AROM and watching excessive shoulder hike with end ranges ROM      Other Objective/Functional Measures:   Pt is 21 weeks 0 days post-op    AROM R GHJ seated flexion: 135  ER at 0:65    Er at 45: (standing): 80  FIR: T11     Pain Level (0-10 scale) post treatment: 0    ASSESSMENT/Changes in Function: lacking scapulohumeral dissociation with full range abduction. Limited inferior GHJ glide and benefits from aggressive mobs. Pt con't to incresae in ROM vs the last time measured but con't to benefit from mobs and all forms of ROM for the R shoulder. Progressing appropriately for a post-operative massive RTC tear    Patient will continue to benefit from skilled PT services to modify and progress therapeutic interventions, address functional mobility deficits, address ROM deficits, address strength deficits, analyze and address soft tissue restrictions, analyze and cue movement patterns, analyze and modify body mechanics/ergonomics, assess and modify postural abnormalities, address imbalance/dizziness and instruct in home and community integration to attain remaining goals.      []  See Plan of Care  []  See progress note/recertification  []  See Discharge Summary         Progress towards goals / Updated goals:  1. Pt will be independent and compliant with updated HEP to progress to DC. PN: review and update verbally each session  Current: review importance of ER and flexion ROM; avoid UT hike and compensatory movements (9/13/22)   2. Pt will have an increase in AROM of the R GHJ to > or = 150 deg elevation with normal scapulohumeral rhythm to restore ADLs and driving. PN: AROM flexion 132 sitting  abduction 120  Current: seated 135 deg, supine AAROM flexion 162 deg  (9/13/22)  3. Pt will have > or = 4/5 MMT of R GHJ flexion, scaption, ER and IR to begin to restore ADLs, self-care, dressing. PN: Progressing; Flexion/ER 4/5, extension 5/5, abduction 4-/5, SA 3/5  Current: flexion / ER 4/5, extension 5/5, abduction 4/5, SA 3+/5, IR 4+/5 (9/6/22)  4. Patient will demo AROM ER to 60 deg per protocol for progression to decrease impingement risk. PN: AROM Er at 45: 58 deg, PROM ER at 45: 60; at 90 : 62 deg  Current: progressing; @45: 80 deg (PROM); @ 90: 65 deg  (9/13/22)  5. Patient will increase FOTO score to 64/100 for indications of increased functional mobility.   PN: FOTO 50 pts       PN due 9/18/22    PLAN  [x]  Upgrade activities as tolerated     [x]  Continue plan of care  []  Update interventions per flow sheet       []  Discharge due to:_  [x]  Other:-prone single UE \"I\" for ROM and strength in flexion     Elie Boles PT 9/13/2022  9:10 AM    Future Appointments   Date Time Provider Jami Parrish   9/13/2022 10:00 AM Mar Jernigan PT [facility]   9/15/2022 11:30 AM Mar Jernigan PT [facility]   9/20/2022 11:30 AM Mar Jernigan PT [facility]   9/22/2022 11:30 AM Mar Balzarine, PT [facility]   9/27/2022 11:30 AM Mar Balzarine, PT [facility]   9/29/2022 11:30 AM Mar Balzarine, PT [facility]   10/4/2022 11:30 AM Mar Balzarine, PT [facility]   10/6/2022 11:30 AM Mar Balzarine, PT [facility]   10/11/2022 11:30 AM Tanya Echols, PT [facility] [address]   10/13/2022 11:30 AM Tanya Echols, PT [facility] [address]   10/18/2022 11:30 AM Tanya Echols, PT [facility] [address]   10/20/2022 11:30 AM Tanya Echols, PT [facility] [address]   10/25/2022 11:30 AM Tanya Echols, PT [facility] [address]   10/27/2022 11:30 AM Tanya Echols, PT [facility] [address]

## 2022-09-15 ENCOUNTER — HOSPITAL ENCOUNTER (OUTPATIENT)
Dept: PHYSICAL THERAPY | Age: 57
Discharge: HOME | End: 2022-09-15
Payer: COMMERCIAL

## 2022-09-15 PROCEDURE — 97140 MANUAL THERAPY 1/> REGIONS: CPT

## 2022-09-15 PROCEDURE — 97110 THERAPEUTIC EXERCISES: CPT

## 2022-09-15 PROCEDURE — 97112 NEUROMUSCULAR REEDUCATION: CPT

## 2022-09-15 NOTE — PROGRESS NOTES
PT DAILY TREATMENT NOTE     Patient Name: Isaiah Landaverde  ZSYB:  : 1965  [x]  Patient  Verified  Payor: BLUE CROSS / Plan: [employer] [facility] / Product Type: PPO /    In time: 11:30  Out time: 12:30  Total Treatment Time (min):  60  Visit #: 6 of 4-8    Medicare/BCBS Only   Total Timed Codes (min): 60 1:1 Treatment Time: 60       Treatment Area: Right shoulder pain [M25.511]  Acute post-operative pain [G89.18]    SUBJECTIVE  Pain Level (0-10 scale): 0  Any medication changes, allergies to medications, adverse drug reactions, diagnosis change, or new procedure performed?: [x] No    [] Yes (see summary sheet for update)  Subjective functional status/changes:   [] No changes reported  \"Doing well. Sore on both sides\"     OBJECTIVE    13 min Therapeutic Exercise:  [x] See flow sheet :  -3 way cans done in standin# flexion  -added pec stretch at the wall (low, medium and high)     Rationale: increase ROM and increase strength to improve the patients ability to perform ADLs with improved shoulder/elbow strength and mobility. 34 min Neuromuscular re-education  [x] See flow sheet :  -ER/IR progressed to Ke[place] to challenge neuromuscular control of the R RTC  -PNF: Performed tband \"X\"  -ball on wall ABC - 120 deg scaption and flexion 30\" each   -wall V with lift off 10x5\" with YELLOW tband  -wall box with YTB - with a trunk lean so she's body weight bearing in the single arm  - NV  -bodyblade: 1x30\" at 0 deg abduction 45 deg abduction (IR/ER), and 90 deg scaption  -added single arm prone \"I\" over edge of table    Rationale: increase ROM and increase strength to improve the patients ability to perform ADLs with improved shoulder/elbow strength and mobility.     13 min Manual Therapy:DTm and TrP release to R UT;  R GHJ distraction; R shoulder inferior glide grade 4 in 90 deg abduction , posterior glide grade 3-4, manual stretching into flexion/scaption/ER/IR with scap stabilization and GHJ distraction   The manual therapy interventions were performed at a separate and distinct time from the therapeutic activities interventions. Rationale: decrease pain, increase ROM, increase tissue extensibility, decrease trigger points and increase postural awareness to improve ease of ADLs in the home environment. With   [x] TE   [] TA   [] neuro   [] other: Patient Education: [x] Review HEP    [] Progressed/Changed HEP based on:   [x] positioning   [x] body mechanics   [] transfers   [] heat/ice application    [] other:     Added pec stretch low and high to address reaching into the back of the car.   -seated inferior GHJ glide to humeral head at 90/90 using a dog leash for mobs: demo and return demo      Other Objective/Functional Measures:   Pt is 21 weeks 2 days post-op    AROM R GHJ seated flexion: 135  ER at 0:65    Er at 45: (standing): 80  FIR: T11     FOTO 68/100    Pain Level (0-10 scale) post treatment: 0    ASSESSMENT/Changes in Function: FOTO goal met. Improved ROM and control with abduction AROM. Demo's no compensations with PNF patterns. Patient will continue to benefit from skilled PT services to modify and progress therapeutic interventions, address functional mobility deficits, address ROM deficits, address strength deficits, analyze and address soft tissue restrictions, analyze and cue movement patterns, analyze and modify body mechanics/ergonomics, assess and modify postural abnormalities, address imbalance/dizziness and instruct in home and community integration to attain remaining goals. []  See Plan of Care  []  See progress note/recertification  []  See Discharge Summary         Progress towards goals / Updated goals:  1. Pt will be independent and compliant with updated HEP to progress to DC. PN: review and update verbally each session  Current: inferior glides at 90/90; pec stretch (9/15/22)   2.   Pt will have an increase in AROM of the R GHJ to > or = 150 deg elevation with normal scapulohumeral rhythm to restore ADLs and driving. PN: AROM flexion 132 sitting  abduction 120  Current: seated 135 deg, supine AAROM flexion 162 deg  (9/13/22)  3. Pt will have > or = 4/5 MMT of R GHJ flexion, scaption, ER and IR to begin to restore ADLs, self-care, dressing. PN: Progressing; Flexion/ER 4/5, extension 5/5, abduction 4-/5, SA 3/5  Current: flexion / ER 4/5, extension 5/5, abduction 4/5, SA 3+/5, IR 4+/5 (9/6/22)  4. Patient will demo AROM ER to 60 deg per protocol for progression to decrease impingement risk. PN: AROM Er at 45: 58 deg, PROM ER at 45: 60; at 90 : 62 deg  Current: progressing; @45: 80 deg (PROM); @ 90: 65 deg  (9/13/22)  5. Patient will increase FOTO score to 64/100 for indications of increased functional mobility.   PN: FOTO 50 pts    Current: Goal met at 68/100 (9/15/22)     PN due 9/18/22    PLAN  [x]  Upgrade activities as tolerated     [x]  Continue plan of care  []  Update interventions per flow sheet       []  Discharge due to:_  [x]  Other: PN due next week     Alice Hodge PT 9/15/2022  9:10 AM    Future Appointments   Date Time Provider Jami Parrish   9/15/2022 11:30 AM Stephanie Jerry, PT [facility]   9/20/2022 11:30 AM Stephanie Jerry, PT [facility]   9/22/2022 11:30 AM Stephanie Jerry, PT [facility]   9/27/2022 11:30 AM Stephanie Jerry, PT [facility]   9/29/2022 11:30 AM Stephanie Jerry, PT [facility]   10/4/2022 11:30 AM Stephanie Jerry, PT [facility]   10/6/2022 11:30 AM Alba Rosalino, PT [facility]   10/11/2022 11:30 AM Alba Rosalino, PT [facility]   10/13/2022 11:30 AM Alba Rosalino, PT [facility]   10/18/2022 11:30 AM Alba Rosalino, PT [facility]   10/20/2022 11:30 AM Alba Rosalino, PT [facility]   10/25/2022 11:30 AM Alba Rosalino, PT [facility]   10/27/2022 11:30 AM Alba Rosalino, PT [facility]

## 2022-09-20 ENCOUNTER — HOSPITAL ENCOUNTER (OUTPATIENT)
Dept: PHYSICAL THERAPY | Age: 57
Discharge: HOME | End: 2022-09-20
Payer: COMMERCIAL

## 2022-09-20 PROCEDURE — 97112 NEUROMUSCULAR REEDUCATION: CPT

## 2022-09-20 PROCEDURE — 97110 THERAPEUTIC EXERCISES: CPT

## 2022-09-20 PROCEDURE — 97530 THERAPEUTIC ACTIVITIES: CPT

## 2022-09-20 PROCEDURE — 97140 MANUAL THERAPY 1/> REGIONS: CPT

## 2022-09-20 NOTE — PROGRESS NOTES
PT DAILY TREATMENT NOTE     Patient Name: Cole Loera  Date:2022  : 1965  [x]  Patient  Verified  Payor: BLUE CROSS / Plan: [employer] [facility] [place] / Product Type: PPO /    In time: 11:36  Out time: 12:28  Total Treatment Time (min):  52  Visit #: 7 of 4-8    Medicare/BCBS Only   Total Timed Codes (min):52 1:1 Treatment Time: 52       Treatment Area: Right shoulder pain [M25.511]  Acute post-operative pain [G89.18]    SUBJECTIVE  Pain Level (0-10 scale): 0  Any medication changes, allergies to medications, adverse drug reactions, diagnosis change, or new procedure performed?: [x] No    [] Yes (see summary sheet for update)  Subjective functional status/changes:   [] No changes reported  \" I am stiff today> I did a million things like moving, gardening, working, etc.      OBJECTIVE    10 min Therapeutic Exercise:  [x] See flow sheet :   Reassessment for PN      Rationale: increase ROM and increase strength to improve the patients ability to perform ADLs with improved shoulder/elbow strength and mobility. 10 min There Act [x] See flow sheet :  -3 way cans done in standin# flexion  -POC discussion      Rationale: increase ROM and increase strength to improve the patients ability to perform ADLs with improved shoulder/elbow strength and mobility. 14 min Neuromuscular re-education  [x] See flow sheet :  -ER/IR for NMRed after RTC repair    -discussed Neuromuscular fatigue   Rationale: increase ROM and increase strength to improve the patients ability to perform ADLs with improved shoulder/elbow strength and mobility.     18 min Manual Therapy: DTm and TrP release to R UT, deltoid, infraspinatus;  R GHJ distraction; R shoulder inferior glide grade 4 in 90 deg abduction , posterior glide grade 3-4, manual stretching into flexion/scaption/ER/IR with scap stabilization and GHJ distraction; Scapular mobs in L SL ; clavicular upwards rotation    The manual therapy interventions were performed at a separate and distinct time from the therapeutic activities interventions. Rationale: decrease pain, increase ROM, increase tissue extensibility, decrease trigger points and increase postural awareness to improve ease of ADLs in the home environment.           With   [x] TE   [] TA   [] neuro   [] other: Patient Education: [x] Review HEP    [] Progressed/Changed HEP based on:   [x] positioning   [x] body mechanics   [] transfers   [] heat/ice application    [] other:     -review PN for MD      Other Objective/Functional Measures:   Pt is 22 weeks 0 days post-op    Subjective Gains: all ADLs; heavy tasks (open doors, close car trunk); improved shoulder strength (lifting, etc); more natural right shoulder movement; able to sleep a little bit on right side most of the time (some discomfort), pain improvements; improved function even though lacking full FIR, reach to back of head  Subjective Deficits:  Full ROM into elevation (AROM and PROM - not noticed with ADLs as much as with focused stretching); reaching into medicine cabinet/ fast reaching; unable to reach behind back; sleeping on the R   % improvement: 70%  Pain   Average: 0/10                     Best: 0/10                   Worst: 0/10  Patient Goal: \"to get to 100%\"     Objective Measures:   Shoulder AROM/PROM:                                           AROM (deg)              PROM (deg)    Right Left Right   Flexion (seated/supine) 132 (sit); 150 (supine)   155   Extension 65 deg    70   Scaption 142   145    Abduction 145   150   ER at 0 Deg ABD 45 (58 at best)   @45: 72 deg; @ 90: 82 deg    IR at 45 Deg ABD     62    FIR T10   ----      Strength:    Right (/5) Left (/5)   GHJ   Flexion 4+ 5             Abduction 4 5             Extension 5 5             ER 4 5             IR 4+ 5             Serratus ant. 4 5   Bicep/Tricep: R 4+/5, L 5/5  Wrist: WNL  : WNL    FOTO 68/100    Pain Level (0-10 scale) post treatment: 0    ASSESSMENT/Changes in Function: see PN. Pt was atypically tight today vs previous sessions. In general progressing well with some limitations in end range flexion, ER today. Patient will continue to benefit from skilled PT services to modify and progress therapeutic interventions, address functional mobility deficits, address ROM deficits, address strength deficits, analyze and address soft tissue restrictions, analyze and cue movement patterns, analyze and modify body mechanics/ergonomics, assess and modify postural abnormalities, address imbalance/dizziness and instruct in home and community integration to attain remaining goals. []  See Plan of Care  []  See progress note/recertification  []  See Discharge Summary         Progress towards goals / Updated goals:  1. Pt will be independent and compliant with updated HEP to progress to DC. PN: review and update verbally each session  Current: inferior glides at 90/90; pec stretch (9/15/22)   2. Pt will have an increase in AROM of the R GHJ to > or = 150 deg elevation with normal scapulohumeral rhythm to restore ADLs and driving. PN: AROM flexion 132 sitting  abduction 120  Current: seated 135 deg, supine AAROM flexion 162 deg (at best) (9/20/22)  3. Pt will have > or = 4/5 MMT of R GHJ flexion, scaption, ER and IR to begin to restore ADLs, self-care, dressing. PN: Progressing; Flexion/ER 4/5, extension 5/5, abduction 4-/5, SA 3/5  Current: flexion 4+; ER 4/5, extension 5/5, abduction 4+/5, SA 4/5, IR 4+/5 (9/20/22)  4. Patient will demo AROM ER to 60 deg per protocol for progression to decrease impingement risk. PN: AROM Er at 45: 58 deg, PROM ER at 45: 60; at 90 : 62 deg  Current: progressing; @45: 72 deg (PROM); @ 90: 82 deg  (9/20/22)  5. Patient will increase FOTO score to 64/100 for indications of increased functional mobility.   PN: FOTO 50 pts    Current: Goal met at 68/100 (9/15/22)     PN due 9/18/22    PLAN  [x]  Upgrade activities as tolerated     [x]  Continue plan of care  [] Update interventions per flow sheet       []  Discharge due to:_  [x]  Other: con't with PT 1-2x/week for  4-10 sessions    Stephanie Mac, PT 9/20/2022  9:10 AM    Future Appointments   Date Time Provider Jami Parrish   9/20/2022 11:30 AM Rohan Bansal, PT [facility]   9/22/2022 11:30 AM Rohan Bansal, PT [facility]   9/27/2022 11:30 AM [facility]al[facility]   9/29/2022 11:30 AM Rohan Bansal, PT [facility]   10/4/2022 11:30 AM Rohan Bansal, PT [facility]   10/6/2022 11:30 AM Rohan Bansal, PT [facility]   10/11/2022 11:30 AM [facility]al, [facility]   10/13/2022 11:30 AM Rohan Bansal, PT [facility]   10/18/2022 11:30 AM Rohan Bansal, PT [facility]   10/20/2022 11:30 AM Rohan Bansal, PT [facility]   10/25/2022 11:30 AM [facility]al[facility]   10/27/2022 11:30 AM Rohan Bansal, PT [facility]

## 2022-09-20 NOTE — PROGRESS NOTES
[facility] AT [address] Kalie Booth  Phone: [phone] Fax: [phone]  PROGRESS NOTE  Patient Name: Nicky Whitehead : 1965   Treatment/Medical Diagnosis: Right shoulder pain [M25.511]  Acute post-operative pain [G89.18]   Referral Source: Shiela Bishop*     Date of Initial Visit: 22 Attended Visits: 27 Missed Visits: 0     SUMMARY OF TREATMENT  Pt is a 61y.o. year old female who presents with  s/p R shoulder large RTC repair and biceps tenodesis DOS 2022. Ther ex including full R UE AROM, PROM and strengthening; stability and NMRed work for scapular control; DTM, joint mobs and PROM; PNF patterns for functional movements, CKC for shoulder stability; Patient education; HEP, cryotherapy for edema and pain management. CURRENT STATUS  Patient has attended PT for 27 sessions for the treatment of right large rotator cuff repair with associated biceps tenodesis and SAD. She is 22 weeks post-operative. She is progressing well with strength and with ROM in higher ranges. She con't to have restricted joint mobility and impaired scapulohumeral rhythm limiting full ER and flexion ranges. She benefits from aggressive joint mobs and PROM/HEP compliance to con't to improve her ROM. We co'nt to watch for and treat to avoid frozen shoulder due to her slowly progressing ROM throughout the whole course of rehab.       Subjective Gains: all ADLs; heavy tasks (open doors, close car trunk); improved shoulder strength (lifting, etc); more natural right shoulder movement; able to sleep a little bit on right side most of the time (some discomfort), pain improvements; improved function even though lacking full FIR, reach to back of head  Subjective Deficits:  Full ROM into elevation (AROM and PROM - not noticed with ADLs as much as with focused stretching); reaching into medicine cabinet/ fast reaching; unable to reach behind back; sleeping on the R   % improvement: 70%  Pain   Average: 0/10                     Best: 0/10                   Worst: 0/10  Patient Goal: \"to get to 100%\"     Objective Measures:   Shoulder AROM/PROM:                                           AROM (deg)              PROM (deg)    Right Left Right   Flexion (seated/supine) 132 (sit); 150 (supine)   155   Extension 65 deg    70   Scaption 142   145    Abduction 145   150   ER at 0 Deg ABD 45 (58 at best)   @45: 72 deg; @ 90: 82 deg    IR at 45 Deg ABD     62    FIR T10   ----      Strength:    Right (/5) Left (/5)   GHJ   Flexion 4+ 5             Abduction 4 5             Extension 5 5             ER 4 5             IR 4+ 5             Serratus ant. 4 5   Bicep/Tricep: R 4+/5, L 5/5  Wrist: WNL  : WNL     FOTO 68/100    Current Goals:  1. Pt will be independent and compliant with updated HEP to progress to DC. PN: review and update verbally each session  Current: inferior glides at 90/90; pec stretch (9/15/22)   2. Pt will have an increase in AROM of the R GHJ to > or = 150 deg elevation with normal scapulohumeral rhythm to restore ADLs and driving. PN: AROM flexion 132 sitting  abduction 120  Current: seated 135 deg, supine AAROM flexion 162 deg (at best) (9/20/22)  3. Pt will have > or = 4/5 MMT of R GHJ flexion, scaption, ER and IR to begin to restore ADLs, self-care, dressing. PN: Progressing; Flexion/ER 4/5, extension 5/5, abduction 4-/5, SA 3/5  Current: flexion 4+; ER 4/5, extension 5/5, abduction 4+/5, SA 4/5, IR 4+/5 (9/20/22)  4. Patient will demo AROM ER to 60 deg per protocol for progression to decrease impingement risk. PN: AROM Er at 45: 58 deg, PROM ER at 45: 60; at 90 : 62 deg  Current: progressing; @45: 72 deg (PROM); @ 90: 82 deg  (9/20/22)  5. Patient will increase FOTO score to 64/100 for indications of increased functional mobility.   PN: FOTO 50 pts    Current: Goal met at 68/100 (9/15/22)    New Goals to be achieved in __8-10__ treatments:  1. Pt will be independent and compliant with updated HEP to progress to DC. PN: review and update verbally each session  2. Pt will have an increase in AROM of the R GHJ to > or = 150 deg elevation with normal scapulohumeral rhythm to restore ADLs and driving. PN: seated 135 deg, supine AAROM flexion 162 deg (at best)   3. Pt will have > or = 4/5 MMT of R GHJ flexion, scaption, ER and IR to begin to restore ADLs, self-care, dressing. PN:  flexion 4+; ER 4/5, extension 5/5, abduction 4+/5, SA 4/5, IR 4+/5   4. Patient will demo AROM ER to 60 deg per protocol for progression to decrease impingement risk. PN: progressing; @45: 72 deg (PROM); @ 90: 82 deg      RECOMMENDATIONS  Pt to continue with skilled therapy for 1-2x/week for 8-10 sessions to improve shoulder mobility/strength to improve ease with overhead lifting, reaching, and full return to PLOF. If you have any questions/comments please contact us directly at [phone]   Thank you for allowing us to assist in the care of your patient. Therapist Signature: Gavino Perkins PT Date: 9/20/2022   Reporting Period  8/18/22 to 9/20/22  Time: 11:30am    NOTE TO PHYSICIAN:  PLEASE COMPLETE THE ORDERS BELOW AND FAX TO   [facility]: [phone]. If you are unable to process this request in 24 hours please contact our office: [phone].  ___ I have read the above report and request that my patient continue as recommended.   ___ I have read the above report and request that my patient continue therapy with the following changes/special instructions:_________________________________________________________   ___ I have read the above report and request that my patient be discharged from therapy. Physician Signature:        Date:       Time:                                                        Braulio Smith.

## 2022-09-22 ENCOUNTER — HOSPITAL ENCOUNTER (OUTPATIENT)
Dept: PHYSICAL THERAPY | Age: 57
Discharge: HOME | End: 2022-09-22
Payer: COMMERCIAL

## 2022-09-22 PROCEDURE — 97140 MANUAL THERAPY 1/> REGIONS: CPT

## 2022-09-22 PROCEDURE — 97530 THERAPEUTIC ACTIVITIES: CPT

## 2022-09-22 NOTE — PROGRESS NOTES
PT DAILY TREATMENT NOTE     Patient Name: Ty Barcenas  Date:2022  : 1965  [x]  Patient  Verified  Payor: BLUE CROSS / Plan: [email or web address] [facility] / Product Type: PPO /    In time: 11:38  Out time: 12:22   Total Treatment Time (min):44  Visit #: 1 of 8-10    Medicare/BCBS Only   Total Timed Codes (min):44 1:1 Treatment Time: 44       Treatment Area: Right shoulder pain [M25.511]  Acute post-operative pain [G89.18]    SUBJECTIVE  Pain Level (0-10 scale): 0  Any medication changes, allergies to medications, adverse drug reactions, diagnosis change, or new procedure performed?: [x] No    [] Yes (see summary sheet for update)  Subjective functional status/changes:   [] No changes reported  Pt was 8 min late and apologetic. Pt notes, \"I am discouraged. I feel stuck. The shoulder is higher and bumpier (points to lesser tuberosity of humerus) than the L shoulder. I'm irriated and somewhat in pain all the time now. I did great with the injection last time this happened. WHat do you think I should do? OBJECTIVE    TC min Therapeutic Exercise:  [x] See flow sheet :       Rationale: increase ROM and increase strength to improve the patients ability to perform ADLs with improved shoulder/elbow strength and mobility. 19 min There Act [x] See flow sheet :    -POC discussion  to include cortisone injection     Rationale: increase ROM and increase strength to improve the patients ability to perform ADLs with improved shoulder/elbow strength and mobility. TC min Neuromuscular re-education  [x] See flow sheet :  -ER/IR for NMRed after RTC repair    -discussed Neuromuscular fatigue   Rationale: increase ROM and increase strength to improve the patients ability to perform ADLs with improved shoulder/elbow strength and mobility.     25 min Manual Therapy: DTM and TrP release to R UT, deltoid, infraspinatus, subscapularis and lat, pec major;  R GHJ distraction; R shoulder inferior glide grade 4 in 90 deg abduction , posterior glide grade 3-4, manual stretching into flexion/scaption/ER/IR with scap stabilization and GHJ distraction; Scapular mobs in L SL ; clavicular upwards rotation; contract relax for R GHJ flexion     The manual therapy interventions were performed at a separate and distinct time from the therapeutic activities interventions. Rationale: decrease pain, increase ROM, increase tissue extensibility, decrease trigger points and increase postural awareness to improve ease of ADLs in the home environment. With   [x] TE   [] TA   [] neuro   [] other: Patient Education: [x] Review HEP    [] Progressed/Changed HEP based on:   [x] positioning   [x] body mechanics   [] transfers   [] heat/ice application    [] other:     HEP change to just include: contract relax R shoulder wall slides, ER in doorway (all ranges) and lat stretch at the table         Other Objective/Functional Measures:   Pt is 22 weeks 2 days post-op    Pain Level (0-10 scale) post treatment: 0    ASSESSMENT/Changes in Function: Pt with increasing pain and stiffness levels as she has had intermittently throughout the course of treatment in the past. She will likely benefit from another cortisone injection. She did have a great response to contract relax with R upper arm/ humerus on the pillow. She con't to progress well into ER ranges and doesn't have all signs of frozen shoulder. However, she presents with con't joint stiffness nir into inferior glides of the GHJ.  Will benefit from an assessment from PA and MD.     Patient will continue to benefit from skilled PT services to modify and progress therapeutic interventions, address functional mobility deficits, address ROM deficits, address strength deficits, analyze and address soft tissue restrictions, analyze and cue movement patterns, analyze and modify body mechanics/ergonomics, assess and modify postural abnormalities, address imbalance/dizziness and instruct in home and community integration to attain remaining goals. []  See Plan of Care  []  See progress note/recertification  []  See Discharge Summary         Progress towards goals / Updated goals:  1. Pt will be independent and compliant with updated HEP to progress to DC. PN: review and update verbally each session  Current: updated today for 3 important exercises (9/22/22)  2. Pt will have an increase in AROM of the R GHJ to > or = 150 deg elevation with normal scapulohumeral rhythm to restore ADLs and driving. PN: seated 135 deg, supine AAROM flexion 162 deg (at best)   Current:good response to contract relax for flexion ROM (9/22/22)  3. Pt will have > or = 4/5 MMT of R GHJ flexion, scaption, ER and IR to begin to restore ADLs, self-care, dressing. PN:  flexion 4+; ER 4/5, extension 5/5, abduction 4+/5, SA 4/5, IR 4+/5   4. Patient will demo AROM ER to 60 deg per protocol for progression to decrease impingement risk. PN: progressing; @45: 72 deg (PROM); @ 90: 82 deg       PN due 10/20/22    PLAN  [x]  Upgrade activities as tolerated     [x]  Continue plan of care  []  Update interventions per flow sheet       []  Discharge due to:_  [x]  Other: con't with PT 1-2x/week for  4-10 sessions. Assess response to follow up with PA to discuss possible need for cortisone injection to decrease residual pain and stiffness and con't to improve function.      Isamar Hart, PT 9/22/2022  9:10 AM    Future Appointments   Date Time Provider Jami Parrish   9/22/2022 11:30 AM Francie Cochran, PT [facility]   9/27/2022 11:30 AM Francie Cochran PT [facility]   9/29/2022 11:30 AM Francie Cochran PT [facility]   10/4/2022 11:30 AM Francie Cochran PT [facility]   10/6/2022 11:30 AM Francie Cochran, PT [facility]   10/11/2022 11:30 AM Francie Cochran, PT [facility]   10/13/2022 11:30 AM Francie Cochran, PT [facility]   10/18/2022 11:30 AM Francie Cochran, PT [facility]   10/20/2022 11:30 AM Francie Cochran, PT [facility]   10/25/2022 11:30 AM Laney Manley, CHARANJIT [facility]   10/27/2022 11:30 AM CHARANJIT Khan [facility]

## 2022-09-27 ENCOUNTER — HOSPITAL ENCOUNTER (OUTPATIENT)
Dept: PHYSICAL THERAPY | Age: 57
End: 2022-09-27
Payer: COMMERCIAL

## 2022-09-29 ENCOUNTER — HOSPITAL ENCOUNTER (OUTPATIENT)
Dept: PHYSICAL THERAPY | Age: 57
Discharge: HOME | End: 2022-09-29
Payer: COMMERCIAL

## 2022-09-29 PROCEDURE — 97140 MANUAL THERAPY 1/> REGIONS: CPT

## 2022-09-29 PROCEDURE — 97035 APP MDLTY 1+ULTRASOUND EA 15: CPT

## 2022-09-29 PROCEDURE — 97112 NEUROMUSCULAR REEDUCATION: CPT

## 2022-09-29 NOTE — PROGRESS NOTES
PT DAILY TREATMENT NOTE     Patient Name: Sylvia Whitley  Date:2022  : 1965  [x]  Patient  Verified  Payor: BLUE CROSS / Plan: [id number] [facility] [place] / Product Type: PPO /    In time: 11:28  Out time: 12:15  Total Treatment Time (min):47  Visit #: 2 of 8-10    Medicare/BCBS Only   Total Timed Codes (min):39 1:1 Treatment Time: 39       Treatment Area: Right shoulder pain [M25.511]  Acute post-operative pain [G89.18]    SUBJECTIVE  Pain Level (0-10 scale): 2-3  Any medication changes, allergies to medications, adverse drug reactions, diagnosis change, or new procedure performed?: [x] No    [] Yes (see summary sheet for update)  Subjective functional status/changes:   [] No changes reported  \"The dog pulled me and I cancelled the appt cause I was in so much pain and had to let that calm down. I also have a message in with my MD/PA to ask about a cortisone injection because that helps me so much. Will try to use my arm to walk the dog more often. \"  -I can push body weight up through my arm well  -I'm concerned with the R     OBJECTIVE      Modality rationale: decrease edema, decrease inflammation and decrease pain to improve the patients ability to decrease DOMS, improve ROM    Min Type Additional Details    6  + 2 setup   [x]  ultrasound to anterior shoulder, just lateral to the biceps long head tendon   Pulsed 20%,   W/CM: 3 min at 1.5, 2 min at 1.0 Position: supine on table    [x] Skin assessment post-treatment:  [x]intact []redness- no adverse reaction       []redness - adverse reaction:         NI min Therapeutic Exercise:  [x] See flow sheet :        NI min Therapeutic Exercise:  [x] See flow sheet :       Rationale: increase ROM and increase strength to improve the patients ability to perform ADLs with improved shoulder/elbow strength and mobility.     NI  min There Act [x] See flow sheet :    -POC discussion to include cortisone injection     Rationale: increase ROM and increase strength to improve the patients ability to perform ADLs with improved shoulder/elbow strength and mobility. 15 min Neuromuscular re-education  [x] See flow sheet :  -ER/IR for NMRed after RTC repair    -discussed Neuromuscular fatigue  -contract relax for flexion/scaption   Rationale: increase ROM and increase strength to improve the patients ability to perform ADLs with improved shoulder/elbow strength and mobility. 24 min Manual Therapy: DTM and TrP release to R UT, pec major;  R GHJ distraction; R shoulder inferior glide grade 4 in 90 deg abduction , posterior glide grade 3-4, manual stretching into flexion/scaption/ER/IR with scap stabilization and GHJ distraction; contract relax for R GHJ flexion     The manual therapy interventions were performed at a separate and distinct time from the therapeutic activities interventions. Rationale: decrease pain, increase ROM, increase tissue extensibility, decrease trigger points and increase postural awareness to improve ease of ADLs in the home environment.           With   [x] TE   [] TA   [] neuro   [] other: Patient Education: [x] Review HEP    [] Progressed/Changed HEP based on:   [x] positioning   [x] body mechanics   [] transfers   [x] heat/ice - pt using heat recently application    [] other:     HEP change to just include: contract relax R shoulder wall slides, ER in doorway (all ranges) and lat stretch at the table         Other Objective/Functional Measures:   Pt is 23 weeks 2 days post-op  (-) speeds, Lyon's, Drop arm, Empty can    Shoulder AROM/PROM:                                           AROM (deg)              PROM (deg)    Right Left Right   Flexion (seated/supine) 150 (supine)   159   Extension NT   NT   Scaption NT   150   Abduction NT   150   ER at 0 Deg ABD 45 (58 at best)   @45: 75 deg; @ 90: 82 deg    IR at 45 Deg ABD     62    FIR T10   ----       Pain Level (0-10 scale) post treatment: 0    ASSESSMENT/Changes in Function: Pt is still progressing with ROM but con't with very pin-point pain just lateral to the bicipital groove. She responded well to [address] today and to MT to address the pain and ROM limitations. Pt will benefit from injection if MD in agreement    Patient will continue to benefit from skilled PT services to modify and progress therapeutic interventions, address functional mobility deficits, address ROM deficits, address strength deficits, analyze and address soft tissue restrictions, analyze and cue movement patterns, analyze and modify body mechanics/ergonomics, assess and modify postural abnormalities, address imbalance/dizziness and instruct in home and community integration to attain remaining goals. []  See Plan of Care  []  See progress note/recertification  []  See Discharge Summary         Progress towards goals / Updated goals:  1. Pt will be independent and compliant with updated HEP to progress to DC. PN: review and update verbally each session  Current: updated today for 3 important exercises (9/22/22)  2. Pt will have an increase in AROM of the R GHJ to > or = 150 deg elevation with normal scapulohumeral rhythm to restore ADLs and driving. PN: seated 135 deg, supine AAROM flexion 162 deg (at best)   Current: good response to contract relax for flexion ROM (9/29/22)  3. Pt will have > or = 4/5 MMT of R GHJ flexion, scaption, ER and IR to begin to restore ADLs, self-care, dressing. PN:  flexion 4+; ER 4/5, extension 5/5, abduction 4+/5, SA 4/5, IR 4+/5   4. Patient will demo AROM ER to 60 deg per protocol for progression to decrease impingement risk. PN: progressing; @45: 72 deg (PROM); @ 90: 82 deg    Current: @45: 75 deg (PROM); @ 90: 82 deg (9/29/22)     PN due 10/20/22    PLAN  [x]  Upgrade activities as tolerated     [x]  Continue plan of care  []  Update interventions per flow sheet       []  Discharge due to:_  [x]  Other: con't with PT 1-2x/week for  4-10 sessions.  Assess response to follow up with PA to discuss possible need for cortisone injection to decrease residual pain and stiffness and con't to improve function.  Con't US if indicated for pain control     Kyra Mensah, PT 9/29/2022  9:10 AM    Future Appointments   Date Time Provider Jami Parrish   9/29/2022 11:30 AM Nestor Tapia, PT [facility]   10/4/2022 11:30 AM Nestor Tapia, PT [facility]   10/6/2022 11:30 AM Nestor Tapia, PT [facility]   10/11/2022 11:30 AM Nestro Tapia, PT [facility]   10/13/2022 11:30 AM Nestor Tapia, PT [facility]   10/18/2022 11:30 AM Nestor Tapia, PT [facility]   10/20/2022 11:30 AM Nestor Tapia, PT [facility]   10/25/2022 11:30 AM Nestor Tapia, PT [facility]   10/27/2022 11:30 AM Nestor Tapia, PT [facility]

## 2022-10-04 ENCOUNTER — HOSPITAL ENCOUNTER (OUTPATIENT)
Dept: PHYSICAL THERAPY | Age: 57
Discharge: HOME | End: 2022-10-04
Payer: COMMERCIAL

## 2022-10-04 PROCEDURE — 97110 THERAPEUTIC EXERCISES: CPT

## 2022-10-04 PROCEDURE — 97035 APP MDLTY 1+ULTRASOUND EA 15: CPT

## 2022-10-04 PROCEDURE — 97112 NEUROMUSCULAR REEDUCATION: CPT

## 2022-10-04 PROCEDURE — 97140 MANUAL THERAPY 1/> REGIONS: CPT

## 2022-10-04 NOTE — PROGRESS NOTES
PT DAILY TREATMENT NOTE     Patient Name: Javi Unger  LXHF:  : 1965  [x]  Patient  Verified  Payor: BLUE CROSS / Plan: [facility] / Product Type: PPO /    In time: 11:30  Out time: 12:20  Total Treatment Time (min):11:34 to 12:20 (46)   Visit #: 3 of 8-10    Medicare/BCBS Only   Total Timed Codes (min): 50 1:1 Treatment Time: 46       Treatment Area: Right shoulder pain [M25.511]  Acute post-operative pain [G89.18]    SUBJECTIVE  Pain Level (0-10 scale): 0  Any medication changes, allergies to medications, adverse drug reactions, diagnosis change, or new procedure performed?: [x] No    [] Yes (see summary sheet for update)  Subjective functional status/changes:   [] No changes reported  \"I have an appt with the PA a week from next Monday. I haven't had a call back from her though. Pain wise feeling alright. OBJECTIVE      Modality rationale: decrease edema, decrease inflammation and decrease pain to improve the patients ability to decrease DOMS, improve ROM    Min Type Additional Details    8   [x]  ultrasound to anterior shoulder, just lateral to the biceps long head tendon   Pulsed 20%,   W/CM: 3 min at 1.5, 2 min at 1.0 Position: supine on table    [x] Skin assessment post-treatment:  [x]intact []redness- no adverse reaction       []redness - adverse reaction:         9 min Therapeutic Exercise:  [x] See flow sheet :       Rationale: increase ROM and increase strength to improve the patients ability to perform ADLs with improved shoulder/elbow strength and mobility. NI  min There Act [x] See flow sheet :    -POC discussion to include cortisone injection     Rationale: increase ROM and increase strength to improve the patients ability to perform ADLs with improved shoulder/elbow strength and mobility.     10 min Neuromuscular re-education  [x] See flow sheet :  -ER/IR for NMRed after RTC repair    -discussed Neuromuscular fatigue  -contract relax for flexion/scaption Rationale: increase ROM and increase strength to improve the patients ability to perform ADLs with improved shoulder/elbow strength and mobility. 23 min Manual Therapy:  R GHJ distraction; R shoulder inferior glide grade 4 in 90 deg abduction , posterior glide grade 3-4, manual stretching into flexion/scaption/ER/IR with scap stabilization and GHJ distraction; contract relax for R GHJ flexion  ; scapular mobs in L SL; PROM abduction in SL    The manual therapy interventions were performed at a separate and distinct time from the therapeutic activities interventions. Rationale: decrease pain, increase ROM, increase tissue extensibility, decrease trigger points and increase postural awareness to improve ease of ADLs in the home environment. With   [x] TE   [] TA   [] neuro   [] other: Patient Education: [x] Review HEP    [] Progressed/Changed HEP based on:   [x] positioning   [x] body mechanics   [] transfers   [x] heat/ice - pt using heat recently application    [] other:         Other Objective/Functional Measures:   Pt is 24 weeks 0 days post-op    Shoulder AROM/PROM:  (from last visit)                                          AROM (deg)              PROM (deg)    Right Left Right   Flexion (seated/supine) 150 (supine)   159   Extension NT   NT   Scaption NT   150   Abduction NT   150   ER at 0 Deg ABD 45 (58 at best)   @45: 75 deg; @ 90: 82 deg    IR at 45 Deg ABD     62    FIR T10   ----       Pain Level (0-10 scale) post treatment: 0    ASSESSMENT/Changes in Function: Pt with limited inferior GHJ mobility. Pt with good response to aggressive PROM in all planes including end range IR/ER at 90 abduction and to PNF. Responding well to [address] [facility] [facility] treatment for a decrease in pin-point pain.      Patient will continue to benefit from skilled PT services to modify and progress therapeutic interventions, address functional mobility deficits, address ROM deficits, address strength deficits, analyze and address soft tissue restrictions, analyze and cue movement patterns, analyze and modify body mechanics/ergonomics, assess and modify postural abnormalities, address imbalance/dizziness and instruct in home and community integration to attain remaining goals. []  See Plan of Care  []  See progress note/recertification  []  See Discharge Summary         Progress towards goals / Updated goals:  1. Pt will be independent and compliant with updated HEP to progress to DC. PN: review and update verbally each session  Current: updated today for 3 important exercises (9/22/22)  2. Pt will have an increase in AROM of the R GHJ to > or = 150 deg elevation with normal scapulohumeral rhythm to restore ADLs and driving. PN: seated 135 deg, supine AAROM flexion 162 deg (at best)   Current: good response to contract relax for flexion ROM (9/29/22)  3. Pt will have > or = 4/5 MMT of R GHJ flexion, scaption, ER and IR to begin to restore ADLs, self-care, dressing. PN:  flexion 4+; ER 4/5, extension 5/5, abduction 4+/5, SA 4/5, IR 4+/5   Current: resumed TE/TA/NMRed with good response; no hike with AROM elevation (10/4/22)  4. Patient will demo AROM ER to 60 deg per protocol for progression to decrease impingement risk. PN: progressing; @45: 72 deg (PROM); @ 90: 82 deg    Current: @45: 75 deg (PROM); @ 90: 82 deg (9/29/22)     PN due 10/20/22    PLAN  [x]  Upgrade activities as tolerated     [x]  Continue plan of care  []  Update interventions per flow sheet       []  Discharge due to:_  [x]  Other: con't with PT 1-2x/week for  4-10 sessions. Assess response to follow up with PA to discuss possible need for cortisone injection to decrease residual pain and stiffness and con't to improve function.  Con't US if indicated for pain control     Zach Mota PT 10/4/2022  9:10 AM    Future Appointments   Date Time Provider Jami Parrihs   10/4/2022 11:30 AM Yovanny Morgan, PT [facility]   10/6/2022 11:30 AM Yovanny Morgan PT [facility]   10/11/2022 11:30 AM Edy Bond, PT [facility]   10/13/2022 11:30 AM Edy Bond, PT [facility]   10/18/2022 11:30 AM Edy Bond, PT [facility]   10/20/2022 11:30 AM Edy Bond, PT [facility]   10/25/2022 11:30 AM Edy Bond, PT [facility]   10/27/2022 11:30 AM Edy Bond, PT [facility]

## 2022-10-06 ENCOUNTER — HOSPITAL ENCOUNTER (OUTPATIENT)
Dept: PHYSICAL THERAPY | Age: 57
End: 2022-10-06
Payer: COMMERCIAL

## 2022-10-11 ENCOUNTER — APPOINTMENT (OUTPATIENT)
Dept: PHYSICAL THERAPY | Age: 57
End: 2022-10-11
Payer: COMMERCIAL

## 2022-10-13 ENCOUNTER — HOSPITAL ENCOUNTER (OUTPATIENT)
Dept: PHYSICAL THERAPY | Age: 57
Discharge: HOME | End: 2022-10-13
Payer: COMMERCIAL

## 2022-10-13 PROCEDURE — 97110 THERAPEUTIC EXERCISES: CPT

## 2022-10-13 PROCEDURE — 97140 MANUAL THERAPY 1/> REGIONS: CPT

## 2022-10-13 PROCEDURE — 97112 NEUROMUSCULAR REEDUCATION: CPT

## 2022-10-13 PROCEDURE — 97530 THERAPEUTIC ACTIVITIES: CPT

## 2022-10-13 NOTE — PROGRESS NOTES
[facility] [address] [place]  Phone: [phone] Fax: [phone]  PROGRESS NOTE  Patient Name: Karely Garcia : 1965   Treatment/Medical Diagnosis: Right shoulder pain [M25.511]  Acute post-operative pain [G89.18]   Referral Source: Shelly Alvarez*     Date of Initial Visit: 22 Attended Visits: 31 Missed Visits: 1     SUMMARY OF TREATMENT  Pt is a 61y.o. year old female who presents with  s/p R shoulder large RTC repair and biceps tenodesis DOS 2022. Ther ex including full R UE AROM, PROM and strengthening; stability and NMRed work for scapular control; DTM, joint mobs and PROM; PNF patterns for functional movements, CKC for shoulder stability; Patient education; HEP, cryotherapy for edema and pain management. CURRENT STATUS  Patient has attended PT for 31 sessions for the treatment of right large rotator cuff repair with associated biceps tenodesis and SAD. She is 25 weeks post-operative. She is progressing well with strength and overall function. However, she con't to have restricted joint mobility and impaired scapulohumeral rhythm limiting full ER and flexion ranges. She benefits from aggressive joint mobs and PROM/HEP compliance to con't to improve her ROM. We con't to watch for and treat to avoid frozen shoulder due to her slowly progressing ROM throughout the whole course of rehab. She con't to have main c/o R anterior shoulder pain on the lateral bicipital groove that limits some function, reaching, sleeping on the R shoulder. Pt to be see on Monday to inquire about another cortisone injection.        Pt is 25 weeks 2 days post-op  subjective Gains: all ADLs; heavy tasks (open doors, close car trunk); improved shoulder strength (lifting, etc); more natural right shoulder movement; able to sleep a little bit on right side most of the time (some discomfort), pain improvements; improved function even though lacking full FIR, reach to back of head  Subjective Deficits:  pain on the anterior shoulder; cross body H adduction,  Full ROM into elevation (AROM and PROM - not noticed with ADLs as much as with focused stretching); unable to reach behind back; sleeping on the R   % improvement: 70%  Pain   Average: 0/10                     Best: 0/10                   Worst: 2/10  Patient Goal: \"to get to 100%\"     Objective Measures:   Shoulder AROM/PROM:                                           AROM (deg)              PROM (deg)    Right Left Right   Flexion (seated/supine) 145 (sit); 152 (supine)   158   Extension 65 deg    70   Scaption 148   145    Abduction 152   150   ER at 0 Deg ABD 45 (58 at best)   @45: 75 deg; @ 90: 82 deg    IR at 45 Deg ABD     75   FIR T9 to T10    ----      Strength:    Right (/5) Left (/5)   GHJ   Flexion 4+ 5             Abduction 4+ 5             Extension 5 5             ER 4 5             IR 4+ 5             Serratus ant. 4 5   Bicep/Tricep: R 4+/5, L 5/5  Wrist: WNL  : WNL     FOTO 68/100    Current Goals:  1. Pt will be independent and compliant with updated HEP to progress to DC. PN: review and update verbally each session  Current: partial compliance as pt was out of town last week (10/13/22)  2. Pt will have an increase in AROM of the R GHJ to > or = 150 deg elevation with normal scapulohumeral rhythm to restore ADLs and driving. PN: seated 135 deg, supine AAROM flexion 162 deg (at best)   Current: slow progress; seated 145, PROM 158  (10/13/22)  3. Pt will have > or = 4/5 MMT of R GHJ flexion, scaption, ER and IR to begin to restore ADLs, self-care, dressing. PN:  flexion 4+; ER 4/5, extension 5/5, abduction 4+/5, SA 4/5, IR 4+/5   Current: remains the same as last month focus has been on pain management (10/13/22)  4. Patient will demo AROM ER to 60 deg per protocol for progression to decrease impingement risk.   PN: progressing; @45: 72 deg (PROM); @ 90: 82 deg    Current: @45: 75 deg (PROM); @ 90: 82 deg (10/13/22)      New Goals to be achieved in __8-10__  treatments:  Unmet from above     RECOMMENDATIONS  Pt to continue with skilled therapy for 1-2x/week for 8-10 sessions to improve shoulder mobility/strength to improve ease with overhead lifting, reaching, and full return to PLOF. Pt to inquire about cortisone injection. Also, pt reaching the end of insurance visits and we will likely space out her treatments and progress HEP to extend course of care. Would value PA/MD input on her current status of limited end range elevation movements. Thank you very much. If you have any questions/comments please contact us directly at ([phone]   Thank you for allowing us to assist in the care of your patient. Therapist Signature: Derrell Ruiz PT Date: 10/13/2022   Reporting Period  9/20/22  to 10/13/22 Time: 11:30am    NOTE TO PHYSICIAN:  PLEASE COMPLETE THE ORDERS BELOW AND FAX TO   [facility]: [phone]. If you are unable to process this request in 24 hours please contact our office: [phone].  ___ I have read the above report and request that my patient continue as recommended.   ___ I have read the above report and request that my patient continue therapy with the following changes/special instructions:_________________________________________________________   ___ I have read the above report and request that my patient be discharged from therapy. Physician Signature:        Date:       Time:                                                        Rossi Whelan*.

## 2022-10-13 NOTE — PROGRESS NOTES
PT DAILY TREATMENT NOTE     Patient Name: Daysi Guardado  RBG  : 1965  [x]  Patient  Verified  Payor: BLUE CROSS / Plan: [address] [place] [address] [place] / Product Type: PPO /    In time: 11:31  Out time: 12:18  Total Treatment Time (min):47  Visit #: 4 of 8-10    Medicare/BCBS Only   Total Timed Codes (min): 47 1:1 Treatment Time: 47       Treatment Area: Right shoulder pain [M25.511]  Acute post-operative pain [G89.18]    SUBJECTIVE  Pain Level (0-10 scale): 0  Any medication changes, allergies to medications, adverse drug reactions, diagnosis change, or new procedure performed?: [x] No    [] Yes (see summary sheet for update)  Subjective functional status/changes:   [] No changes reported    PA appt - on Monday. Asking for a cortisone injection. Drove to [place] and the next morning lots of pain. From driving or a new hotel bed? Unsure. Still trouble sleeping on the R side     OBJECTIVE      Modality rationale: decrease edema, decrease inflammation and decrease pain to improve the patients ability to decrease DOMS, improve ROM    Min Type Additional Details    NV prn  [x]  ultrasound to anterior shoulder, just lateral to the biceps long head tendon   Pulsed 20%,   W/CM: 3 min at 1.5, 2 min at 1.0 Position: supine on table    [x] Skin assessment post-treatment:  [x]intact []redness- no adverse reaction       []redness - adverse reaction:         13 min Therapeutic Exercise:  [x] See flow sheet :    Reassessment for PN        Rationale: increase ROM and increase strength to improve the patients ability to perform ADLs with improved shoulder/elbow strength and mobility. 8  min There Act [x] See flow sheet :    -POC discussion to include cortisone injection     Rationale: increase ROM and increase strength to improve the patients ability to perform ADLs with improved shoulder/elbow strength and mobility.     8 min Neuromuscular re-education  [x] See flow sheet :  -ER/IR for NMRed after RTC repair    -discussed Neuromuscular fatigue  -contract relax for flexion/scaption   Rationale: increase ROM and increase strength to improve the patients ability to perform ADLs with improved shoulder/elbow strength and mobility. 18 min Manual Therapy:  R GHJ distraction; R shoulder inferior glide grade 4 in 90 deg abduction , posterior glide grade 3-4, manual stretching into flexion/scaption/ER/IR with scap stabilization and GHJ distraction; scapular mobs in L SL; PROM abduction in SL, clavicular mobs    The manual therapy interventions were performed at a separate and distinct time from the therapeutic activities interventions. Rationale: decrease pain, increase ROM, increase tissue extensibility, decrease trigger points and increase postural awareness to improve ease of ADLs in the home environment.           With   [x] TE   [] TA   [] neuro   [] other: Patient Education: [x] Review HEP    [] Progressed/Changed HEP based on:   [x] positioning   [x] body mechanics   [] transfers   [x] heat/ice - pt using heat recently application    [] other:         Other Objective/Functional Measures:   Pt is 25 weeks 2 days post-op  subjective Gains: all ADLs; heavy tasks (open doors, close car trunk); improved shoulder strength (lifting, etc); more natural right shoulder movement; able to sleep a little bit on right side most of the time (some discomfort), pain improvements; improved function even though lacking full FIR, reach to back of head  Subjective Deficits:  pain on the anterior shoulder; cross body H adduction,  Full ROM into elevation (AROM and PROM - not noticed with ADLs as much as with focused stretching); unable to reach behind back; sleeping on the R   % improvement: 70%  Pain   Average: 0/10                     Best: 0/10                   Worst: 2/10  Patient Goal: \"to get to 100%\"     Objective Measures:   Shoulder AROM/PROM:                                           AROM (deg)              PROM (deg)    Right Left Right   Flexion (seated/supine) 145 (sit); 152 (supine)   158   Extension 65 deg    70   Scaption 148   145    Abduction 152   150   ER at 0 Deg ABD 45 (58 at best)   @45: 75 deg; @ 90: 82 deg    IR at 45 Deg ABD     75   FIR T9 to T10    ----      Strength:    Right (/5) Left (/5)   GHJ   Flexion 4+ 5             Abduction 4+ 5             Extension 5 5             ER 4 5             IR 4+ 5             Serratus ant. 4 5   Mid trap, lower trap  4, 3+/5 4, 4/5   Bicep/Tricep: R 4+/5, L 5/5  Wrist: WNL  : WNL     FOTO 68/100    Pain Level (0-10 scale) post treatment: 0    ASSESSMENT/Changes in Function: see PN     Patient will continue to benefit from skilled PT services to modify and progress therapeutic interventions, address functional mobility deficits, address ROM deficits, address strength deficits, analyze and address soft tissue restrictions, analyze and cue movement patterns, analyze and modify body mechanics/ergonomics, assess and modify postural abnormalities, address imbalance/dizziness and instruct in home and community integration to attain remaining goals. []  See Plan of Care  [x]  See progress note/recertification  []  See Discharge Summary         Progress towards goals / Updated goals:  1. Pt will be independent and compliant with updated HEP to progress to DC. PN: review and update verbally each session  Current: partial compliance as pt was out of town last week (10/13/22)  2. Pt will have an increase in AROM of the R GHJ to > or = 150 deg elevation with normal scapulohumeral rhythm to restore ADLs and driving. PN: seated 135 deg, supine AAROM flexion 162 deg (at best)   Current: slow progress; seated 145, PROM 158  (10/13/22)  3. Pt will have > or = 4/5 MMT of R GHJ flexion, scaption, ER and IR to begin to restore ADLs, self-care, dressing.   PN:  flexion 4+; ER 4/5, extension 5/5, abduction 4+/5, SA 4/5, IR 4+/5   Current: remains the same as last month focus has been on pain management (10/13/22)  4. Patient will demo AROM ER to 60 deg per protocol for progression to decrease impingement risk. PN: progressing; @45: 72 deg (PROM); @ 90: 82 deg    Current: @45: 75 deg (PROM); @ 90: 82 deg (10/13/22)     PN due 10/20/22    PLAN  [x]  Upgrade activities as tolerated     [x]  Continue plan of care  []  Update interventions per flow sheet       []  Discharge due to:_  [x]  Other: Assess response to follow up with PA to discuss possible need for cortisone injection to decrease residual pain and stiffness and con't to improve function.  Con't US if indicated for pain control     Aamir Palencia, CHARANJIT 10/13/2022  9:10 AM    Future Appointments   Date Time Provider Jami Parrish   10/13/2022 11:30 AM Rebecca Alanis PT [facility]   10/18/2022 11:30 AM Rebecca Alanis PT [facility]   10/20/2022 11:30 AM Rebecca Alanis PT [facility]   10/25/2022 11:30 AM Rebecca Alanis PT [facility]   10/27/2022 11:30 AM Rebecca Alanis PT [facility]

## 2022-10-18 ENCOUNTER — HOSPITAL ENCOUNTER (OUTPATIENT)
Dept: PHYSICAL THERAPY | Age: 57
Discharge: HOME | End: 2022-10-18
Payer: COMMERCIAL

## 2022-10-18 PROCEDURE — 97112 NEUROMUSCULAR REEDUCATION: CPT

## 2022-10-18 PROCEDURE — 97140 MANUAL THERAPY 1/> REGIONS: CPT

## 2022-10-18 PROCEDURE — 97530 THERAPEUTIC ACTIVITIES: CPT

## 2022-10-18 NOTE — PROGRESS NOTES
PT DAILY TREATMENT NOTE     Patient Name: Allison Du  FMCN:  : 1965  [x]  Patient  Verified  Payor: BLUE CROSS / Plan: [facility] / Product Type: PPO /    In time: 11:33 Out time: 12:22  Total Treatment Time (min):49  Visit #:1 of 8-10    Medicare/BCBS Only   Total Timed Codes (min): 49 1:1 Treatment Time: 45       Treatment Area: Right shoulder pain [M25.511]  Acute post-operative pain [G89.18]    SUBJECTIVE  Pain Level (0-10 scale):0  Any medication changes, allergies to medications, adverse drug reactions, diagnosis change, or new procedure performed?: [x] No    [] Yes (see summary sheet for update)  Subjective functional status/changes:   [] No changes reported  \"Got a steroid shot (from Dr. Kayla Marcelino) right into the joint. It hurt like crazy. If it doesn't work out then [facility] need an MRI. PA says that she wants us to continue. If PT supports it - we can reach out to [employer] and ortho can also support it. If that doesn't work we can stretch it out to once a week. Dr Mark Wood said I do have that capsular issue and might need a clean up surgery for scar tissue removal. Feel like something in the front of my shoulder is stopping me. I told the PA I was having trouble sleeping on the R shoulder. She notes that it is normal for ad capsulitis. It's not due to hardware issue. Follow up with PA the Monday after thanksgiving. OBJECTIVE        NC min Therapeutic Exercise:  [x] See flow sheet :       Rationale: increase ROM and increase strength to improve the patients ability to perform ADLs with improved shoulder/elbow strength and mobility. 14 min Ther Act [x] See flow sheet :  -downwards dogs for WB'ing shoulder flexion      Rationale: increase ROM and increase strength to improve the patients ability to perform ADLs with improved shoulder/elbow strength and mobility.     20 min Neuromuscular re-education  [x] See flow sheet :  -ER/IR for NMRed after RTC repair -discussed Neuromuscular fatigue  -contract relax for flexion/scaption  -Increased tband x to green   Rationale: increase ROM and increase strength to improve the patients ability to perform ADLs with improved shoulder/elbow strength and mobility. 15 min Manual Therapy:  R GHJ distraction; R shoulder inferior glide grade 4 in 90 deg abduction , posterior glide grade 3-4 (also performed in H adduction position); manual stretching into flexion/scaption/ER/IR with scap stabilization and GHJ distraction   The manual therapy interventions were performed at a separate and distinct time from the therapeutic activities interventions. Rationale: decrease pain, increase ROM, increase tissue extensibility, decrease trigger points and increase postural awareness to improve ease of ADLs in the home environment. With   [x] TE   [] TA   [] neuro   [] other: Patient Education: [x] Review HEP    [] Progressed/Changed HEP based on:   [x] positioning   [x] body mechanics   [] transfers   [x] heat/ice - pt using heat recently application    [] other:     -weighted shoulder flexion (eg downwards facing dog; child's pose)        Other Objective/Functional Measures:   Pt is 26 weeks 0 days post-op    Standing flexion: 140 (Left 146 deg)  Abduction 157     Pain Level (0-10 scale) post treatment: 0    ASSESSMENT/Changes in Function: Pt progressed well with abduction AROM today and with form with flexion with less of a shoulder hike. Nearly equal flexion (B). Improved joint mobility with glides. Discussed POC - will drop to 1x/week for remaining sessions authorized by insurance to preserve visits and extend rehab. Will attempt to get more visit approved by insurance and use orthopaedic justification prn. If not, can consider doing post-rehab exercise with a thorough program to include self-mobs on the [place] if we can't utilize any more skilled PT services.      Patient will continue to benefit from skilled PT services to modify and progress therapeutic interventions, address functional mobility deficits, address ROM deficits, address strength deficits, analyze and address soft tissue restrictions, analyze and cue movement patterns, analyze and modify body mechanics/ergonomics, assess and modify postural abnormalities, address imbalance/dizziness and instruct in home and community integration to attain remaining goals. []  See Plan of Care  [x]  See progress note/recertification  []  See Discharge Summary         Progress towards goals / Updated goals:  1. Pt will be independent and compliant with updated HEP to progress to NC. PN:partial compliance as pt was out of town last week  2. Pt will have an increase in AROM of the R GHJ to > or = 150 deg elevation with normal scapulohumeral rhythm to restore ADLs and driving. PN: seated 145, PROM 158   Current: 140 with improved form (10/18/22)  3. Pt will have > or = 4/5 MMT of R GHJ flexion, scaption, ER and IR to begin to restore ADLs, self-care, dressing. PN:  flexion 4+; ER 4/5, extension 5/5, abduction 4+/5, SA 4/5, IR 4+/5   Current: resumed strength work (10/18/22)  4. Patient will demo AROM ER to 60 deg per protocol for progression to decrease impingement risk. PN: @45: 75 deg (PROM); @ 90: 82 deg     PN due 11/13/22    PLAN  [x]  Upgrade activities as tolerated     [x]  Continue plan of care  []  Update interventions per flow sheet       []  Discharge due to:_  [x]  Other: Decrease to 1x/week to save visits. Perform post-rehab exercise program as needed. Update HEP weekly.      Karen Tadeo PT 10/18/2022  9:10 AM    Future Appointments   Date Time Provider Jami Parrish   10/18/2022 11:30 AM Bandar Infante PT [facility]   10/20/2022 11:30 AM Bandar Infante PT [facility]   10/25/2022 11:30 AM Bandar Infante PT [facility]   10/27/2022 11:30 AM Georgetta Bares, PT [facility]

## 2022-10-20 ENCOUNTER — APPOINTMENT (OUTPATIENT)
Dept: PHYSICAL THERAPY | Age: 57
End: 2022-10-20
Payer: COMMERCIAL

## 2022-10-25 ENCOUNTER — HOSPITAL ENCOUNTER (OUTPATIENT)
Dept: PHYSICAL THERAPY | Age: 57
Discharge: HOME | End: 2022-10-25
Payer: COMMERCIAL

## 2022-10-25 PROCEDURE — 97112 NEUROMUSCULAR REEDUCATION: CPT

## 2022-10-25 PROCEDURE — 97140 MANUAL THERAPY 1/> REGIONS: CPT

## 2022-10-25 PROCEDURE — 97530 THERAPEUTIC ACTIVITIES: CPT

## 2022-10-25 NOTE — PROGRESS NOTES
PT DAILY TREATMENT NOTE     Patient Name: Ruby Muniz  JLTW:  : 1965  [x]  Patient  Verified  Payor: BLUE CROSS / Plan: [employer] [facility] / Product Type: PPO /    In time: 11:40 Out time: 12:33   Total Treatment Time (min):53  Visit #:2 of 8-10    Medicare/BCBS Only   Total Timed Codes (min): 53 1:1 Treatment Time: 39       Treatment Area: Right shoulder pain [M25.511]  Acute post-operative pain [G89.18]    SUBJECTIVE  Pain Level (0-10 scale):0  Any medication changes, allergies to medications, adverse drug reactions, diagnosis change, or new procedure performed?: [x] No    [] Yes (see summary sheet for update)  Subjective functional status/changes:   [] No changes reported  \"Not bothering me to sleep on my shoulder. The ROM feels better (pt notes abduction) and still tight into flexion. When I see a mirror I can reach further and it looks equal\"    OBJECTIVE     10 (NC) min Therapeutic Exercise:  [x] See flow sheet :       Rationale: increase ROM and increase strength to improve the patients ability to perform ADLs with improved shoulder/elbow strength and mobility. 10 min Ther Act [x] See flow sheet :  -downwards dogs for WB'ing shoulder flexion      Rationale: increase ROM and increase strength to improve the patients ability to perform ADLs with improved shoulder/elbow strength and mobility. 18 min Neuromuscular re-education  [x] See flow sheet :  -ER/IR for NMRed after RTC repair    -discussed Neuromuscular fatigue  -contract relax for flexion/scaption  -Increased tband x to green   Rationale: increase ROM and increase strength to improve the patients ability to perform ADLs with improved shoulder/elbow strength and mobility.     15 min Manual Therapy:  PA glides Grade IV to TS in prone with cavitations appreciated from T4 to T8; R GHJ distraction; R shoulder inferior glide grade 4 in 90 deg abduction , posterior glide grade 3-4 (also performed in H adduction position); manual stretching into flexion/scaption/ER/IR with scap stabilization and GHJ distraction   The manual therapy interventions were performed at a separate and distinct time from the therapeutic activities interventions. Rationale: decrease pain, increase ROM, increase tissue extensibility, decrease trigger points and increase postural awareness to improve ease of ADLs in the home environment. With   [x] TE   [] TA   [] neuro   [] other: Patient Education: [x] Review HEP    [] Progressed/Changed HEP based on:   [x] positioning   [x] body mechanics   [] transfers   [x] heat/ice - pt using heat recently application    [] other:     -weighted shoulder flexion (eg downwards facing dog; child's pose)        Other Objective/Functional Measures:   Pt is 27 weeks 0 days post-op    STACI: grossly equal   FIR: limited by approx 1.5 vs the L  Standing flexion: 140 (Left 146 deg)  Abduction: 157     Pain Level (0-10 scale) post treatment: 0    ASSESSMENT/Changes in Function: progressing well with PROM IR/ER at 0, 45 and 90 deg. Good response to strength program with very minimal hike with AROM flexion end range only. Cavitations throughout the TS today helpful for improving prognosis for return of elevation ROM. POC - will drop to 1x/week for remaining sessions authorized by insurance to preserve visits and extend rehab. Unable to get more visit approved by insurance with or without PT and orthopaedic justification. Therefore, we will consider doing post-rehab exercise with a thorough program to include self-mobs on the [place] if we can't utilize any more skilled PT services.      Patient will continue to benefit from skilled PT services to modify and progress therapeutic interventions, address functional mobility deficits, address ROM deficits, address strength deficits, analyze and address soft tissue restrictions, analyze and cue movement patterns, analyze and modify body mechanics/ergonomics, assess and modify postural abnormalities, address imbalance/dizziness and instruct in home and community integration to attain remaining goals. []  See Plan of Care  [x]  See progress note/recertification  []  See Discharge Summary         Progress towards goals / Updated goals:  1. Pt will be independent and compliant with updated HEP to progress to DC. PN:partial compliance as pt was out of town last week  Current: great compliance now that pt has recent cortisone injection (10/25/22)  2. Pt will have an increase in AROM of the R GHJ to > or = 150 deg elevation with normal scapulohumeral rhythm to restore ADLs and driving. PN: seated 145, PROM 158   Current: 140 with improved form (10/18/22)  3. Pt will have > or = 4/5 MMT of R GHJ flexion, scaption, ER and IR to begin to restore ADLs, self-care, dressing. PN:  flexion 4+; ER 4/5, extension 5/5, abduction 4+/5, SA 4/5, IR 4+/5   Current: resumed/progressed strength work  (10/25/22)  4. Patient will demo AROM ER to 60 deg per protocol for progression to decrease impingement risk. PN: @45: 75 deg (PROM); @ 90: 82 deg  Current: progressing to nearly 90 deg PROM ER ROM (10/25/22)      PN due 11/13/22    PLAN  [x]  Upgrade activities as tolerated     [x]  Continue plan of care  []  Update interventions per flow sheet       []  Discharge due to:_  [x]  Other: Decrease to 1x/week to save visits. Perform post-rehab exercise program as needed. Update HEP weekly.      Brent Harvey PT 10/25/2022  9:10 AM    Future Appointments   Date Time Provider Jami Parrish   10/25/2022 11:30 AM Himanshu Getting, PT [facility]   11/1/2022 10:45 AM Himanshu Getting, PT [facility]   11/8/2022 10:45 AM Himanshu Getting, PT [facility]   11/15/2022 10:45 AM Himanshu Getting, PT [facility]   11/22/2022 10:45 AM Himanshu Getting, PT [facility]   11/29/2022 10:45 AM Himanshu Getting, PT [facility]

## 2022-10-27 ENCOUNTER — APPOINTMENT (OUTPATIENT)
Dept: PHYSICAL THERAPY | Age: 57
End: 2022-10-27
Payer: COMMERCIAL

## 2022-11-01 ENCOUNTER — HOSPITAL ENCOUNTER (OUTPATIENT)
Dept: PHYSICAL THERAPY | Age: 57
Discharge: HOME | End: 2022-11-01
Payer: COMMERCIAL

## 2022-11-01 PROCEDURE — 97140 MANUAL THERAPY 1/> REGIONS: CPT

## 2022-11-01 PROCEDURE — 97110 THERAPEUTIC EXERCISES: CPT

## 2022-11-01 PROCEDURE — 97112 NEUROMUSCULAR REEDUCATION: CPT

## 2022-11-01 NOTE — PROGRESS NOTES
PT DAILY TREATMENT NOTE     Patient Name: Allison Du  Date:2022  : 1965  [x]  Patient  Verified  Payor: BLUE CROSS / Plan: [employer] [facility] [place] [facility] / Product Type: PPO /    In time: 10:50 Out time: 11:31   Total Treatment Time (min):41  Visit #:3  of 8-10    Medicare/BCBS Only   Total Timed Codes (min): 41 1:1 Treatment Time: 41       Treatment Area: Right shoulder pain [M25.511]  Acute post-operative pain [G89.18]    SUBJECTIVE  Pain Level (0-10 scale):0  Any medication changes, allergies to medications, adverse drug reactions, diagnosis change, or new procedure performed?: [x] No    [] Yes (see summary sheet for update)  Subjective functional status/changes:   [] No changes reported  \"Doing great! Sleeping on it with no problems. Bicep is sore - must have lifted something. I\"m doing lots of hanging from the door jambs\"   Doing the crossover stretch    OBJECTIVE     10 min Therapeutic Exercise:  [x] See flow sheet :       Rationale: increase ROM and increase strength to improve the patients ability to perform ADLs with improved shoulder/elbow strength and mobility. 0 min Ther Act [x] See flow sheet :  -downwards dogs for WB'ing shoulder flexion      Rationale: increase ROM and increase strength to improve the patients ability to perform ADLs with improved shoulder/elbow strength and mobility.     16 min Neuromuscular re-education  [x] See flow sheet :  -ER/IR for NMRed after RTC repair    -discussed Neuromuscular fatigue  -contract relax for flexion/scaption  -Increased tband x to green   Rationale: increase ROM and increase strength to improve the patients ability to perform ADLs with improved shoulder/elbow strength and mobility; improve Neuromuscular control of the RTC for OH reaching, lifting    15 min Manual Therapy:  PA glides Grade IV to TS in prone with cavitations appreciated from T4 to T8; R GHJ distraction; R shoulder inferior glide grade 4 in 90 deg abduction , posterior glide grade 3-4 (also performed in H adduction position); manual stretching into flexion/scaption/ER/IR with scap stabilization and GHJ distraction   The manual therapy interventions were performed at a separate and distinct time from the therapeutic activities interventions. Rationale: decrease pain, increase ROM, increase tissue extensibility, decrease trigger points and increase postural awareness to improve ease of ADLs in the home environment. With   [x] TE   [] TA   [] neuro   [] other: Patient Education: [x] Review HEP    [] Progressed/Changed HEP based on:   [x] positioning   [x] body mechanics   [] transfers   [x] heat/ice - pt using heat recently application    [] other:     -weighted shoulder flexion (eg downwards facing dog; child's pose)   -Lat stretch: single arm using stair banister;   -Tricep stretch: shoulder and elbow flexion, with OP at the wall      Other Objective/Functional Measures:   Pt is 28 weeks 0 days post-op    STACI: grossly equal   FIR: equal (B)   Standing flexion: 140 (Left 146 deg)  Abduction: 157   Tricep stretch position: unable to achieve    Pain Level (0-10 scale) post treatment: 0    ASSESSMENT/Changes in Function: Demo's no shoulder hike with AROM abduction/scaption in functional ranges; Con't to lack full end range elevation but demo's equal FIR (B). Challenged by tricep stretch position but can achieve with OP on the wall. POC - 1x/week for remaining sessions authorized by insurance to preserve visits and extend rehab. Unable to get more visit approved by insurance with or without PT and orthopaedic justification. Therefore, we will consider doing post-rehab exercise with a thorough program to include self-mobs on the [place] if we can't utilize any more skilled PT services.      Patient will continue to benefit from skilled PT services to modify and progress therapeutic interventions, address functional mobility deficits, address ROM deficits, address strength deficits, analyze and address soft tissue restrictions, analyze and cue movement patterns, analyze and modify body mechanics/ergonomics, assess and modify postural abnormalities, address imbalance/dizziness and instruct in home and community integration to attain remaining goals. []  See Plan of Care  [x]  See progress note/recertification  []  See Discharge Summary         Progress towards goals / Updated goals:  1. Pt will be independent and compliant with updated HEP to progress to DC. PN:partial compliance as pt was out of town last week  Current: great compliance now that pt has recent cortisone injection (10/25/22)  2. Pt will have an increase in AROM of the R GHJ to > or = 150 deg elevation with normal scapulohumeral rhythm to restore ADLs and driving. PN: seated 145, PROM 158   Current: 140 with improved form (10/18/22)  3. Pt will have > or = 4/5 MMT of R GHJ flexion, scaption, ER and IR to begin to restore ADLs, self-care, dressing. PN:  flexion 4+; ER 4/5, extension 5/5, abduction 4+/5, SA 4/5, IR 4+/5   Current: progressed strength work with good tolerance  (11/1/22)  4. Patient will demo AROM ER to 60 deg per protocol for progression to decrease impingement risk. PN: @45: 75 deg (PROM); @ 90: 82 deg  Current: progressing to nearly 90 deg PROM ER ROM at 45 and 90 deg (11/1/22)      PN due 11/13/22    PLAN  [x]  Upgrade activities as tolerated     [x]  Continue plan of care  []  Update interventions per flow sheet       []  Discharge due to:_  [x]  Other: Update HEP weekly.      Agustin Johnson PT 11/1/2022  9:10 AM    Future Appointments   Date Time Provider Jami Parrish   11/1/2022 10:45 AM Lesa Hebert PT [facility]   11/8/2022 10:45 AM Lesa Hebert PT [facility]   11/15/2022 10:45 AM CHARANJIT [facility]PTAVA [facility]   11/22/2022 10:45 AM Lesa Hebert PT [facility]   11/29/2022 10:45 AM CHARANJIT John [facility]

## 2022-11-08 ENCOUNTER — HOSPITAL ENCOUNTER (OUTPATIENT)
Dept: PHYSICAL THERAPY | Age: 57
Discharge: HOME | End: 2022-11-08
Payer: COMMERCIAL

## 2022-11-08 PROCEDURE — 97140 MANUAL THERAPY 1/> REGIONS: CPT

## 2022-11-08 PROCEDURE — 97112 NEUROMUSCULAR REEDUCATION: CPT

## 2022-11-08 PROCEDURE — 97530 THERAPEUTIC ACTIVITIES: CPT

## 2022-11-08 NOTE — PROGRESS NOTES
PT DAILY TREATMENT NOTE     Patient Name: Phuc Mckeon  Date:2022  : 1965  [x]  Patient  Verified  Payor: BLUE CROSS / Plan: [employer] [facility] / Product Type: PPO /    In time: 10:47   Out time: 11:31  Total Treatment Time (min):44  Visit #: 4  of 8-10    Medicare/BCBS Only   Total Timed Codes (min): 44 1:1 Treatment Time: 44       Treatment Area: Right shoulder pain [M25.511]  Acute post-operative pain [G89.18]    SUBJECTIVE  Pain Level (0-10 scale): 0   Any medication changes, allergies to medications, adverse drug reactions, diagnosis change, or new procedure performed?: [x] No    [] Yes (see summary sheet for update)  Subjective functional status/changes:   [] No changes reported  \"0    OBJECTIVE     10 min Therapeutic Exercise:  [x] See flow sheet :    Wall slides  -sleeper stretch     Rationale: increase ROM and increase strength to improve the patients ability to perform ADLs with improved shoulder/elbow strength and mobility. NC min Ther Act [x] See flow sheet :  -downwards dogs for WB'ing shoulder flexion      Rationale: increase ROM and increase strength to improve the patients ability to perform ADLs with improved shoulder/elbow strength and mobility.     16 min Neuromuscular re-education  [x] See flow sheet :  -ER/IR for NMRed after RTC repair    -contract relax for flexion/scaption  -standing ER at 90 abduction  -wall slide to 130 deg flexion and then AROM lift off for NMRed of the shoulder flexors in higher ROM's   -Prone I's from 90 to full flexion  -Prone Y's from 90 to full scaption   -SA wall slides resumed   Rationale: increase ROM and increase strength to improve the patients ability to perform ADLs with improved shoulder/elbow strength and mobility; improve Neuromuscular control of the RTC for OH reaching, lifting    18 min Manual Therapy:  started session with DTM and TrP release to the R UT, supraspinatus and posterior cuff with focus on the infraspinatus; grade IV anterior mobilizations;  R GHJ distraction; R shoulder inferior glide grade 4 in 90 deg abduction , posterior glide grade 3-4 (also performed in H adduction position); manual stretching into flexion/scaption/ER/IR with scap stabilization and GHJ distraction; aggressive OP at end range flexion/scaption    The manual therapy interventions were performed at a separate and distinct time from the therapeutic activities interventions. Rationale: decrease pain, increase ROM, increase tissue extensibility, decrease trigger points and increase postural awareness to improve ease of ADLs in the home environment. With   [x] TE   [] TA   [] neuro   [] other: Patient Education: [x] Review HEP    [] Progressed/Changed HEP based on:   [x] positioning   [x] body mechanics   [] transfers   [x] heat/ice - pt using heat recently application    [] other:     End range AROM flexion with wall slides  -prone I and Y from 90 to end range, 1 arm only   -SA wall slides  -trigger point release to R infraspinatus with ball on wall      Other Objective/Functional Measures:   Pt is 29 weeks 0 days post-op    Pain Level (0-10 scale) post treatment: 0    ASSESSMENT/Changes in Function: Pt with min limited end range flexion, address today with aggressive MT and with strengthening in higher ROM's of flexion and scaption. Resumed SA strengthening although pt does not like it, she understands need for SA strength for improved OH movement form and tolerance. POC - 1x/week for remaining sessions authorized by insurance to preserve visits and extend rehab. Unable to get more visit approved by insurance with or without PT and orthopaedic justification. Therefore, we will consider doing post-rehab exercise with a thorough program to include self-mobs on the [place] if we can't utilize any more skilled PT services.      Patient will continue to benefit from skilled PT services to modify and progress therapeutic interventions, address functional mobility deficits, address ROM deficits, address strength deficits, analyze and address soft tissue restrictions, analyze and cue movement patterns, analyze and modify body mechanics/ergonomics, assess and modify postural abnormalities, address imbalance/dizziness and instruct in home and community integration to attain remaining goals. []  See Plan of Care  [x]  See progress note/recertification  []  See Discharge Summary         Progress towards goals / Updated goals:  1. Pt will be independent and compliant with updated HEP to progress to DC. PN:partial compliance as pt was out of town last week  Current: progressed again today, see above (22)  2. Pt will have an increase in AROM of the R GHJ to > or = 150 deg elevation with normal scapulohumeral rhythm to restore ADLs and driving. PN: seated 145, PROM 158   Current: con't in higher nearly equal ROM with good form and very few compensations (22)  3. Pt will have > or = 4/5 MMT of R GHJ flexion, scaption, ER and IR to begin to restore ADLs, self-care, dressing. PN:  flexion 4+; ER 4/5, extension 5/5, abduction 4+/5, SA 4/5, IR 4+/5   Current: progressed strength work with good tolerance  (22)  4. Patient will demo AROM ER to 60 deg per protocol for progression to decrease impingement risk.   PN: @45: 75 deg (PROM); @ 90: 82 deg  Current:  PROM ER ROM at 45 and 90 de deg (22)      PN due 22    PLAN  [x]  Upgrade activities as tolerated     [x]  Continue plan of care  []  Update interventions per flow sheet       []  Discharge due to:_  [x]  Other: Update HEP weekly; PN due NV for strength and ROM measures     Raegan Moraes PT 2022  9:10 AM    Future Appointments   Date Time Provider Jami Parrish   2022 10:45 AM [facility]   11/15/2022 10:45 AM CHARANJIT FaustPTAVA [facility]   2022 10:45 AM CHARANJIT FaustPTAVA [facility]   2022 10:45 AM Rogerio Arias, PT [facility]

## 2022-11-15 ENCOUNTER — HOSPITAL ENCOUNTER (OUTPATIENT)
Dept: PHYSICAL THERAPY | Age: 57
End: 2022-11-15
Payer: COMMERCIAL

## 2022-11-22 ENCOUNTER — HOSPITAL ENCOUNTER (OUTPATIENT)
Dept: PHYSICAL THERAPY | Age: 57
Discharge: HOME | End: 2022-11-22
Payer: COMMERCIAL

## 2022-11-22 PROCEDURE — 97530 THERAPEUTIC ACTIVITIES: CPT

## 2022-11-22 PROCEDURE — 97140 MANUAL THERAPY 1/> REGIONS: CPT

## 2022-11-22 PROCEDURE — 97112 NEUROMUSCULAR REEDUCATION: CPT

## 2022-11-22 NOTE — PROGRESS NOTES
PT DAILY TREATMENT NOTE     Patient Name: Rowan Hernandes  Date:2022  : 1965  [x]  Patient  Verified  Payor: Lew Felix / Plan: [facility] [place] / Product Type: PPO /    In time: 10:52   Out time: 11:30  Total Treatment Time (min):38  Visit #: 5  of 8-10    Medicare/BCBS Only   Total Timed Codes (min): 38 1:1 Treatment Time: 38       Treatment Area: Right shoulder pain [M25.511]  Acute post-operative pain [G89.18]    SUBJECTIVE  Pain Level (0-10 scale): 0  Any medication changes, allergies to medications, adverse drug reactions, diagnosis change, or new procedure performed?: [x] No    [] Yes (see summary sheet for update)  Subjective functional status/changes:   [] No changes reported  Pt7 min late and requires leaving at 11:30 for family responsibilities due to 1/2 day of school. \"I did not sleep well for the past two nights since i'm on the couch\"    OBJECTIVE     NC min Therapeutic Exercise:  [x] See flow sheet :         Rationale: increase ROM and increase strength to improve the patients ability to perform ADLs with improved shoulder/elbow strength and mobility. 10 min Ther Act [x] See flow sheet :  -reassessment for PN  -reviewed FOTO with pt  -progressed HEP     Rationale: increase ROM and increase strength to improve the patients ability to perform ADLs with improved shoulder/elbow strength and mobility.     10 min Neuromuscular re-education  [x] See flow sheet :  -standing ER at 90 abduction with tband  -ER at 0 for strength and function/ NMR of the posterior cuff  -OH press in abduction and flexion for functional lifting (per fOTO results)   Rationale: increase ROM and increase strength to improve the patients ability to perform ADLs with improved shoulder/elbow strength and mobility; improve Neuromuscular control of the RTC for OH reaching, lifting    18 min Manual Therapy:  min DTM and TrP to the R lat in L SL; contract relax for abduction (SL) and flexion (supine); scapular glides all planes; PROM abduction in SL; PROM R GHJ IR/ER at 0 , 45 and 90 deg all with scapular and humeral head stabilization respectively. Grade IV joint mobs inferior and posterior   The manual therapy interventions were performed at a separate and distinct time from the therapeutic activities interventions. Rationale: decrease pain, increase ROM, increase tissue extensibility, decrease trigger points and increase postural awareness to improve ease of ADLs in the home environment.           With   [] TE   [x] TA   [] neuro   [] other: Patient Education: [x] Review HEP    [] Progressed/Changed HEP based on:   [] positioning   [x] body mechanics   [] transfers   [] heat/ice   [] other:     -review POC  -update HEP for OH press  _ER at 0, 45 and 90 deg with tband         Other Objective/Functional Measures:   Pt is >31 weeks 0 days post-op    Subjective Gains: all ADLs; performing normal tasks with the R UE (like raising and lowering blinds); heavy tasks (open doors, close car trunk); improved shoulder strength (lifting, etc); more natural right shoulder movement; able to sleep on the R side; no pain  Subjective Deficits:  minimal end range limitations in ROM elevation   % improvement: 85-90%  Pain   Average: 0/10                     Best: 0/10                   Worst: 0/10  Patient Goal: \"to get to 100%\"     Objective Measures:   Shoulder AROM/PROM:                                           AROM (deg)              PROM (deg)    Right Left Right   Flexion (seated/supine) 150 (sit)   162   Extension 65 deg    70   Scaption 154   160   Abduction 154   154   ER at 0 Deg ABD 45 (58 at best)   @45: 75 deg; @ 90: 85 deg    IR at 45 Deg ABD     75   FIR T8 (L T3)    ----      Strength:    Right (/5) Left (/5)   GHJ   Flexion 5 5             Abduction 4+ 5             Extension 5 5             ER 4 5             IR 4+ 5             Serratus ant. 4+ 5   Bicep/Tricep: R 5/5, L 5/5  MT: 4-/5 (B), LT 3+/5 (B)   Wrist: WNL  : WNL     FOTO 67/100    Pain Level (0-10 scale) post treatment: 0    ASSESSMENT/Changes in Function: see PN     POC - 1x/week for remaining sessions authorized by insurance to preserve visits and extend rehab. Unable to get more visit approved by insurance with or without PT and orthopaedic justification. Therefore, we will consider doing post-rehab exercise with a thorough program to include self-mobs on the [place] if we can't utilize any more skilled PT services. Patient will continue to benefit from skilled PT services to modify and progress therapeutic interventions, address functional mobility deficits, address ROM deficits, address strength deficits, analyze and address soft tissue restrictions, analyze and cue movement patterns, analyze and modify body mechanics/ergonomics, assess and modify postural abnormalities, address imbalance/dizziness and instruct in home and community integration to attain remaining goals. []  See Plan of Care  [x]  See progress note/recertification  []  See Discharge Summary         Progress towards goals / Updated goals:  1. Pt will be independent and compliant with updated HEP to progress to DC. PN:partial compliance as pt was out of town last week  Current: progressed again today, see above (11/22/22)  2. Pt will have an increase in AROM of the R GHJ to > or = 150 deg elevation with normal scapulohumeral rhythm to restore ADLs and driving. PN: seated 146, PROM 150   Current: seated 150, PROM 162 (11/22/22)  3. Pt will have > or = 4/5 MMT of R GHJ flexion, scaption, ER and IR to begin to restore ADLs, self-care, dressing. PN:  flexion 4+; ER 4/5, extension 5/5, abduction 4+/5, SA 4/5, IR 4+/5   Current: goal progressing: flexion 4+/5, ER 4/5, extension 5/5, abduction 4+/5, SA 4+/5, IR 4+/5 (11/22/22)  4. Patient will demo AROM ER to 60 deg per protocol for progression to decrease impingement risk.   PN: @45: 75 deg (PROM); @ 90: 82 deg  Current:  PROM ER ROM at 45 and 90 de deg (22)    PLAN  [x]  Upgrade activities as tolerated     [x]  Continue plan of care  []  Update interventions per flow sheet       []  Discharge due to:_  [x]  Other: Recommend con't 1x/week for 3 scheduled sessions and then     Raegan Moraes PT 2022  9:10 AM    Future Appointments   Date Time Provider Jami Parrish   2022 10:45 AM Rogerio Arias PT [facility]   2022 10:45 AM Rogerio Arias PT [facility]   2022 12:15 PM CHARANJIT Faust [facility]   2022 12:15 PM Rogerio Arias PT [facility]

## 2022-11-22 NOTE — PROGRESS NOTES
[facility] AT [address] [place]  Phone: [phone] Fax: [phone]  PROGRESS NOTE  Patient Name: Marc Lopez : 1965   Treatment/Medical Diagnosis: Right shoulder pain [M25.511]  Acute post-operative pain [G89.18]   Referral Source: Beti Youssef*     Date of Initial Visit: 22 Attended Visits: 36 Missed Visits: 1     SUMMARY OF TREATMENT  Pt is a 61y.o. year old female who presents with  s/p R shoulder large RTC repair and biceps tenodesis DOS 2022. Ther ex including full R UE AROM, PROM and strengthening; stability and NMRed work for scapular control; DTM, joint mobs and PROM; PNF patterns for functional movements, CKC for shoulder stability; Patient education; HEP, cryotherapy for edema and pain management. CURRENT STATUS  Patient has attended PT for 36 sessions for the treatment of right large rotator cuff repair with associated biceps tenodesis and SAD. She is >30 weeks post-operative. She had great response to most recent cortisone injetion to decrease her c/o anterior shoulder pain which were an associated limiter to her ROM. She has functional ROM and strength gains, and overall improving scapulohumeral rhythm for improved OH mechanics.       Subjective Gains: all ADLs; performing normal tasks with the R UE (like raising and lowering blinds); heavy tasks (open doors, close car trunk); improved shoulder strength (lifting, etc); more natural right shoulder movement; able to sleep on the R side; no pain  Subjective Deficits:  minimal end range limitations in ROM elevation   % improvement: 85-90%  Pain   Average: 0/10                     Best: 0/10                   Worst: 0/10  Patient Goal: \"to get to 100%\"     Objective Measures:   Shoulder AROM/PROM:                                           AROM (deg)              PROM (deg)    Right Left Right   Flexion (seated/supine) 150 (sit)   162 Extension 65 deg    70   Scaption 154   160   Abduction 154   154   ER at 0 Deg ABD 45 (58 at best)   @45: 75 deg; @ 90: 85 deg    IR at 45 Deg ABD     75   FIR T8 (L T3)    ----      Strength:    Right (/5) Left (/5)   GHJ   Flexion 5 5             Abduction 4+ 5             Extension 5 5             ER 4 5             IR 4+ 5             Serratus ant. 4+ 5   Bicep/Tricep: R 5/5, L 5/5  MT: 4-/5 (B), LT 3+/5 (B)   Wrist: WNL  : WNL     FOTO 67/100    Current Goals:  1. Pt will be independent and compliant with updated HEP to progress to DC. PN:partial compliance as pt was out of town last week  Current: progressed again today, see above (22)  2. Pt will have an increase in AROM of the R GHJ to > or = 150 deg elevation with normal scapulohumeral rhythm to restore ADLs and driving. PN: seated 146, PROM 150   Current: seated 150, PROM 162 (22)  3. Pt will have > or = 4/5 MMT of R GHJ flexion, scaption, ER and IR to begin to restore ADLs, self-care, dressing. PN:  flexion 4+; ER 4/5, extension 5/5, abduction 4+/5, SA 4/5, IR 4+/5   Current: goal progressing: flexion 4+/5, ER 4/5, extension 5/5, abduction 4+/5, SA 4+/5, IR 4+/5 (22)  4. Patient will demo AROM ER to 60 deg per protocol for progression to decrease impingement risk. PN: @45: 75 deg (PROM); @ 90: 82 deg  Current:  PROM ER ROM at 45 and 90 de deg (22)      New Goals to be achieved in __3-4_  treatments:  1. Pt will be independent and compliant with updated HEP to progress to DC. PN:progressed again today  2. Pt will have > or = 4/5 MMT of R GHJ flexion, scaption, ER and IR to begin to restore ADLs, self-care, dressing. PN:  flexion 4+/5, ER 4/5, extension 5/5, abduction 4+/5, SA 4+/5, IR 4+/5    RECOMMENDATIONS  Recommend co'nt with her 1x/week scheduled sessions for 3-4 sessions and then likely DC to HEP.    If you have any questions/comments please contact us directly at [phone]   Thank you for allowing us to assist in the care of your patient. Therapist Signature: Harriet Hdez PT Date: 11/22/2022   Reporting Period  10/13/22 to 11/22/22 Time: 11:30am    NOTE TO PHYSICIAN:  PLEASE COMPLETE THE ORDERS BELOW AND FAX TO   [facility]: [phone]. If you are unable to process this request in 24 hours please contact our office: [phone].  ___ I have read the above report and request that my patient continue as recommended.   ___ I have read the above report and request that my patient continue therapy with the following changes/special instructions:_________________________________________________________   ___ I have read the above report and request that my patient be discharged from therapy. Physician Signature:        Date:       Time:                                                        Ryne Ahuja*.

## 2022-11-29 ENCOUNTER — HOSPITAL ENCOUNTER (OUTPATIENT)
Dept: PHYSICAL THERAPY | Age: 57
Discharge: HOME | End: 2022-11-29
Payer: COMMERCIAL

## 2022-11-29 PROCEDURE — 97530 THERAPEUTIC ACTIVITIES: CPT

## 2022-11-29 PROCEDURE — 97112 NEUROMUSCULAR REEDUCATION: CPT

## 2022-11-29 PROCEDURE — 97140 MANUAL THERAPY 1/> REGIONS: CPT

## 2022-11-29 NOTE — PROGRESS NOTES
PT DAILY TREATMENT NOTE     Patient Name: Radha Luis  Date:2022  : 1965  [x]  Patient  Verified  Payor: BLUE CROSS / Plan: [employer] [facility] [employer] / Product Type: PPO /    In time:10:47  Out time: 11:29  Total Treatment Time (min):42  Visit #:1  of 3-    Medicare/BCBS Only   Total Timed Codes (min): 42 1:1 Treatment Time: 42       Treatment Area: Right shoulder pain [M25.511]  Acute post-operative pain [G89.18]    SUBJECTIVE  Pain Level (0-10 scale): 0  Any medication changes, allergies to medications, adverse drug reactions, diagnosis change, or new procedure performed?: [x] No    [] Yes (see summary sheet for update)  Subjective functional status/changes:   [] No changes reported  \"My B12 is so low that I have started to get injections for that. It explains my fatigue. \"    OBJECTIVE     NV min Therapeutic Exercise:  [x] See flow sheet :         Rationale: increase ROM and increase strength to improve the patients ability to perform ADLs with improved shoulder/elbow strength and mobility. 15 min Ther Act [x] See flow sheet :  -prone lat stretch 2x30\"     Rationale: increase ROM and increase strength to improve the patients ability to perform ADLs with improved shoulder/elbow strength and mobility. 15 min Neuromuscular re-education  [x] See flow sheet :  -Prone ER at 90 abduction   -ER at 0 for strength and function/ NMR of the posterior cuff  -OH press in abduction and flexion for functional lifting (per fOTO results)  -\"Swimmers\" in standing: shoulder flexion to 90 --> H abduction to 90 and eccentric abduction to body. Reverse movement, abduction to 90 --> H adduction to 0 and eccentric flexion to body.   -1/2 plank UE slide outs - laterally and forward; 3x30\"      Rationale: increase ROM and increase strength to improve the patients ability to perform ADLs with improved shoulder/elbow strength and mobility; improve Neuromuscular control of the RTC for OH reaching, lifting    12 min Manual Therapy:  scapular glides all planes; PROM abduction in SL; PROM R GHJ IR/ER at 0 , 45 and 90 deg all with scapular and humeral head stabilization respectively. Grade IV joint mobs inferior and posterior and anterior (done in prone); PNF Diagonals with mod PT resistance throughout the full range (B) directions    The manual therapy interventions were performed at a separate and distinct time from the therapeutic activities interventions. Rationale: decrease pain, increase ROM, increase tissue extensibility, decrease trigger points and increase postural awareness to improve ease of ADLs in the home environment. With   [] TE   [x] TA   [] neuro   [] other: Patient Education: [x] Review HEP    [] Progressed/Changed HEP based on:   [] positioning   [x] body mechanics   [] transfers   [] heat/ice   [] other:        Other Objective/Functional Measures:   Pt is >31 weeks 0 days post-op    Pain Level (0-10 scale) post treatment: 0    ASSESSMENT/Changes in Function:Grossly intact ROM with end range limitations in ER at 0, abduction and flexion. Pt with improved PROM following joint mobs and PROM within and across treatment sessions. Reviewed HEP to include H abduction for reaching in car. Patient will continue to benefit from skilled PT services to modify and progress therapeutic interventions, address functional mobility deficits, address ROM deficits, address strength deficits, analyze and address soft tissue restrictions, analyze and cue movement patterns, analyze and modify body mechanics/ergonomics, assess and modify postural abnormalities, address imbalance/dizziness and instruct in home and community integration to attain remaining goals. []  See Plan of Care  [x]  See progress note/recertification  []  See Discharge Summary         Progress towards goals / Updated goals:  1. Pt will be independent and compliant with updated HEP to progress to DC. PN:progressed again today  2.  Pt will have > or = 4/5 MMT of R GHJ flexion, scaption, ER and IR to begin to restore ADLs, self-care, dressing.   PN:  flexion 4+/5, ER 4/5, extension 5/5, abduction 4+/5, SA 4+/5, IR 4+/5  Current:  progressed program for [facility] strength (11/29/22)    PLAN  [x]  Upgrade activities as tolerated     [x]  Continue plan of care  []  Update interventions per flow sheet       []  Discharge due to:_  [x]  Other: Recommend con't 1x/week for 3 scheduled sessions and then likely DC to HEP unless encouraged to con't via MD starting with new insurance authorization 1/1/2023     Olimpia Call, PT 11/29/2022  9:10 AM    Future Appointments   Date Time Provider Jami Parrish   11/29/2022 10:45 AM Janina Aguilar, PT [facility] [id number]   12/6/2022 12:15 PM Janina Aguilar PT [facility] [address]   12/13/2022 12:15 PM Janina Aguilar, PT [facility] [address]

## 2022-12-06 ENCOUNTER — HOSPITAL ENCOUNTER (OUTPATIENT)
Dept: PHYSICAL THERAPY | Age: 57
Discharge: HOME | End: 2022-12-06
Payer: COMMERCIAL

## 2022-12-06 PROCEDURE — 97530 THERAPEUTIC ACTIVITIES: CPT

## 2022-12-06 PROCEDURE — 97140 MANUAL THERAPY 1/> REGIONS: CPT

## 2022-12-06 PROCEDURE — 97112 NEUROMUSCULAR REEDUCATION: CPT

## 2022-12-06 NOTE — PROGRESS NOTES
PT DAILY TREATMENT NOTE     Patient Name: Amee Escamilla  Date:2022  : 1965  [x]  Patient  Verified  Payor: BLUE CROSS / Plan: [employer] [facility] / Product Type: PPO /    In time: 12:22  Out time: 1:00  Total Treatment Time (min):38  Visit #: 2 of 3-4    Medicare/BCBS Only   Total Timed Codes (min): 38 1:1 Treatment Time: 38       Treatment Area: Right shoulder pain [M25.511]  Acute post-operative pain [G89.18]    SUBJECTIVE  Pain Level (0-10 scale):0   Any medication changes, allergies to medications, adverse drug reactions, diagnosis change, or new procedure performed?: [x] No    [] Yes (see summary sheet for update)  Subjective functional status/changes:   [] No changes reported    \"I'm not noticing it anymore. I do normal tasks with my arm again without thinking about it. It feels shamar stiff today. OBJECTIVE     NV min Therapeutic Exercise:  [x] See flow sheet :         Rationale: increase ROM and increase strength to improve the patients ability to perform ADLs with improved shoulder/elbow strength and mobility. 10 min Ther Act [x] See flow sheet :    -OH reaching   Rationale: increase ROM and increase strength to improve the patients ability to perform ADLs with improved shoulder/elbow strength and mobility. 18 min Neuromuscular re-education  [x] See flow sheet :  -Bent over rows: 5#, 2x10  -added chest press 5#, 2x10  -ER at 0 for strength and function/ NMR of the posterior cuff  -OH press in abduction and flexion for functional lifting (per fOTO results)  -\"Swimmers\" in standing: shoulder flexion to 90 --> H abduction to 90 and eccentric abduction to body.  Reverse movement, abduction to 90 --> H adduction to 0 and eccentric flexion to body.   -jose ER at 0: with rhythmic stabs/perturbations   Rationale: increase ROM and increase strength to improve the patients ability to perform ADLs with improved shoulder/elbow strength and mobility; improve Neuromuscular control of the RTC for OH reaching, lifting    10 min Manual Therapy: DTM to R posterior cuff, lat in L SL; PROM R abduction; PROM flexion, ER at 90/90 with contract relax, PROM IR; grade IV joint mobs inferior and posterior    The manual therapy interventions were performed at a separate and distinct time from the therapeutic activities interventions. Rationale: decrease pain, increase ROM, increase tissue extensibility, decrease trigger points and increase postural awareness to improve ease of ADLs in the home environment. With   [] TE   [x] TA   [] neuro   [] other: Patient Education: [x] Review HEP    [] Progressed/Changed HEP based on:   [] positioning   [x] body mechanics   [] transfers   [] heat/ice   [] other:        Other Objective/Functional Measures:   Pt is >31 weeks 0 days post-op  AROM flexion: 148, scaption 170 deg   MMT R GHJ: ER 4+/5, Flexion 5-/5, abduction 5-/6, IR 5/5      Pain Level (0-10 scale) post treatment: 0    ASSESSMENT/Changes in Function: great progress with gains in strength and ROM. Pt has nearly returned to her PLOF and will be appropriate for DC next week. Patient will continue to benefit from skilled PT services to modify and progress therapeutic interventions, address functional mobility deficits, address ROM deficits, address strength deficits, analyze and address soft tissue restrictions, analyze and cue movement patterns, analyze and modify body mechanics/ergonomics, assess and modify postural abnormalities, address imbalance/dizziness and instruct in home and community integration to attain remaining goals. []  See Plan of Care  [x]  See progress note/recertification  []  See Discharge Summary         Progress towards goals / Updated goals:  1. Pt will be independent and compliant with updated HEP to progress to DC. PN:progressed again today  2. Pt will have > or = 4/5 MMT of R GHJ flexion, scaption, ER and IR to begin to restore ADLs, self-care, dressing.   PN:  flexion 4+/5, ER 4/5, extension 5/5, abduction 4+/5, SA 4+/5, IR 4+/5  Current:  flexion 5-, ER 4+, extension 5/5, abduction 5-/5, IR 5/5  (12/6/22)  PLAN  [x]  Upgrade activities as tolerated     [x]  Continue plan of care  []  Update interventions per flow sheet       []  Discharge due to:_  [x]  Other: Recommend DC NV.  Will call ortho PA to discuss findings CAROLA Palencia PT 12/6/2022  9:10 AM    Future Appointments   Date Time Provider Jami Parrish   12/6/2022 12:15 PM Rebecca Alanis PT [facility]   12/13/2022 12:15 PM Rebecca Alanis PT [facility]

## 2022-12-13 ENCOUNTER — HOSPITAL ENCOUNTER (OUTPATIENT)
Dept: PHYSICAL THERAPY | Age: 57
Discharge: HOME | End: 2022-12-13
Payer: COMMERCIAL

## 2022-12-13 PROCEDURE — 97140 MANUAL THERAPY 1/> REGIONS: CPT

## 2022-12-13 PROCEDURE — 97110 THERAPEUTIC EXERCISES: CPT

## 2022-12-13 PROCEDURE — 97112 NEUROMUSCULAR REEDUCATION: CPT

## 2022-12-13 NOTE — PROGRESS NOTES
[facility] AT [address]  Phone: [phone] Fax: [phone]  DISCHARGE NOTE  Patient Name: Alonzo Chavez : 1965   Treatment/Medical Diagnosis: Right shoulder pain [M25.511]  Acute post-operative pain [G89.18]   Referral Source: Kendall Mcelroy*     Date of Initial Visit: 22 Attended Visits: 39 Missed Visits: 1     SUMMARY OF TREATMENT  Pt is a 61y.o. year old female who presents with  s/p R shoulder large RTC repair and biceps tenodesis DOS 2022. Ther ex including full R UE AROM, PROM and strengthening; stability and NMRed work for scapular control; DTM, joint mobs and PROM; PNF patterns for functional movements, CKC for shoulder stability; Patient education; HEP, cryotherapy for edema and pain management. CURRENT STATUS  Patient has attended PT for 39 sessions for the treatment of right large rotator cuff repair with associated biceps tenodesis and SAD. She is >32 weeks post-operative. She has 0/10 pain at all times, and has returned to all her PLOF activities, self-care and work related functions. She does have min limited scapular and humeral dissociation at fully end range elevation of the R GHJ, and this was addressed thoroughly through program, with limitations due to her pain tolerance in the beginning and to the operative status as major RTC tear. Pt has functional strength and nearly full ROM in all planes, lacking full ER at 0 and full end range elevation.       Subjective Gains: all ADLs; performing normal tasks with the R UE (like raising and lowering blinds); heavy tasks (open doors, close car trunk, yard work, scrubbing the hous); improved shoulder strength (lifting, etc); more natural right shoulder movement; able to sleep on the R side; no pain  Subjective Deficits:  minimal end range limitations in ROM elevation and ER at side; residual weakness  % improvement: 90%  Pain Average: 0/10                     Best: 0/10                   Worst: 0/10  Patient Goal: \"to get to 100%; I know that doing normal tasks and exercise will regain my strength\"     Objective Measures:   Shoulder AROM/PROM:                                           AROM (deg)              PROM (deg)    Right Left Right   Flexion (seated/supine) 163 (sit)   172   Extension 65 deg    70   Scaption 163   160   Abduction 160   157   ER at 0 Deg ABD 63 (70 at best)   @45: 75 deg; @ 90: 88 deg    IR at 45 Deg ABD     80; @ 90: 45 deg    FIR T8 (B)   ----      Strength:    Right (/5) Left (/5)   GHJ   Flexion 5- 5             Abduction 5- 5             Extension 5 5             ER 4 5             IR 5 5             Serratus ant. 4+ 5   Bicep/Tricep: R 5/5, L 5/5  MT: 4-/5 (B), LT 4/5 (B)   Wrist: WNL  : WNL    FOTO 71/100     Current Goals:  1. Pt will be independent and compliant with updated HEP to progress to DC. PN:progressed again today  Current: final DC planning done today (12/13/22)  2. Pt will have > or = 4/5 MMT of R GHJ flexion, scaption, ER and IR to begin to restore ADLs, self-care, dressing. PN:  flexion 4+/5, ER 4/5, extension 5/5, abduction 4+/5, SA 4+/5, IR 4+/5  Current:  goal met; flexion 5-, ER 4+, extension 5/5, abduction 5-/5, IR 5/5  (12/13/22)    RECOMMENDATIONS  PT is going to call PA to discuss pt's improvements and current function. Recommend that she could be appropriate for DC, or we could attempt to aggressively work on her ROM/scapulohumeral rhythm once her insurance renewed in 1/1/2023. Would prefer MD/PA judgment in addition to our findings for coordinated approach to care. Thank you! If you have any questions/comments please contact us directly at [phone]   Thank you for allowing us to assist in the care of your patient.   Therapist Signature: Hubert Pacheco PT Date: 12/13/2022   Reporting Period  11/22/22 to 12/13/22 Time: 11:30am    NOTE TO PHYSICIAN:  PLEASE COMPLETE THE ORDERS BELOW AND FAX TO   [facility] at [facility]: [phone]. If you are unable to process this request in 24 hours please contact our office: [phone].  ___ I have read the above report and request that my patient continue as recommended.   ___ I have read the above report and request that my patient continue therapy with the following changes/special instructions:_________________________________________________________   ___ I have read the above report and request that my patient be discharged from therapy. Physician Signature:        Date:       Time:                                                        Edd Melo*.

## 2022-12-13 NOTE — PROGRESS NOTES
Physical Therapy Discharge Instructions      [facility] - [address] [facility]  [phone] fax      Patient: Rowan Hernandes  : 1965      Continue Home Exercise Program 1 times per day (stretching) for 4 weeks (2x/week for strength), then decrease to \"as needed\" times per week      Continue with    [] Ice  as needed 0 times per day     [] Heat           Follow up with MD:     [] Upon completion of therapy     [x] As needed; or call PT with questions ([phone]; Darren Canchola)      Recommendations:     [x]   Return to activity with home program    []   Return to activity with the following modifications:       []Post Rehab Program    []Join Independent aquatic program     []Return to/join local gym        Additional Comments: pleaseure to work with you!           Baldo Kawasaki, PT 2022 12:51 PM

## 2022-12-13 NOTE — PROGRESS NOTES
PT DAILY TREATMENT NOTE     Patient Name: Phuc Mckeon  HMJU:  : 1965  [x]  Patient  Verified  Payor: BLUE CROSS / Plan: [email or web address] [facility] / Product Type: PPO /    In time:12:16   Out time: 12:57  Total Treatment Time (min):41  Visit #:3 of 3-4    Medicare/BCBS Only   Total Timed Codes (min):41 1:1 Treatment Time: 41       Treatment Area: Right shoulder pain [M25.511]  Acute post-operative pain [G89.18]    SUBJECTIVE  Pain Level (0-10 scale):0  Any medication changes, allergies to medications, adverse drug reactions, diagnosis change, or new procedure performed?: [x] No    [] Yes (see summary sheet for update)  Subjective functional status/changes:   [] No changes reported    \"Cleaning, yardwork, getting [place] stuff out - made my arm sore\"     OBJECTIVE     19 min Therapeutic Exercise:  [x] See flow sheet :  -assessment for DC  POC planning  -DC planning  -HEP review        Rationale: increase ROM and increase strength to improve the patients ability to perform ADLs with improved shoulder/elbow strength and mobility. 12 min Neuromuscular re-education  [x] See flow sheet :  -review HEP for con't re-education and strength gains   Rationale: increase ROM and increase strength to improve the patients ability to perform ADLs with improved shoulder/elbow strength and mobility; improve Neuromuscular control of the RTC for OH reaching, lifting    10 min Manual Therapy: DTM to R posterior cuff, lat in L SL; PROM R abduction; PROM flexion, ER at 90/90 with contract relax, PROM IR; grade IV joint mobs inferior and posterior    The manual therapy interventions were performed at a separate and distinct time from the therapeutic activities interventions. Rationale: decrease pain, increase ROM, increase tissue extensibility, decrease trigger points and increase postural awareness to improve ease of ADLs in the home environment.           With   [] TE   [x] TA   [] neuro   [] other: Patient Education: [x] Review HEP    [] Progressed/Changed HEP based on:   [] positioning   [x] body mechanics   [] transfers   [] heat/ice   [] other:   -final HEP printed and reviewed   -PT to call PA and discuss her findings; possible resume PT in 2023 if needed to attempt to regain final ROM and strength        Other Objective/Functional Measures:   Pt is >31 weeks 0 days post-op    Subjective Gains: all ADLs; performing normal tasks with the R UE (like raising and lowering blinds); heavy tasks (open doors, close car trunk, yard work, scrubbing the hous); improved shoulder strength (lifting, etc); more natural right shoulder movement; able to sleep on the R side; no pain  Subjective Deficits:  minimal end range limitations in ROM elevation and ER at side; residual weakness  % improvement: 90%  Pain   Average: 0/10                     Best: 0/10                   Worst: 0/10  Patient Goal: \"to get to 100%;  I know that doing normal tasks and exercise will regain my strength\"     Objective Measures:   Shoulder AROM/PROM:                                           AROM (deg)              PROM (deg)    Right Left Right   Flexion (seated/supine) 163 (sit)   172   Extension 65 deg    70   Scaption 163   160   Abduction 160   157   ER at 0 Deg ABD 63 (70 at best)   @45: 75 deg; @ 90: 88 deg    IR at 45 Deg ABD     80; @ 90: 45 deg    FIR T8 (B)   ----      Strength:    Right (/5) Left (/5)   GHJ   Flexion 5- 5             Abduction 5- 5             Extension 5 5             ER 4 5             IR 5 5             Serratus ant. 4+ 5   Bicep/Tricep: R 5/5, L 5/5  MT: 4-/5 (B), LT 4/5 (B)   Wrist: WNL  : WNL     FOTO 67/100    Pain Level (0-10 scale) post treatment: 0    ASSESSMENT/Changes in Function:  see DC note      Patient will continue to benefit from skilled PT services to modify and progress therapeutic interventions, address functional mobility deficits, address ROM deficits, address strength deficits, analyze and address soft tissue restrictions, analyze and cue movement patterns, analyze and modify body mechanics/ergonomics, assess and modify postural abnormalities, address imbalance/dizziness and instruct in home and community integration to attain remaining goals. []  See Plan of Care  []  See progress note/recertification  [x]  See Discharge Summary         Progress towards goals / Updated goals:  1. Pt will be independent and compliant with updated HEP to progress to DC. PN:progressed again today  Current: final DC planning done today (12/13/22)  2. Pt will have > or = 4/5 MMT of R GHJ flexion, scaption, ER and IR to begin to restore ADLs, self-care, dressing. PN:  flexion 4+/5, ER 4/5, extension 5/5, abduction 4+/5, SA 4+/5, IR 4+/5  Current:  goal met; flexion 5-, ER 4+, extension 5/5, abduction 5-/5, IR 5/5  (12/13/22)    PLAN  []  Upgrade activities as tolerated     []  Continue plan of care  []  Update interventions per flow sheet       [x]  Discharge due to:insurance expiration;  met goals, I with HEP and progression   [x]  Other: will discuss POC, results with PA and determine if pt needs more services once insurance re-ups in 2023.      Derrell Ruiz PT 12/13/2022  9:10 AM    Future Appointments   Date Time Provider Jami Parrish   12/13/2022 12:15 PM Poonam Wilson, PT [facility]

## (undated) DEVICE — RELOAD STPL L45MM H15 35MM REG TISS BLU 6 ROW B FRM NAT

## (undated) DEVICE — NEEDLE HYPO 25GA L1.5IN BVL ORIENTED ECLIPSE

## (undated) DEVICE — DERMABOND SKIN ADH 0.7ML -- DERMABOND ADVANCED 12/BX

## (undated) DEVICE — BLADELESS OPTICAL TROCAR WITH FIXATION CANNULA: Brand: VERSAONE

## (undated) DEVICE — TROCAR ENDOSCP L100MM DIA12MM STBL SL BLDELSS ENDOPATH XCEL

## (undated) DEVICE — CURVED TIP ARTICULATING RELOAD WITH TRI-STAPLE TECHNOLOGY: Brand: ENDO GIA

## (undated) DEVICE — VISUALIZATION SYSTEM: Brand: CLEARIFY

## (undated) DEVICE — SPECIMEN RETRIEVAL POUCH: Brand: ENDO CATCH GOLD

## (undated) DEVICE — SUTURE VCRL SZ 2-0 L54IN ABSRB VLT W/O NDL POLYGLACTIN 910 J618H

## (undated) DEVICE — STERILE COTTON TIPPED APPLICATORS: Brand: PURITAN

## (undated) DEVICE — INTENDED FOR TISSUE SEPARATION, AND OTHER PROCEDURES THAT REQUIRE A SHARP SURGICAL BLADE TO PUNCTURE OR CUT.: Brand: BARD-PARKER ® CARBON RIB-BACK BLADES

## (undated) DEVICE — STAPLER ENDOSCP 45MM L34CM STD NAT ARTC LIN CUT ECHELON FLX

## (undated) DEVICE — STERILE POLYISOPRENE POWDER-FREE SURGICAL GLOVES WITH EMOLLIENT COATING: Brand: PROTEXIS

## (undated) DEVICE — STERILE POLYISOPRENE POWDER-FREE SURGICAL GLOVES: Brand: PROTEXIS

## (undated) DEVICE — KIT CATH OD16FR 5ML BLLN SIL URIN INDWL STR TIP INF CTRL

## (undated) DEVICE — INSUFFLATION NEEDLE: Brand: SURGINEEDLE

## (undated) DEVICE — REM POLYHESIVE ADULT PATIENT RETURN ELECTRODE: Brand: VALLEYLAB

## (undated) DEVICE — SHEARS RMFG AUTOSUTURE ENDO --

## (undated) DEVICE — SOLUTION IRRIG 1000ML LAC RINGER PLAS POUR BTL

## (undated) DEVICE — SYR 10ML CTRL LR LCK NSAF LF --

## (undated) DEVICE — UNIVERSAL FIXATION CANNULA: Brand: VERSAONE

## (undated) DEVICE — 2, DISPOSABLE SUCTION/IRRIGATOR WITH DISPOSABLE TIP: Brand: STRYKEFLOW

## (undated) DEVICE — STERILE LATEX POWDER-FREE SURGICAL GLOVESWITH NITRILE COATING: Brand: PROTEXIS

## (undated) DEVICE — LIGHT HANDLE: Brand: DEVON

## (undated) DEVICE — RELOAD STPL L45MM H1-2.6MM MESENTERY THN TISS WHT GRIPPING

## (undated) DEVICE — SUTURE MCRYL SZ 4-0 L27IN ABSRB UD L24MM PS-1 3/8 CIR PRIM Y935H

## (undated) DEVICE — TRAY PREP DRY W/ PREM GLV 2 APPL 6 SPNG 2 UNDPD 1 OVERWRAP

## (undated) DEVICE — TRAY CATH 16F URIN MTR LTX -- CONVERT TO ITEM 363111

## (undated) DEVICE — (D)SYR 10ML 1/5ML GRAD NSAF -- PKGING CHANGE USE ITEM 338027

## (undated) DEVICE — KENDALL SCD EXPRESS SLEEVES, KNEE LENGTH, MEDIUM: Brand: KENDALL SCD

## (undated) DEVICE — DECANTER VI C-FLO LF --

## (undated) DEVICE — BLADELESS OPTICAL TROCAR WITH FIXATION CANNULA: Brand: VERSAPORT

## (undated) DEVICE — UNIVERSAL STAPLER: Brand: ENDO GIA ULTRA

## (undated) DEVICE — PACK PROCEDURE SURG LAPAROSCOPY 17X7 MM BRTRC PRIMUS

## (undated) DEVICE — THE ECHELON FLEX POWERED PLUS ARTICULATING ENDOSCOPIC LINEAR CUTTERS ARE STERILE, SINGLE PATIENT USE INSTRUMENTS THAT SIMULTANEOUSLYCUT AND STAPLE TISSUE. THERE ARE SIX STAGGERED ROWS OF STAPLES, THREE ON EITHER SIDE OF THE CUT LINE. THE ECHELON FLEX 45 POWERED PLUSINSTRUMENTS HAVE A STAPLE LINE THAT IS APPROXIMATELY 45 MM LONG AND A CUT LINE THAT IS APPROXIMATELY 42 MM LONG. THE SHAFT CAN ROTATE FREELYIN BOTH DIRECTIONS AND AN ARTICULATION MECHANISM ENABLES THE DISTAL PORTION OF THE SHAFT TO PIVOT TO FACILITATE LATERAL ACCESS TO THE OPERATIVESITE.THE INSTRUMENTS ARE PACKAGED WITH A PRIMARY LITHIUM BATTERY PACK THAT MUST BE INSTALLED PRIOR TO USE. THERE ARE SPECIFIC REQUIREMENTS FORDISPOSING OF THE BATTERY PACK. REFER TO THE BATTERY PACK DISPOSAL SECTION.THE INSTRUMENTS ARE PACKAGED WITHOUT A RELOAD AND MUST BE LOADED PRIOR TO USE. A STAPLE RETAINING CAP ON THE RELOAD PROTECTS THE STAPLE LEGPOINTS DURING SHIPPING AND TRANSPORTATION. THE INSTRUMENTS’ LOCK-OUT FEATURE IS DESIGNED TO PREVENT A USED OR IMPROPERLY INSTALLED RELOADFROM BEING REFIRED OR AN INSTRUMENT FROM BEING FIRED WITHOUT A RELOAD.: Brand: ECHELON FLEX

## (undated) DEVICE — STERILE LATEX POWDER FREE SURGICAL GLOVES WITH HYDROGEL COATING: Brand: PROTEXIS

## (undated) DEVICE — SOLUTION SCRB 4OZ 4% CHG CLN BASE FOR PT SKIN ANTISEPSIS